# Patient Record
Sex: MALE | Race: WHITE | NOT HISPANIC OR LATINO | Employment: FULL TIME | URBAN - METROPOLITAN AREA
[De-identification: names, ages, dates, MRNs, and addresses within clinical notes are randomized per-mention and may not be internally consistent; named-entity substitution may affect disease eponyms.]

---

## 2019-05-28 ENCOUNTER — OFFICE VISIT (OUTPATIENT)
Dept: FAMILY MEDICINE | Facility: CLINIC | Age: 34
End: 2019-05-28
Payer: COMMERCIAL

## 2019-05-28 DIAGNOSIS — R73.9 HYPERGLYCEMIA: ICD-10-CM

## 2019-05-28 DIAGNOSIS — L97.919 VENOUS ULCERS OF BOTH LOWER EXTREMITIES: Primary | ICD-10-CM

## 2019-05-28 DIAGNOSIS — I83.029 VENOUS ULCERS OF BOTH LOWER EXTREMITIES: Primary | ICD-10-CM

## 2019-05-28 DIAGNOSIS — L97.929 VENOUS ULCERS OF BOTH LOWER EXTREMITIES: Primary | ICD-10-CM

## 2019-05-28 DIAGNOSIS — E66.01 MORBID OBESITY WITH BMI OF 70 AND OVER, ADULT: ICD-10-CM

## 2019-05-28 DIAGNOSIS — F17.210 TOBACCO DEPENDENCE DUE TO CIGARETTES: ICD-10-CM

## 2019-05-28 DIAGNOSIS — I83.019 VENOUS ULCERS OF BOTH LOWER EXTREMITIES: Primary | ICD-10-CM

## 2019-05-28 PROBLEM — Z00.00 ROUTINE MEDICAL EXAM: Status: ACTIVE | Noted: 2019-05-28

## 2019-05-28 PROCEDURE — 99999 PR PBB SHADOW E&M-NEW PATIENT-LVL V: ICD-10-PCS | Mod: PBBFAC,,, | Performed by: FAMILY MEDICINE

## 2019-05-28 PROCEDURE — 3008F BODY MASS INDEX DOCD: CPT | Mod: CPTII,S$GLB,, | Performed by: FAMILY MEDICINE

## 2019-05-28 PROCEDURE — 99203 PR OFFICE/OUTPT VISIT, NEW, LEVL III, 30-44 MIN: ICD-10-PCS | Mod: S$GLB,,, | Performed by: FAMILY MEDICINE

## 2019-05-28 PROCEDURE — 99203 OFFICE O/P NEW LOW 30 MIN: CPT | Mod: S$GLB,,, | Performed by: FAMILY MEDICINE

## 2019-05-28 PROCEDURE — 3008F PR BODY MASS INDEX (BMI) DOCUMENTED: ICD-10-PCS | Mod: CPTII,S$GLB,, | Performed by: FAMILY MEDICINE

## 2019-05-28 PROCEDURE — 99999 PR PBB SHADOW E&M-NEW PATIENT-LVL V: CPT | Mod: PBBFAC,,, | Performed by: FAMILY MEDICINE

## 2019-05-28 RX ORDER — ASPIRIN 81 MG/1
81 TABLET ORAL DAILY
COMMUNITY

## 2019-05-28 RX ORDER — DOXYCYCLINE 100 MG/1
CAPSULE ORAL
Refills: 0 | COMMUNITY
Start: 2019-05-26 | End: 2019-07-29 | Stop reason: ALTCHOICE

## 2019-05-28 NOTE — PROGRESS NOTES
"FAMILY MEDICINE    Patient Active Problem List   Diagnosis    Tobacco dependence due to cigarettes    Hyperglycemia    Morbid obesity with BMI of 70 and over, adult    Venous ulcers of both lower extremities       CC:   Chief Complaint   Patient presents with    Est PCP    Wound Care     both legs       HPI: Matthieu Son is a 34 y.o. male  - with morbid obesity presents to establish care following recent ER visit to [facility] for bilateral open leg wounds. Last PCP: none. He presents with his wife.     1. Bilateral leg wounds  Onset: about 1 week ago  - reports that started as red bumps on his legs. Had a similar issues several months ago and was evaluated at [facility] NP Radha Montano and treated with antibiotics with resolution and no opening of the wounds however this time s/p red bumps started ("they looked like spider bites"), they eventually opened about 1 week later and have not healed  - he went to [facility] ER 5/26/19 with WBC 10,300 and was treated with oral doxycycline and referred to the Wound care clinic  Location: bilateral lower legs  Duration: about 1-2 weeks open  Character: open non-painful wounds with drainage  - reports sister-in-law is a wound care tech and has been assisting with care of the wounds, cleaning with wound cleanser and applying Aquacel and foam dressing every other day  Aggravating factors: +varicose veins, obesity, stands for long hours at work  Relieving factors: none  Associated symptoms: drainage  Negative symptoms: denies pain, swelling, weakness        HEALTH MAINTENANCE:   Health Maintenance   Topic Date Due    Lipid Panel  1986    Pneumococcal Vaccine (Medium Risk) (1 of 1 - PPSV23) 02/09/2005    Influenza Vaccine  08/01/2019    TETANUS VACCINE  08/01/2026       ROS: Review of Systems   Constitutional: Negative for activity change, appetite change, fatigue, fever and unexpected weight change.   HENT: Negative.  Negative for tinnitus, trouble swallowing and voice change.  "   Eyes: Negative.  Negative for visual disturbance.   Respiratory: Negative for cough, chest tightness and shortness of breath.    Cardiovascular: Negative for chest pain, palpitations and leg swelling.   Gastrointestinal: Negative for abdominal distention, abdominal pain, anal bleeding, blood in stool, constipation, diarrhea, nausea and vomiting.   Endocrine: Negative for polydipsia, polyphagia and polyuria.   Genitourinary: Negative for difficulty urinating, frequency, hematuria and urgency.   Musculoskeletal: Negative for arthralgias, gait problem, joint swelling, neck pain and neck stiffness.   Skin: Positive for wound. Negative for rash.   Allergic/Immunologic: Negative.    Neurological: Negative for dizziness, syncope, weakness, light-headedness and headaches.   Hematological: Negative.    Psychiatric/Behavioral: Negative for dysphoric mood and sleep disturbance. The patient is not nervous/anxious.        ALLERGIES:   Review of patient's allergies indicates:  No Known Allergies    MEDS:     Current Outpatient Medications:     aspirin (ECOTRIN) 81 MG EC tablet, Take 81 mg by mouth once daily., Disp: , Rfl:     doxycycline (VIBRAMYCIN) 100 MG Cap, TK 1 C PO Q 12 H FOR 5 DAYS, Disp: , Rfl: 0    multivit-minerals/folic acid (ONE-A-DAY MEN VITACRAVES ORAL), Take 1 tablet by mouth once daily., Disp: , Rfl:     History reviewed. No pertinent past medical history.    Past Surgical History:   Procedure Laterality Date    WISDOM TOOTH EXTRACTION         Family History   Problem Relation Age of Onset    Diabetes Mother     Hypertension Mother     Breast cancer Mother     Hyperlipidemia Mother     Cancer Mother         breast cancer    Diabetes Father     Hypertension Father     Hyperlipidemia Father     Peripheral vascular disease Father     Heart attack Father     No Known Problems Sister     No Known Problems Son     Colon cancer Maternal Grandmother     Heart disease Maternal Grandfather     No  "Known Problems Paternal Grandmother     No Known Problems Paternal Grandfather        Social History     Social History Narrative     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 Smoker since 17 yo about 1 ppd. Denies illicit drgus       OBJECTIVE:   Vitals:    05/28/19 1414 05/28/19 1430   BP: (!) 144/102 136/84   BP Location: Left arm    Patient Position: Sitting    BP Method: Large (Manual)    Pulse: 93    Resp: 18    SpO2: 97%    Weight: (!) 229.5 kg (506 lb)    Height: 5' 11" (1.803 m)      Body mass index is 70.57 kg/m².    Physical Exam   Constitutional: He is oriented to person, place, and time. No distress.   Morbidly obese   HENT:   Head: Normocephalic and atraumatic.   Right Ear: Tympanic membrane and ear canal normal.   Left Ear: Tympanic membrane and ear canal normal.   Nose: Nose normal.   Mouth/Throat: Uvula is midline, oropharynx is clear and moist and mucous membranes are normal.   Eyes: Pupils are equal, round, and reactive to light. Conjunctivae and EOM are normal.   Neck: Trachea normal. Neck supple. Normal carotid pulses and no JVD present. Carotid bruit is not present. No thyromegaly present.   Cardiovascular: Normal rate, regular rhythm, normal heart sounds and intact distal pulses. Exam reveals no gallop and no friction rub.   No murmur heard.  Pulmonary/Chest: Effort normal and breath sounds normal. He has no decreased breath sounds. He has no wheezes. He has no rhonchi. He has no rales.   Abdominal: Soft. Normal appearance and bowel sounds are normal. There is no tenderness.   Musculoskeletal: He exhibits no edema.        Legs:  Amaury's sign: negative   Neurological: He is alert and oriented to person, place, and time.   Skin: Skin is warm. Capillary refill takes less than 2 seconds. No rash noted. No cyanosis. Nails show no clubbing.   Varicose changes bilateral lower extremities     Left leg    Right calf        Depression Patient Health " Questionnaire 5/28/2019   In the last two weeks how often have you had little interest or pleasure in doing things 0   In the last two weeks how often have you felt down, depressed or hopeless 0   PHQ-2 Total Score 0       PERTINENT RESULTS:   5/26/19   Sodium Level [135-145 mEq/L] 138 mEq/L   (5/26/19 12:08 PM) Potassium Level [3.5-5.0 mEq/L] 4.2 mEq/L   (5/26/19 12:08 PM) Chloride Level [ mEq/L] 102 mEq/L   (5/26/19 12:08 PM) Bicarbonate, CO2 [21-31 mEq/L] 26 mEq/L   (5/26/19 12:08 PM) Glucose, Plasma [ mg/dL] 218 mg/dL   *HI*  (5/26/19 12:08 PM) Calcium Level [8.5-10.5 mg/dL] 9.0 mg/dL   (5/26/19 12:08 PM) Blood Urea Nitrogen [7-21 mg/dL] 9 mg/dL   (5/26/19 12:08 PM) Creatinine [0.7-1.2 mg/dL] 0.6 mg/dL   *LOW*  (5/26/19 12:08 PM) Bun:Creatinine Ratio [6-22 Ratio] 15 Ratio   (5/26/19 12:08 PM) Calculated Glomerular Filtration Rate [>=60.0 mL/min/1.73m2] 155.2 mL/min/1.73m2   (5/26/19 12:08 PM) Anion Gap [9-18 mEq/L] 14 mEq/L   (5/26/19 12:08 PM) Calc Osmolality [275-295 mOsm/kg] 281 mOsm/kg   (5/26/19 12:08 PM)   HEMATOLOGY    Most recent to oldest [Reference Range]: 1   WBC [4.5-11.0 K/UL] 10.3 K/UL 1  (5/26/19 12:08 PM)    RBC [4.45-5.90 MIL/uL] 4.99 MIL/uL 2  (5/26/19 12:08 PM)    Hgb [13.6-17.5 gram/dL] 14.3 gram/dL 3  (5/26/19 12:08 PM)    Hct [40.0-52.0 %] 42.3 % 4  (5/26/19 12:08 PM)    MCV [80.0-94.0 Femtoliter] 84.8 Femtoliter 5  (5/26/19 12:08 PM)    MCH [27.0-33.0 Picogram] 28.7 Picogram 6  (5/26/19 12:08 PM)    MCHC [32.0-36.0 gram/dL] 33.8 gram/dL 7  (5/26/19 12:08 PM)    RDW [12.0-15.3 gram/dL] 13.9 gram/dL 8  (5/26/19 12:08 PM)    Platelet Count, Automated [150-350 K/UL] 204 K/UL 9  (5/26/19 12:08 PM)    Mean Platelet Volume [7.0-10.2 Femtoliter] 8.8 Femtoliter 10  (5/26/19 12:08 PM)    Diff Type Auto   *NA*  (5/26/19 12:08 PM)    Neutrophil % [32.0-80.0 %] 69.8 %   (5/26/19 12:08 PM)    Lymphocyte % [15.0-45.0 %] 19.4 %   (5/26/19 12:08 PM)    Monocyte % [3.0-13.0 %] 7.9 %   (5/26/19  12:08 PM)    Eosinophil % [0.0-4.0 %] 1.9 %   (5/26/19 12:08 PM)    Basophil % [0.0-2.0 %] 1.0 %   (5/26/19 12:08 PM)    Abs Neutrophil # [2.1-7.6 K/UL] 7.2 K/UL   (5/26/19 12:08 PM)    Abs Lymphocyte # [1.0-4.2 K/UL] 2.0 K/UL   (5/26/19 12:08 PM)    Abs Monocyte # [0.1-0.8 K/UL] 0.8 K/UL   (5/26/19 12:08 PM)    Abs Eosinophil # [0.0-0.7 K/UL] 0.2 K/UL   (5/26/19 12:08 PM)    Abs Basophil # [0.0-0.2 K/UL] 0.1 K/UL   (5/26/19 12:08 PM)          ASSESSMENT:  Problem List Items Addressed This Visit        Cardiac/Vascular    Venous ulcers of both lower extremities - Primary    Current Assessment & Plan     - appears to be venous stasis ulcers and will defer venous insufficiency scan to Wound Care  - referral to Wound care  - dressed with aquacel today         Relevant Orders    Ambulatory referral to Wound Clinic    Ambulatory referral to Wound Clinic       Endocrine    Hyperglycemia    Current Assessment & Plan     - glucose 218 at [facility] with family history of DM2  - counseling on diabetes and management  - rec A1C and repeat glucose         Relevant Orders    Comprehensive metabolic panel    Hemoglobin A1c    Morbid obesity with BMI of 70 and over, adult    Current Assessment & Plan     - counseling on weight loss  - dietary recommendation  - counseling on interventional/surgical weight loss options          Relevant Orders    Lipid panel       Other    Tobacco dependence due to cigarettes    Current Assessment & Plan     - recommend quit smoking  - readiness 6/10 and would try nicotine supplementation  - dicussed smoking cessation program pt declined at this time               PLAN:   Orders Placed This Encounter    Lipid panel    Comprehensive metabolic panel    Hemoglobin A1c    Ambulatory referral to Wound Clinic    Ambulatory referral to Wound Clinic     Follow-up  in 4 weeks.     Dr. Ebony Collins D.O.   Family Medicine

## 2019-05-29 ENCOUNTER — TELEPHONE (OUTPATIENT)
Dept: WOUND CARE | Facility: CLINIC | Age: 34
End: 2019-05-29

## 2019-05-29 VITALS
WEIGHT: 315 LBS | HEIGHT: 71 IN | HEART RATE: 93 BPM | BODY MASS INDEX: 44.1 KG/M2 | DIASTOLIC BLOOD PRESSURE: 84 MMHG | OXYGEN SATURATION: 97 % | RESPIRATION RATE: 18 BRPM | SYSTOLIC BLOOD PRESSURE: 136 MMHG

## 2019-05-29 PROBLEM — E66.01 MORBID OBESITY WITH BMI OF 70 AND OVER, ADULT: Status: ACTIVE | Noted: 2019-05-29

## 2019-05-29 PROBLEM — I83.029 VENOUS ULCERS OF BOTH LOWER EXTREMITIES: Status: ACTIVE | Noted: 2019-05-29

## 2019-05-29 PROBLEM — R73.9 HYPERGLYCEMIA: Status: ACTIVE | Noted: 2019-05-29

## 2019-05-29 PROBLEM — L97.919 VENOUS ULCERS OF BOTH LOWER EXTREMITIES: Status: ACTIVE | Noted: 2019-05-29

## 2019-05-29 PROBLEM — L97.929 VENOUS ULCERS OF BOTH LOWER EXTREMITIES: Status: ACTIVE | Noted: 2019-05-29

## 2019-05-29 PROBLEM — I83.019 VENOUS ULCERS OF BOTH LOWER EXTREMITIES: Status: ACTIVE | Noted: 2019-05-29

## 2019-05-29 NOTE — ASSESSMENT & PLAN NOTE
- glucose 218 at [facility] with family history of DM2  - counseling on diabetes and management  - rec A1C and repeat glucose

## 2019-05-29 NOTE — ASSESSMENT & PLAN NOTE
- appears to be venous stasis ulcers and will defer venous insufficiency scan to Wound Care  - referral to Wound care  - dressed with aquacel today

## 2019-05-29 NOTE — TELEPHONE ENCOUNTER
----- Message from Maribel Morrison RN sent at 5/29/2019  9:55 AM CDT -----  Help--workque referral for DELB[facility]  New appt showing up for Fri 5/31 at 1 but it won't let me book.  I have not called him since it was being difficult.  Thanks

## 2019-05-29 NOTE — ASSESSMENT & PLAN NOTE
- counseling on weight loss  - dietary recommendation  - counseling on interventional/surgical weight loss options

## 2019-05-29 NOTE — ASSESSMENT & PLAN NOTE
- recommend quit smoking  - readiness 6/10 and would try nicotine supplementation  - dicussed smoking cessation program pt declined at this time

## 2019-06-14 ENCOUNTER — TELEPHONE (OUTPATIENT)
Dept: FAMILY MEDICINE | Facility: CLINIC | Age: 34
End: 2019-06-14

## 2019-06-14 DIAGNOSIS — E11.9 TYPE 2 DIABETES MELLITUS WITHOUT COMPLICATION, UNSPECIFIED WHETHER LONG TERM INSULIN USE: ICD-10-CM

## 2019-06-14 DIAGNOSIS — E11.9 TYPE 2 DIABETES MELLITUS WITHOUT COMPLICATION: ICD-10-CM

## 2019-06-14 LAB
ALBUMIN SERPL-MCNC: 4 G/DL (ref 3.6–5.1)
ALBUMIN/GLOB SERPL: 1.5 (CALC) (ref 1–2.5)
ALP SERPL-CCNC: 76 U/L (ref 40–115)
ALT SERPL-CCNC: 40 U/L (ref 9–46)
AST SERPL-CCNC: 19 U/L (ref 10–40)
BILIRUB SERPL-MCNC: 0.5 MG/DL (ref 0.2–1.2)
BUN SERPL-MCNC: 13 MG/DL (ref 7–25)
BUN/CREAT SERPL: ABNORMAL (CALC) (ref 6–22)
CALCIUM SERPL-MCNC: 8.9 MG/DL (ref 8.6–10.3)
CHLORIDE SERPL-SCNC: 102 MMOL/L (ref 98–110)
CHOLEST SERPL-MCNC: 160 MG/DL
CHOLEST/HDLC SERPL: 4 (CALC)
CO2 SERPL-SCNC: 30 MMOL/L (ref 20–32)
CREAT SERPL-MCNC: 0.75 MG/DL (ref 0.6–1.35)
GFRSERPLBLD MDRD-ARVRAT: 121 ML/MIN/1.73M2
GLOBULIN SER CALC-MCNC: 2.6 G/DL (CALC) (ref 1.9–3.7)
GLUCOSE SERPL-MCNC: 139 MG/DL (ref 65–99)
HBA1C MFR BLD: 7.3 % OF TOTAL HGB
HDLC SERPL-MCNC: 40 MG/DL
LDLC SERPL CALC-MCNC: 97 MG/DL (CALC)
NONHDLC SERPL-MCNC: 120 MG/DL (CALC)
POTASSIUM SERPL-SCNC: 4.7 MMOL/L (ref 3.5–5.3)
PROT SERPL-MCNC: 6.6 G/DL (ref 6.1–8.1)
SODIUM SERPL-SCNC: 137 MMOL/L (ref 135–146)
TRIGL SERPL-MCNC: 132 MG/DL

## 2019-06-14 NOTE — TELEPHONE ENCOUNTER
----- Message from Jasmine Brown sent at 6/14/2019 10:55 AM CDT -----  No. [phone]   Patient returned your call.

## 2019-06-20 ENCOUNTER — PATIENT MESSAGE (OUTPATIENT)
Dept: FAMILY MEDICINE | Facility: CLINIC | Age: 34
End: 2019-06-20

## 2019-06-25 PROBLEM — E11.9 TYPE 2 DIABETES MELLITUS WITHOUT COMPLICATION, WITHOUT LONG-TERM CURRENT USE OF INSULIN: Status: ACTIVE | Noted: 2019-05-29

## 2019-06-25 PROBLEM — E78.2 MIXED HYPERLIPIDEMIA: Status: ACTIVE | Noted: 2019-06-25

## 2019-06-25 RX ORDER — ATORVASTATIN CALCIUM 20 MG/1
20 TABLET, FILM COATED ORAL DAILY
Qty: 90 TABLET | Refills: 3 | Status: CANCELLED | OUTPATIENT
Start: 2019-06-25 | End: 2020-06-24

## 2019-06-25 NOTE — PROGRESS NOTES
FAMILY MEDICINE    Patient Active Problem List   Diagnosis    Tobacco dependence due to cigarettes    Type 2 diabetes mellitus without complication, without long-term current use of insulin    Morbid obesity with BMI of 70 and over, adult    Venous ulcers of both lower extremities    Other sleep apnea    Essential hypertension    Venous stasis ulcer of calf limited to breakdown of skin with varicose veins       CC:   Chief Complaint   Patient presents with    Follow-up       SUBJECTIVE:  Matthieu Son   is a 34 y.o. male  - with morbid obesity, venous insufficiency with lower extremity venous stasis ulcer (followed by [facility] and healing well) and recently diagnosed type 2 diabetes  presents to follow-up labs.     1. Diabetes Type 2  Age diagnosed: 35 yo  Current treatment regimen: none  Side effects from treatment: NA  Complications of diabetes: NA    Glucometer: none  Glucose monitoring: NA    Last A1C:   Lab Results       Component                Value               Date                       HGBA1C                   7.3 (H)             06/13/2019              Last eye exam: due (ordered and pt will schedule with Dr. Mosley)  Last foot exam: due today and done today 6/25/19    Vaccines:   Influenza: due each fall  Pneumovax: 23 due (ordered today)  Prevnar 13: denies    2. Elevated BP  - noted last visit and in Urgent Care and wife reports that persistent since last visit   BP Readings from Last 5 Encounters:  06/28/19 : (!) 148/96  05/28/19 : 136/84    3. Concerns for Sleep Apnea  - wife reports +apnea with gasping and snoring  - poor sleep and fatigued during the day  Henning = 13      ROS: Review of Systems   Constitutional: Negative for activity change, appetite change, fatigue and unexpected weight change.   HENT: Negative.    Eyes: Negative for visual disturbance.   Respiratory: Positive for apnea. Negative for cough, chest tightness and shortness of breath.    Cardiovascular: Negative  for chest pain, palpitations and leg swelling.   Gastrointestinal: Negative for abdominal pain, constipation, diarrhea, nausea and vomiting.   Endocrine: Negative for polydipsia, polyphagia and polyuria.   Genitourinary: Negative for difficulty urinating and urgency.   Musculoskeletal: Negative for myalgias.   Skin: Positive for wound. Negative for rash.   Allergic/Immunologic: Negative.    Neurological: Negative for dizziness, light-headedness and headaches.   Hematological: Negative.    Psychiatric/Behavioral: Negative for dysphoric mood and sleep disturbance. The patient is not nervous/anxious.        History reviewed. No pertinent past medical history.    Past Surgical History:   Procedure Laterality Date    WISDOM TOOTH EXTRACTION         Family History   Problem Relation Age of Onset    Diabetes Mother     Hypertension Mother     Breast cancer Mother     Hyperlipidemia Mother     Cancer Mother         breast cancer    Diabetes Father     Hypertension Father     Hyperlipidemia Father     Peripheral vascular disease Father     Heart attack Father     No Known Problems Sister     No Known Problems Son     Colon cancer Maternal Grandmother     Heart disease Maternal Grandfather     No Known Problems Paternal Grandmother     No Known Problems Paternal Grandfather        Social History     Tobacco Use    Smoking status: Current Every Day Smoker     Packs/day: 1.00     Types: Cigarettes     Start date: 2/9/2003    Smokeless tobacco: Never Used   Substance Use Topics    Alcohol use: Yes     Comment: social    Drug use: Never       Social History     Social History Narrative     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 Smoker since 17 yo about 1 ppd. Denies illicit drgus       ALLERGIES: Review of patient's allergies indicates:  No Known Allergies    MEDS:   Current Outpatient Medications:     aspirin (ECOTRIN) 81 MG EC tablet, Take 81 mg by mouth  "once daily., Disp: , Rfl:     multivit-minerals/folic acid (ONE-A-DAY MEN VITACRAVES ORAL), Take 1 tablet by mouth once daily., Disp: , Rfl:     blood sugar diagnostic Strp, 1 strip by Misc.(Non-Drug; Combo Route) route once daily., Disp: 100 each, Rfl: 3    blood-glucose meter kit, Use as instructed, Disp: 1 each, Rfl: 0    doxycycline (VIBRAMYCIN) 100 MG Cap, TK 1 C PO Q 12 H FOR 5 DAYS, Disp: , Rfl: 0    hydroCHLOROthiazide (HYDRODIURIL) 25 MG tablet, Take 1 tablet (25 mg total) by mouth once daily., Disp: 90 tablet, Rfl: 0    lancets Misc, 1 lancet by Misc.(Non-Drug; Combo Route) route once daily., Disp: 100 each, Rfl: 3    metFORMIN (GLUCOPHAGE-XR) 500 MG 24 hr tablet, Take 1 tablet (500 mg total) by mouth daily with breakfast., Disp: 90 tablet, Rfl: 0    OBJECTIVE:   Vitals:    06/28/19 0743 06/28/19 0804   BP: (!) 148/96 (!) 150/98   BP Location: Other (Comment)  Comment: left forearm/wrist    Patient Position: Sitting    BP Method: Medium (Manual)    Pulse: 82    Temp: 97.9 °F (36.6 °C)    TempSrc: Oral    SpO2: 98%    Weight: (!) 224.8 kg (495 lb 8 oz)    Height: 5' 11" (1.803 m)      Body mass index is 69.11 kg/m².    Physical Exam   Constitutional: No distress.   Neck: Neck supple.   Cardiovascular: Normal rate, regular rhythm, normal heart sounds and intact distal pulses. Exam reveals no gallop and no friction rub.   No murmur heard.  Pulses:       Dorsalis pedis pulses are 2+ on the right side, and 2+ on the left side.        Posterior tibial pulses are 2+ on the right side, and 2+ on the left side.   Pulmonary/Chest: Effort normal and breath sounds normal.   Musculoskeletal: He exhibits no edema.   Feet:   Right Foot:   Protective Sensation: 5 sites tested. 5 sites sensed.   Left Foot:   Protective Sensation: 5 sites tested. 5 sites sensed.   Neurological: He is alert.   Skin: Skin is warm.         PERTINENT RESULTS:   No visits with results within 1 Month(s) from this visit.   Latest known " visit with results is:   Office Visit on 05/28/2019   Component Date Value Ref Range Status    Cholesterol 06/13/2019 160  <200 mg/dL Final    HDL 06/13/2019 40* >40 mg/dL Final    Triglycerides 06/13/2019 132  <150 mg/dL Final    LDL Cholesterol 06/13/2019 97  mg/dL (calc) Final    Comment: Reference range: <100     Desirable range <100 mg/dL for primary prevention;    <70 mg/dL for patients with CHD or diabetic patients   with > or = 2 CHD risk factors.     LDL-C is now calculated using the Marilyn   calculation, which is a validated novel method providing   better accuracy than the Friedewald equation in the   estimation of LDL-C.   Erik ACOSTA et al. ARLENE. 2013;310(19): 6359-0664   (http://education.[employer]Haven/faq/ORD249)      Hdl/Cholesterol Ratio 06/13/2019 4.0  <5.0 (calc) Final    Non HDL Chol. (LDL+VLDL) 06/13/2019 120  <130 mg/dL (calc) Final    Comment: For patients with diabetes plus 1 major ASCVD risk   factor, treating to a non-HDL-C goal of <100 mg/dL   (LDL-C of <70 mg/dL) is considered a therapeutic   option.      Glucose 06/13/2019 139* 65 - 99 mg/dL Final    Comment:               Fasting reference interval     For someone without known diabetes, a glucose  value >125 mg/dL indicates that they may have  diabetes and this should be confirmed with a  follow-up test.         BUN, Bld 06/13/2019 13  7 - 25 mg/dL Final    Creatinine 06/13/2019 0.75  0.60 - 1.35 mg/dL Final    eGFR if non African American 06/13/2019 121  > OR = 60 mL/min/1.73m2 Final    eGFR if African American 06/13/2019 140  > OR = 60 mL/min/1.73m2 Final    BUN/Creatinine Ratio 06/13/2019 NOT APPLICABLE  6 - 22 (calc) Final    Sodium 06/13/2019 137  135 - 146 mmol/L Final    Potassium 06/13/2019 4.7  3.5 - 5.3 mmol/L Final    Chloride 06/13/2019 102  98 - 110 mmol/L Final    CO2 06/13/2019 30  20 - 32 mmol/L Final    Calcium 06/13/2019 8.9  8.6 - 10.3 mg/dL Final    Total Protein 06/13/2019 6.6  6.1 -  8.1 g/dL Final    Albumin 06/13/2019 4.0  3.6 - 5.1 g/dL Final    Globulin, Total 06/13/2019 2.6  1.9 - 3.7 g/dL (calc) Final    Albumin/Globulin Ratio 06/13/2019 1.5  1.0 - 2.5 (calc) Final    Total Bilirubin 06/13/2019 0.5  0.2 - 1.2 mg/dL Final    Alkaline Phosphatase 06/13/2019 76  40 - 115 U/L Final    AST 06/13/2019 19  10 - 40 U/L Final    ALT 06/13/2019 40  9 - 46 U/L Final    Hemoglobin A1C 06/13/2019 7.3* <5.7 % of total Hgb Final    Comment: For someone without known diabetes, a hemoglobin A1c  value of 6.5% or greater indicates that they may have   diabetes and this should be confirmed with a follow-up   test.     For someone with known diabetes, a value <7% indicates   that their diabetes is well controlled and a value   greater than or equal to 7% indicates suboptimal   control. A1c targets should be individualized based on   duration of diabetes, age, comorbid conditions, and   other considerations.     Currently, no consensus exists regarding use of  hemoglobin A1c for diagnosis of diabetes for children.             ASSESSMENT:  Problem List Items Addressed This Visit        Cardiac/Vascular    Essential hypertension    Current Assessment & Plan     - newly diagnsoed  - counseling on HTN with treatment with weight loss, diet, exercise and medication  - rec start HCTZ 25 mg daily  - counseling regarding new medications including expected results, potential side effects, and appropriate use. Questions elicited and answered         Relevant Medications    hydroCHLOROthiazide (HYDRODIURIL) 25 MG tablet       Endocrine    Type 2 diabetes mellitus without complication, without long-term current use of insulin - Primary    Current Assessment & Plan     - newly diagnosed   - education regarding DM2 and diet, weight loss, medication and monitoring  - recommend diabetes education but pt declined  - counseling on preventative care, vaccines, eye exam and foot exams with monitor labs  - start Metformin   mg daily  - counseling regarding new medications including expected results, potential side effects, and appropriate use. Questions elicited and answered  - does not need to monitor glucose daily but I still rec glucose meter  - rec PPSV 23 day         Relevant Medications    metFORMIN (GLUCOPHAGE-XR) 500 MG 24 hr tablet    blood sugar diagnostic Strp    blood-glucose meter kit    lancets Misc    Other Relevant Orders     DIABETES FOOT EXAM (Completed)    Ambulatory referral to Ophthalmology    Morbid obesity with BMI of 70 and over, adult    Current Assessment & Plan     - 11 lbs weight loss since last visit with dietary changes  - good job            Other    Other sleep apnea    Current Assessment & Plan     - concern for CRISTO  - [place] 13 with HTN, morbid obesity and witnessed apnea  - home sleep study ordered         Relevant Orders    Home Sleep Studies          PLAN:   Orders Placed This Encounter    Home Sleep Studies    Ambulatory referral to Ophthalmology     DIABETES FOOT EXAM    metFORMIN (GLUCOPHAGE-XR) 500 MG 24 hr tablet    blood sugar diagnostic Strp    blood-glucose meter kit    lancets Misc    hydroCHLOROthiazide (HYDRODIURIL) 25 MG tablet     Follow-up in 1 month BP check.     Dr. Ebony Collins D.O.   Family Medicine

## 2019-06-28 ENCOUNTER — OFFICE VISIT (OUTPATIENT)
Dept: FAMILY MEDICINE | Facility: CLINIC | Age: 34
End: 2019-06-28
Payer: COMMERCIAL

## 2019-06-28 VITALS
HEART RATE: 82 BPM | TEMPERATURE: 98 F | BODY MASS INDEX: 44.1 KG/M2 | HEIGHT: 71 IN | SYSTOLIC BLOOD PRESSURE: 150 MMHG | OXYGEN SATURATION: 98 % | DIASTOLIC BLOOD PRESSURE: 98 MMHG | WEIGHT: 315 LBS

## 2019-06-28 DIAGNOSIS — I10 ESSENTIAL HYPERTENSION: ICD-10-CM

## 2019-06-28 DIAGNOSIS — E11.9 TYPE 2 DIABETES MELLITUS WITHOUT COMPLICATION, WITHOUT LONG-TERM CURRENT USE OF INSULIN: Primary | ICD-10-CM

## 2019-06-28 DIAGNOSIS — G47.39 OTHER SLEEP APNEA: ICD-10-CM

## 2019-06-28 DIAGNOSIS — E66.01 MORBID OBESITY WITH BMI OF 70 AND OVER, ADULT: ICD-10-CM

## 2019-06-28 PROBLEM — G47.33 OBSTRUCTIVE SLEEP APNEA SYNDROME: Status: ACTIVE | Noted: 2019-06-28

## 2019-06-28 PROBLEM — I83.002 VENOUS STASIS ULCER OF CALF LIMITED TO BREAKDOWN OF SKIN WITH VARICOSE VEINS: Status: ACTIVE | Noted: 2019-06-28

## 2019-06-28 PROBLEM — L97.201 VENOUS STASIS ULCER OF CALF LIMITED TO BREAKDOWN OF SKIN WITH VARICOSE VEINS: Status: ACTIVE | Noted: 2019-06-28

## 2019-06-28 PROCEDURE — 3008F BODY MASS INDEX DOCD: CPT | Mod: CPTII,S$GLB,, | Performed by: FAMILY MEDICINE

## 2019-06-28 PROCEDURE — 3077F PR MOST RECENT SYSTOLIC BLOOD PRESSURE >= 140 MM HG: ICD-10-PCS | Mod: CPTII,S$GLB,, | Performed by: FAMILY MEDICINE

## 2019-06-28 PROCEDURE — 3045F PR MOST RECENT HEMOGLOBIN A1C LEVEL 7.0-9.0%: ICD-10-PCS | Mod: CPTII,S$GLB,, | Performed by: FAMILY MEDICINE

## 2019-06-28 PROCEDURE — 3080F DIAST BP >= 90 MM HG: CPT | Mod: CPTII,S$GLB,, | Performed by: FAMILY MEDICINE

## 2019-06-28 PROCEDURE — 99999 PR PBB SHADOW E&M-EST. PATIENT-LVL IV: ICD-10-PCS | Mod: PBBFAC,,, | Performed by: FAMILY MEDICINE

## 2019-06-28 PROCEDURE — 3077F SYST BP >= 140 MM HG: CPT | Mod: CPTII,S$GLB,, | Performed by: FAMILY MEDICINE

## 2019-06-28 PROCEDURE — 99214 OFFICE O/P EST MOD 30 MIN: CPT | Mod: S$GLB,,, | Performed by: FAMILY MEDICINE

## 2019-06-28 PROCEDURE — 3080F PR MOST RECENT DIASTOLIC BLOOD PRESSURE >= 90 MM HG: ICD-10-PCS | Mod: CPTII,S$GLB,, | Performed by: FAMILY MEDICINE

## 2019-06-28 PROCEDURE — 99999 PR PBB SHADOW E&M-EST. PATIENT-LVL IV: CPT | Mod: PBBFAC,,, | Performed by: FAMILY MEDICINE

## 2019-06-28 PROCEDURE — 99214 PR OFFICE/OUTPT VISIT, EST, LEVL IV, 30-39 MIN: ICD-10-PCS | Mod: S$GLB,,, | Performed by: FAMILY MEDICINE

## 2019-06-28 PROCEDURE — 3045F PR MOST RECENT HEMOGLOBIN A1C LEVEL 7.0-9.0%: CPT | Mod: CPTII,S$GLB,, | Performed by: FAMILY MEDICINE

## 2019-06-28 PROCEDURE — 3008F PR BODY MASS INDEX (BMI) DOCUMENTED: ICD-10-PCS | Mod: CPTII,S$GLB,, | Performed by: FAMILY MEDICINE

## 2019-06-28 RX ORDER — HYDROCHLOROTHIAZIDE 25 MG/1
25 TABLET ORAL DAILY
Qty: 90 TABLET | Refills: 0 | Status: SHIPPED | OUTPATIENT
Start: 2019-06-28 | End: 2019-09-23 | Stop reason: SDUPTHER

## 2019-06-28 RX ORDER — LANCETS
1 EACH MISCELLANEOUS DAILY
Qty: 100 EACH | Refills: 3 | Status: SHIPPED | OUTPATIENT
Start: 2019-06-28 | End: 2023-11-10

## 2019-06-28 RX ORDER — METFORMIN HYDROCHLORIDE 500 MG/1
500 TABLET, EXTENDED RELEASE ORAL
Qty: 90 TABLET | Refills: 0 | Status: SHIPPED | OUTPATIENT
Start: 2019-06-28 | End: 2019-09-23 | Stop reason: SDUPTHER

## 2019-06-28 RX ORDER — INSULIN PUMP SYRINGE, 3 ML
EACH MISCELLANEOUS
Qty: 1 EACH | Refills: 0 | Status: SHIPPED | OUTPATIENT
Start: 2019-06-28 | End: 2023-11-10

## 2019-06-28 NOTE — ASSESSMENT & PLAN NOTE
- newly diagnosed   - education regarding DM2 and diet, weight loss, medication and monitoring  - recommend diabetes education but pt declined  - counseling on preventative care, vaccines, eye exam and foot exams with monitor labs  - start Metformin  mg daily  - counseling regarding new medications including expected results, potential side effects, and appropriate use. Questions elicited and answered  - does not need to monitor glucose daily but I still rec glucose meter  - rec PPSV 23 day

## 2019-06-28 NOTE — ASSESSMENT & PLAN NOTE
- concern for CRISTO  - [place] 14 with HTN, morbid obesity and witnessed apnea  - home sleep study ordered

## 2019-06-28 NOTE — ASSESSMENT & PLAN NOTE
- newly diagnsoed  - counseling on HTN with treatment with weight loss, diet, exercise and medication  - rec start HCTZ 25 mg daily  - counseling regarding new medications including expected results, potential side effects, and appropriate use. Questions elicited and answered

## 2019-06-28 NOTE — PATIENT INSTRUCTIONS
1. Blood pressure goal <130/80 thought <120/70 ideal  - new blood pressure medication Hydrochlorothiazide 25 mg daily  2. Fasting glucose goal <120  - new medication Metformin  mg daily  3. Notify me of any issues or concerns

## 2019-07-01 ENCOUNTER — TELEPHONE (OUTPATIENT)
Dept: SLEEP MEDICINE | Facility: OTHER | Age: 34
End: 2019-07-01

## 2019-07-08 ENCOUNTER — TELEPHONE (OUTPATIENT)
Dept: SLEEP MEDICINE | Facility: OTHER | Age: 34
End: 2019-07-08

## 2019-07-15 ENCOUNTER — PATIENT OUTREACH (OUTPATIENT)
Dept: ADMINISTRATIVE | Facility: HOSPITAL | Age: 34
End: 2019-07-15

## 2019-07-15 ENCOUNTER — TELEPHONE (OUTPATIENT)
Dept: SLEEP MEDICINE | Facility: OTHER | Age: 34
End: 2019-07-15

## 2019-07-18 ENCOUNTER — TELEPHONE (OUTPATIENT)
Dept: SLEEP MEDICINE | Facility: OTHER | Age: 34
End: 2019-07-18

## 2019-07-18 NOTE — TELEPHONE ENCOUNTER
Left messages to schedule the home sleep study,no response.  Sent out a message through [facility] to schedule.

## 2019-07-23 LAB
LEFT EYE DM RETINOPATHY: NORMAL
RIGHT EYE DM RETINOPATHY: NORMAL

## 2019-07-25 PROBLEM — L97.201 VENOUS STASIS ULCER OF CALF LIMITED TO BREAKDOWN OF SKIN WITH VARICOSE VEINS: Status: RESOLVED | Noted: 2019-06-28 | Resolved: 2019-07-25

## 2019-07-25 PROBLEM — L97.929 VENOUS ULCERS OF BOTH LOWER EXTREMITIES: Status: RESOLVED | Noted: 2019-05-29 | Resolved: 2019-07-25

## 2019-07-25 PROBLEM — I83.029 VENOUS ULCERS OF BOTH LOWER EXTREMITIES: Status: RESOLVED | Noted: 2019-05-29 | Resolved: 2019-07-25

## 2019-07-25 PROBLEM — I83.002 VENOUS STASIS ULCER OF CALF LIMITED TO BREAKDOWN OF SKIN WITH VARICOSE VEINS: Status: RESOLVED | Noted: 2019-06-28 | Resolved: 2019-07-25

## 2019-07-25 PROBLEM — L97.919 VENOUS ULCERS OF BOTH LOWER EXTREMITIES: Status: RESOLVED | Noted: 2019-05-29 | Resolved: 2019-07-25

## 2019-07-25 PROBLEM — I83.019 VENOUS ULCERS OF BOTH LOWER EXTREMITIES: Status: RESOLVED | Noted: 2019-05-29 | Resolved: 2019-07-25

## 2019-07-25 NOTE — PROGRESS NOTES
FAMILY MEDICINE    Patient Active Problem List   Diagnosis    Tobacco dependence due to cigarettes    Type 2 diabetes mellitus without complication, without long-term current use of insulin    Morbid obesity with BMI of 70 and over, adult    Other sleep apnea    Essential hypertension       CC:   Chief Complaint   Patient presents with    Follow-up       SUBJECTIVE:  Matthieu Son   is a 34 y.o. male  - with morbid obesity, venous insufficiency with history lower extremity venous stasis ulcer that have healed, hypertension and recently diagnosed type 2 diabetes presents to follow-up.    1. Diabetes Type 2    Age diagnosed: 33 yo  Current treatment regimen:   metFORMIN (GLUCOPHAGE-XR) 500 MG 24 hr tablet, Take 1 tablet (500 mg total) by mouth daily with breakfast., Disp: 90 tablet, Rfl: 0    Side effects from treatment: mild stomach upset and diarrhea about 3 times since started medication and tolerable  Complications of diabetes: none    Glucometer: yes  Glucose monitoring: every once in a while reports fasting 126-132 mg/dL    Last A1C:   Lab Results       Component                Value               Date                       HGBA1C                   7.3 (H)             06/13/2019              Last eye exam: completed with Dr. Mosley 7/2019 (TAMARA signed and copy requested)   Last foot exam: 6/25/19    Vaccines:   Influenza: due each fall  Pneumovax: 23: recommended and done today 7/29/19  Prevnar 13: denies    2. Hypertension  Age diagnosed: 33 yo    Current medication treatment:   hydroCHLOROthiazide (HYDRODIURIL) 25 MG tablet, Take 1 tablet (25 mg total) by mouth once daily., Disp: 90 tablet, Rfl: 0    Medication side effects: denies  Exercise regimen: walking  Dietary treatment: low Na    Home BP cuff:  none  How often does patient monitoring BP? NA  Last home BP reading: NA     Last 14 day average of home BP reading: NA    Wt Readings from Last 5 Encounters:  07/29/19 : (!) 221.4 kg (488 lb)  06/28/19  : (!) 224.8 kg (495 lb 8 oz)  05/28/19 : (!) 229.5 kg (506 lb)    3. Concerns for Sleep Apnea  - wife reports +apnea with gasping and snoring  - poor sleep and fatigued during the day  [place] = 13  - sleep study ordered last visit 6/28/19 and pt was contacted but he has not scheduled appt yet      ROS: Review of Systems   Constitutional: Negative for activity change, appetite change, fatigue and unexpected weight change.   HENT: Negative.    Eyes: Negative for visual disturbance.   Respiratory: Positive for apnea. Negative for cough, chest tightness and shortness of breath.    Cardiovascular: Negative for chest pain, palpitations and leg swelling.   Gastrointestinal: Positive for diarrhea. Negative for abdominal pain, constipation, nausea and vomiting.   Endocrine: Negative for polydipsia, polyphagia and polyuria.   Genitourinary: Negative for difficulty urinating and urgency.   Musculoskeletal: Negative for myalgias.   Skin: Negative for rash and wound (healed).   Allergic/Immunologic: Negative.    Neurological: Negative for dizziness, light-headedness and headaches.   Hematological: Negative.    Psychiatric/Behavioral: Negative for dysphoric mood and sleep disturbance. The patient is not nervous/anxious.        Past Medical History:   Diagnosis Date    Venous stasis ulcer of calf limited to breakdown of skin with varicose veins 6/28/2019    Venous ulcers of both lower extremities 5/29/2019       Past Surgical History:   Procedure Laterality Date    WISDOM TOOTH EXTRACTION         Family History   Problem Relation Age of Onset    Diabetes Mother     Hypertension Mother     Breast cancer Mother     Hyperlipidemia Mother     Cancer Mother         breast cancer    Diabetes Father     Hypertension Father     Hyperlipidemia Father     Peripheral vascular disease Father     Heart attack Father     No Known Problems Sister     No Known Problems Son     Colon cancer Maternal Grandmother     Heart disease  "Maternal Grandfather     No Known Problems Paternal Grandmother     No Known Problems Paternal Grandfather        Social History     Tobacco Use    Smoking status: Current Every Day Smoker     Packs/day: 1.00     Types: Cigarettes     Start date: 2/9/2003    Smokeless tobacco: Never Used   Substance Use Topics    Alcohol use: Yes     Comment: social    Drug use: Never       Social History     Social History Narrative     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 Smoker since 19 yo about 1 ppd. Denies illicit drgus       ALLERGIES: Review of patient's allergies indicates:  No Known Allergies    MEDS:   Current Outpatient Medications:     aspirin (ECOTRIN) 81 MG EC tablet, Take 81 mg by mouth once daily., Disp: , Rfl:     blood sugar diagnostic Strp, 1 strip by Misc.(Non-Drug; Combo Route) route once daily., Disp: 100 each, Rfl: 3    blood-glucose meter kit, Use as instructed, Disp: 1 each, Rfl: 0    hydroCHLOROthiazide (HYDRODIURIL) 25 MG tablet, Take 1 tablet (25 mg total) by mouth once daily., Disp: 90 tablet, Rfl: 0    lancets Misc, 1 lancet by Misc.(Non-Drug; Combo Route) route once daily., Disp: 100 each, Rfl: 3    metFORMIN (GLUCOPHAGE-XR) 500 MG 24 hr tablet, Take 1 tablet (500 mg total) by mouth daily with breakfast., Disp: 90 tablet, Rfl: 0    multivit-minerals/folic acid (ONE-A-DAY MEN VITACRAVES ORAL), Take 1 tablet by mouth once daily., Disp: , Rfl:     TRUE METRIX GLUCOSE METER Misc, USE UTD, Disp: , Rfl: 0    TRUEPLUS LANCETS 33 gauge Misc, CHECK BLOOD SUGAR QD, Disp: , Rfl: 3    OBJECTIVE:   Vitals:    07/29/19 0744 07/29/19 0815   BP: (!) 138/90 136/88   BP Location: Left arm    Patient Position: Sitting    BP Method: Large (Manual)  Comment: taken on wrist    Pulse: 78    Temp: 97.5 °F (36.4 °C)    TempSrc: Oral    SpO2: 99%    Weight: (!) 221.4 kg (488 lb)    Height: 5' 11" (1.803 m)      Body mass index is 68.06 kg/m².    Physical " Exam   Constitutional: No distress.   Cardiovascular: Normal rate, regular rhythm, normal heart sounds and intact distal pulses. Exam reveals no gallop and no friction rub.   No murmur heard.  Pulmonary/Chest: Effort normal and breath sounds normal. He has no decreased breath sounds. He has no wheezes. He has no rhonchi. He has no rales.   Musculoskeletal: He exhibits no edema.   Neurological: He is alert.   Skin: Skin is warm.         PERTINENT RESULTS:   No visits with results within 1 Month(s) from this visit.   Latest known visit with results is:   Office Visit on 05/28/2019   Component Date Value Ref Range Status    Cholesterol 06/13/2019 160  <200 mg/dL Final    HDL 06/13/2019 40* >40 mg/dL Final    Triglycerides 06/13/2019 132  <150 mg/dL Final    LDL Cholesterol 06/13/2019 97  mg/dL (calc) Final    Comment: Reference range: <100     Desirable range <100 mg/dL for primary prevention;    <70 mg/dL for patients with CHD or diabetic patients   with > or = 2 CHD risk factors.     LDL-C is now calculated using the Erik-Jorge   calculation, which is a validated novel method providing   better accuracy than the Friedewald equation in the   estimation of LDL-C.   Erik ACOSTA et al. ARLENE. 2013;310(19): 2222-6492   (http://education.AutoShag/faq/FDU246)      Hdl/Cholesterol Ratio 06/13/2019 4.0  <5.0 (calc) Final    Non HDL Chol. (LDL+VLDL) 06/13/2019 120  <130 mg/dL (calc) Final    Comment: For patients with diabetes plus 1 major ASCVD risk   factor, treating to a non-HDL-C goal of <100 mg/dL   (LDL-C of <70 mg/dL) is considered a therapeutic   option.      Glucose 06/13/2019 139* 65 - 99 mg/dL Final    Comment:               Fasting reference interval     For someone without known diabetes, a glucose  value >125 mg/dL indicates that they may have  diabetes and this should be confirmed with a  follow-up test.         BUN, Bld 06/13/2019 13  7 - 25 mg/dL Final    Creatinine 06/13/2019 0.75  0.60  - 1.35 mg/dL Final    eGFR if non African American 06/13/2019 121  > OR = 60 mL/min/1.73m2 Final    eGFR if African American 06/13/2019 140  > OR = 60 mL/min/1.73m2 Final    BUN/Creatinine Ratio 06/13/2019 NOT APPLICABLE  6 - 22 (calc) Final    Sodium 06/13/2019 137  135 - 146 mmol/L Final    Potassium 06/13/2019 4.7  3.5 - 5.3 mmol/L Final    Chloride 06/13/2019 102  98 - 110 mmol/L Final    CO2 06/13/2019 30  20 - 32 mmol/L Final    Calcium 06/13/2019 8.9  8.6 - 10.3 mg/dL Final    Total Protein 06/13/2019 6.6  6.1 - 8.1 g/dL Final    Albumin 06/13/2019 4.0  3.6 - 5.1 g/dL Final    Globulin, Total 06/13/2019 2.6  1.9 - 3.7 g/dL (calc) Final    Albumin/Globulin Ratio 06/13/2019 1.5  1.0 - 2.5 (calc) Final    Total Bilirubin 06/13/2019 0.5  0.2 - 1.2 mg/dL Final    Alkaline Phosphatase 06/13/2019 76  40 - 115 U/L Final    AST 06/13/2019 19  10 - 40 U/L Final    ALT 06/13/2019 40  9 - 46 U/L Final    Hemoglobin A1C 06/13/2019 7.3* <5.7 % of total Hgb Final    Comment: For someone without known diabetes, a hemoglobin A1c  value of 6.5% or greater indicates that they may have   diabetes and this should be confirmed with a follow-up   test.     For someone with known diabetes, a value <7% indicates   that their diabetes is well controlled and a value   greater than or equal to 7% indicates suboptimal   control. A1c targets should be individualized based on   duration of diabetes, age, comorbid conditions, and   other considerations.     Currently, no consensus exists regarding use of  hemoglobin A1c for diagnosis of diabetes for children.             ASSESSMENT:  Problem List Items Addressed This Visit        Cardiac/Vascular    Essential hypertension - Primary    Current Assessment & Plan     - improved and tolerating HCTZ  - sleep study pending  - continuing with weight loss  - goal <130/85 and discussed may add Losartan next visit if still above goal            Endocrine    Type 2 diabetes mellitus  without complication, without long-term current use of insulin    Current Assessment & Plan     - tolerating medication with mild side effects  - discussed recommendation for diet, exercise and weight loss  - TAMARA signed of DM eye exam         Relevant Orders    Microalbumin/creatinine urine ratio    Hemoglobin A1c    Basic metabolic panel    Morbid obesity with BMI of 70 and over, adult    Current Assessment & Plan     - continues to lose weight with 18 lbs lost since dietary changes and exercise  - encourage to continue               PLAN:   Orders Placed This Encounter    Pneumococcal Polysaccharide Vaccine (23 Valent) (SQ/IM)    Microalbumin/creatinine urine ratio    Hemoglobin A1c    Basic metabolic panel     Follow-up in 3 months with labs 1 week prior    Dr. Ebony Collins D.O.   Family Medicine

## 2019-07-26 ENCOUNTER — TELEPHONE (OUTPATIENT)
Dept: SLEEP MEDICINE | Facility: OTHER | Age: 34
End: 2019-07-26

## 2019-07-26 NOTE — TELEPHONE ENCOUNTER
Left messages and sent out a message through [facility] to schedule his home sleep study.  No response.

## 2019-07-29 ENCOUNTER — OFFICE VISIT (OUTPATIENT)
Dept: FAMILY MEDICINE | Facility: CLINIC | Age: 34
End: 2019-07-29
Payer: COMMERCIAL

## 2019-07-29 ENCOUNTER — TELEPHONE (OUTPATIENT)
Dept: ADMINISTRATIVE | Facility: HOSPITAL | Age: 34
End: 2019-07-29

## 2019-07-29 VITALS
DIASTOLIC BLOOD PRESSURE: 88 MMHG | TEMPERATURE: 98 F | BODY MASS INDEX: 44.1 KG/M2 | SYSTOLIC BLOOD PRESSURE: 136 MMHG | HEIGHT: 71 IN | OXYGEN SATURATION: 99 % | HEART RATE: 78 BPM | WEIGHT: 315 LBS

## 2019-07-29 DIAGNOSIS — E66.01 MORBID OBESITY WITH BMI OF 70 AND OVER, ADULT: ICD-10-CM

## 2019-07-29 DIAGNOSIS — I10 ESSENTIAL HYPERTENSION: Primary | ICD-10-CM

## 2019-07-29 DIAGNOSIS — E11.9 TYPE 2 DIABETES MELLITUS WITHOUT COMPLICATION, WITHOUT LONG-TERM CURRENT USE OF INSULIN: ICD-10-CM

## 2019-07-29 PROCEDURE — 3008F BODY MASS INDEX DOCD: CPT | Mod: CPTII,S$GLB,, | Performed by: FAMILY MEDICINE

## 2019-07-29 PROCEDURE — 3045F PR MOST RECENT HEMOGLOBIN A1C LEVEL 7.0-9.0%: ICD-10-PCS | Mod: CPTII,S$GLB,, | Performed by: FAMILY MEDICINE

## 2019-07-29 PROCEDURE — 90732 PNEUMOCOCCAL POLYSACCHARIDE VACCINE 23-VALENT =>2YO SQ IM: ICD-10-PCS | Mod: S$GLB,,, | Performed by: FAMILY MEDICINE

## 2019-07-29 PROCEDURE — 99999 PR PBB SHADOW E&M-EST. PATIENT-LVL IV: ICD-10-PCS | Mod: PBBFAC,,, | Performed by: FAMILY MEDICINE

## 2019-07-29 PROCEDURE — 99214 OFFICE O/P EST MOD 30 MIN: CPT | Mod: 25,S$GLB,, | Performed by: FAMILY MEDICINE

## 2019-07-29 PROCEDURE — 99214 PR OFFICE/OUTPT VISIT, EST, LEVL IV, 30-39 MIN: ICD-10-PCS | Mod: 25,S$GLB,, | Performed by: FAMILY MEDICINE

## 2019-07-29 PROCEDURE — 3075F SYST BP GE 130 - 139MM HG: CPT | Mod: CPTII,S$GLB,, | Performed by: FAMILY MEDICINE

## 2019-07-29 PROCEDURE — 3079F PR MOST RECENT DIASTOLIC BLOOD PRESSURE 80-89 MM HG: ICD-10-PCS | Mod: CPTII,S$GLB,, | Performed by: FAMILY MEDICINE

## 2019-07-29 PROCEDURE — 3075F PR MOST RECENT SYSTOLIC BLOOD PRESS GE 130-139MM HG: ICD-10-PCS | Mod: CPTII,S$GLB,, | Performed by: FAMILY MEDICINE

## 2019-07-29 PROCEDURE — 90471 IMMUNIZATION ADMIN: CPT | Mod: S$GLB,,, | Performed by: FAMILY MEDICINE

## 2019-07-29 PROCEDURE — 3045F PR MOST RECENT HEMOGLOBIN A1C LEVEL 7.0-9.0%: CPT | Mod: CPTII,S$GLB,, | Performed by: FAMILY MEDICINE

## 2019-07-29 PROCEDURE — 3008F PR BODY MASS INDEX (BMI) DOCUMENTED: ICD-10-PCS | Mod: CPTII,S$GLB,, | Performed by: FAMILY MEDICINE

## 2019-07-29 PROCEDURE — 90471 PNEUMOCOCCAL POLYSACCHARIDE VACCINE 23-VALENT =>2YO SQ IM: ICD-10-PCS | Mod: S$GLB,,, | Performed by: FAMILY MEDICINE

## 2019-07-29 PROCEDURE — 99999 PR PBB SHADOW E&M-EST. PATIENT-LVL IV: CPT | Mod: PBBFAC,,, | Performed by: FAMILY MEDICINE

## 2019-07-29 PROCEDURE — 90732 PPSV23 VACC 2 YRS+ SUBQ/IM: CPT | Mod: S$GLB,,, | Performed by: FAMILY MEDICINE

## 2019-07-29 PROCEDURE — 3079F DIAST BP 80-89 MM HG: CPT | Mod: CPTII,S$GLB,, | Performed by: FAMILY MEDICINE

## 2019-07-29 RX ORDER — LANCETS 33 GAUGE
EACH MISCELLANEOUS
Refills: 3 | COMMUNITY
Start: 2019-06-28

## 2019-07-29 RX ORDER — BLOOD-GLUCOSE METER
EACH MISCELLANEOUS
Refills: 0 | COMMUNITY
Start: 2019-06-28

## 2019-07-29 NOTE — ASSESSMENT & PLAN NOTE
- tolerating medication with mild side effects  - discussed recommendation for diet, exercise and weight loss  - TAMARA signed of DM eye exam

## 2019-07-29 NOTE — ASSESSMENT & PLAN NOTE
- continues to lose weight with 18 lbs lost since dietary changes and exercise  - encourage to continue

## 2019-07-29 NOTE — ASSESSMENT & PLAN NOTE
- improved and tolerating HCTZ  - sleep study pending  - continuing with weight loss  - goal <130/85 and discussed may add Losartan next visit if still above goal

## 2019-09-23 DIAGNOSIS — I10 ESSENTIAL HYPERTENSION: ICD-10-CM

## 2019-09-23 DIAGNOSIS — E11.9 TYPE 2 DIABETES MELLITUS WITHOUT COMPLICATION, WITHOUT LONG-TERM CURRENT USE OF INSULIN: ICD-10-CM

## 2019-09-23 RX ORDER — HYDROCHLOROTHIAZIDE 25 MG/1
TABLET ORAL
Qty: 90 TABLET | Refills: 0 | Status: SHIPPED | OUTPATIENT
Start: 2019-09-23 | End: 2019-10-25 | Stop reason: SDUPTHER

## 2019-09-23 RX ORDER — METFORMIN HYDROCHLORIDE 500 MG/1
TABLET, EXTENDED RELEASE ORAL
Qty: 90 TABLET | Refills: 0 | Status: SHIPPED | OUTPATIENT
Start: 2019-09-23 | End: 2019-10-25 | Stop reason: SDUPTHER

## 2019-10-06 ENCOUNTER — PATIENT MESSAGE (OUTPATIENT)
Dept: FAMILY MEDICINE | Facility: CLINIC | Age: 34
End: 2019-10-06

## 2019-10-06 DIAGNOSIS — R10.11 RUQ PAIN: Primary | ICD-10-CM

## 2019-10-25 LAB
ALBUMIN/CREAT UR: 4 MCG/MG CREAT
BUN SERPL-MCNC: 13 MG/DL (ref 7–25)
BUN/CREAT SERPL: ABNORMAL (CALC) (ref 6–22)
CALCIUM SERPL-MCNC: 9.4 MG/DL (ref 8.6–10.3)
CHLORIDE SERPL-SCNC: 103 MMOL/L (ref 98–110)
CO2 SERPL-SCNC: 28 MMOL/L (ref 20–32)
CREAT SERPL-MCNC: 0.75 MG/DL (ref 0.6–1.35)
CREAT UR-MCNC: 284 MG/DL (ref 20–320)
GFRSERPLBLD MDRD-ARVRAT: 121 ML/MIN/1.73M2
GLUCOSE SERPL-MCNC: 137 MG/DL (ref 65–99)
HBA1C MFR BLD: 6.5 % OF TOTAL HGB
MICROALBUMIN UR-MCNC: 1.1 MG/DL
POTASSIUM SERPL-SCNC: 4.6 MMOL/L (ref 3.5–5.3)
SODIUM SERPL-SCNC: 139 MMOL/L (ref 135–146)

## 2019-10-25 NOTE — PROGRESS NOTES
FAMILY MEDICINE    Patient Active Problem List   Diagnosis    Tobacco dependence due to cigarettes    Type 2 diabetes mellitus without complication, without long-term current use of insulin    Class 3 severe obesity due to excess calories with serious comorbidity and body mass index (BMI) of 60.0 to 69.9 in adult    CRISTO (obstructive sleep apnea)    Essential hypertension       CC:   Chief Complaint   Patient presents with    Follow-up       SUBJECTIVE:  Matthieu Son   is a 34 y.o. male  - with morbid obesity, venous insufficiency with history lower extremity venous stasis ulcer that have healed, hypertension and recently diagnosed type 2 diabetes presents to follow-up diabetes and blood pressure    1. Diabetes Type 2    Age diagnosed: 33 yo  Current treatment regimen:   metFORMIN (GLUCOPHAGE-XR) 500 MG 24 hr tablet, Take 1 tablet (500 mg total) by mouth daily with breakfast., Disp: 90 tablet, Rfl: 0    Side effects from treatment: denies  Complications of diabetes: none    Glucometer: yes  Glucose monitoring: every once in a while reports fasting and doing well so monitors monthly and last 99 mg/dL    Lab Results       Component                Value               Date                       HGBA1C                   6.5 (H)             10/24/2019                   Last eye exam: 7/23/19  Last foot exam: 6/25/19    Vaccines:   Influenza: due today 10/28/19  Pneumovax: 23:  7/29/19  Prevnar 13: NA    2. Hypertension  Age diagnosed: 33 yo    Current medication treatment:   hydroCHLOROthiazide (HYDRODIURIL) 25 MG tablet, Take 1 tablet (25 mg total) by mouth once daily., Disp: 90 tablet, Rfl: 0    Medication side effects: denies  Exercise regimen: walking  Dietary treatment: low Na and portion control with good weight loss    Home BP cuff:  none  How often does patient monitoring BP? NA  Last home BP reading: NA     Last 14 day average of home BP reading: NA    BP Readings from Last 5 Encounters:  07/29/19 :  136/88  06/28/19 : (!) 150/98  05/28/19 : 136/84    3. Concerns for Sleep Apnea  - wife reports +apnea with gasping and snoring  - poor sleep and fatigued during the day  - last [place] = 13  - sleep study ordered last visit 6/28/19 and pt was contacted but he has not scheduled appt yet    Wt Readings from Last 5 Encounters:   10/28/19 (!) 211.2 kg (465 lb 9.6 oz)   07/29/19 (!) 221.4 kg (488 lb)   06/28/19 (!) 224.8 kg (495 lb 8 oz)   05/28/19 (!) 229.5 kg (506 lb)         ROS: Review of Systems   Constitutional: Negative.    HENT: Negative.    Eyes: Negative.    Respiratory: Positive for apnea.    Cardiovascular: Negative.    Gastrointestinal: Negative.    Endocrine: Negative.    Genitourinary: Negative.    Musculoskeletal: Negative.    Skin: Negative.    Allergic/Immunologic: Negative.    Neurological: Negative.    Hematological: Negative.    Psychiatric/Behavioral: Negative.        Past Medical History:   Diagnosis Date    Venous stasis ulcer of calf limited to breakdown of skin with varicose veins 6/28/2019    Venous ulcers of both lower extremities 5/29/2019       Past Surgical History:   Procedure Laterality Date    WISDOM TOOTH EXTRACTION         Family History   Problem Relation Age of Onset    Diabetes Mother     Hypertension Mother     Breast cancer Mother     Hyperlipidemia Mother     Cancer Mother         breast cancer    Diabetes Father     Hypertension Father     Hyperlipidemia Father     Peripheral vascular disease Father     Heart attack Father     No Known Problems Sister     No Known Problems Son     Colon cancer Maternal Grandmother     Heart disease Maternal Grandfather     No Known Problems Paternal Grandmother     No Known Problems Paternal Grandfather        Social History     Tobacco Use    Smoking status: Current Every Day Smoker     Packs/day: 1.00     Types: Cigarettes     Start date: 2/9/2003    Smokeless tobacco: Never Used   Substance Use Topics    Alcohol use:  "Yes     Comment: social    Drug use: Never       Social History     Social History Narrative     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 Smoker since 17 yo about 1 ppd. Denies illicit drgus       ALLERGIES: Review of patient's allergies indicates:  No Known Allergies    MEDS:   Current Outpatient Medications:     aspirin (ECOTRIN) 81 MG EC tablet, Take 81 mg by mouth once daily., Disp: , Rfl:     blood sugar diagnostic Strp, 1 strip by Misc.(Non-Drug; Combo Route) route once daily., Disp: 100 each, Rfl: 3    blood-glucose meter kit, Use as instructed, Disp: 1 each, Rfl: 0    lancets Misc, 1 lancet by Misc.(Non-Drug; Combo Route) route once daily., Disp: 100 each, Rfl: 3    multivit-minerals/folic acid (ONE-A-DAY MEN VITACRAVES ORAL), Take 1 tablet by mouth once daily., Disp: , Rfl:     TRUE METRIX GLUCOSE METER Misc, USE UTD, Disp: , Rfl: 0    TRUEPLUS LANCETS 33 gauge Misc, CHECK BLOOD SUGAR QD, Disp: , Rfl: 3    hydroCHLOROthiazide (HYDRODIURIL) 25 MG tablet, Take 1 tablet (25 mg total) by mouth once daily., Disp: 90 tablet, Rfl: 1    losartan (COZAAR) 50 MG tablet, Take 1 tablet (50 mg total) by mouth once daily., Disp: 30 tablet, Rfl: 0    metFORMIN (GLUCOPHAGE-XR) 500 MG 24 hr tablet, Take 1 tablet (500 mg total) by mouth once daily., Disp: 90 tablet, Rfl: 1    OBJECTIVE:   Vitals:    10/28/19 0748 10/28/19 0752 10/28/19 0800   BP: (!) 156/106 (!) 146/100 (!) 148/94   BP Location: Left arm Left arm  Comment: taken on forearm    Patient Position: Sitting Sitting    BP Method: Large (Manual) Large (Manual)    Pulse: 86     Temp: 97.5 °F (36.4 °C)     TempSrc: Oral     SpO2: 97%     Weight: (!) 211.2 kg (465 lb 9.6 oz)     Height: 5' 11" (1.803 m)       Body mass index is 64.94 kg/m².    Physical Exam   Constitutional: No distress.   Neck: Neck supple.   Cardiovascular: Normal rate, regular rhythm, normal heart sounds and intact distal pulses. Exam " reveals no gallop and no friction rub.   No murmur heard.  Pulmonary/Chest: Effort normal and breath sounds normal. He has no decreased breath sounds. He has no wheezes. He has no rhonchi. He has no rales.   Abdominal: Soft. Bowel sounds are normal.   Musculoskeletal: He exhibits no edema.   Neurological: He is alert.   Skin: Skin is warm.         PERTINENT RESULTS:   No visits with results within 1 Month(s) from this visit.   Latest known visit with results is:   Office Visit on 07/29/2019   Component Date Value Ref Range Status    Creatinine, Random Ur 10/24/2019 284  20 - 320 mg/dL Final    Microalb, Ur 10/24/2019 1.1  See Note: mg/dL Final    Comment: Reference Range:  Reference Range  Not established      Microalb Creat Ratio 10/24/2019 4  <30 mcg/mg creat Final    Comment:    The ADA defines abnormalities in albumin  excretion as follows:     Category         Result (mcg/mg creatinine)     Normal                    <30  Microalbuminuria            Clinical albuminuria   > OR = 300     The ADA recommends that at least two of three  specimens collected within a 3-6 month period be  abnormal before considering a patient to be  within a diagnostic category.      Hemoglobin A1C 10/24/2019 6.5* <5.7 % of total Hgb Final    Comment: For someone without known diabetes, a hemoglobin A1c  value of 6.5% or greater indicates that they may have   diabetes and this should be confirmed with a follow-up   test.     For someone with known diabetes, a value <7% indicates   that their diabetes is well controlled and a value   greater than or equal to 7% indicates suboptimal   control. A1c targets should be individualized based on   duration of diabetes, age, comorbid conditions, and   other considerations.     Currently, no consensus exists regarding use of  hemoglobin A1c for diagnosis of diabetes for children.          Glucose 10/24/2019 137* 65 - 99 mg/dL Final    Comment:               Fasting reference interval      For someone without known diabetes, a glucose  value >125 mg/dL indicates that they may have  diabetes and this should be confirmed with a  follow-up test.         BUN, Bld 10/24/2019 13  7 - 25 mg/dL Final    Creatinine 10/24/2019 0.75  0.60 - 1.35 mg/dL Final    eGFR if non African American 10/24/2019 121  > OR = 60 mL/min/1.73m2 Final    eGFR if  10/24/2019 140  > OR = 60 mL/min/1.73m2 Final    BUN/Creatinine Ratio 10/24/2019 NOT APPLICABLE  6 - 22 (calc) Final    Sodium 10/24/2019 139  135 - 146 mmol/L Final    Potassium 10/24/2019 4.6  3.5 - 5.3 mmol/L Final    Chloride 10/24/2019 103  98 - 110 mmol/L Final    CO2 10/24/2019 28  20 - 32 mmol/L Final    Calcium 10/24/2019 9.4  8.6 - 10.3 mg/dL Final       ASSESSMENT:  Problem List Items Addressed This Visit        Cardiac/Vascular    Essential hypertension - Primary    Current Assessment & Plan     - BP above goal and counseling on hypertension  - recommend call sleep study department and schedule home sleep since CRISTO may be contributing to blood pressure issues  - add Losartan 50 mg daily  - continue HCTZ 25 mg daily  - counseling regarding new medication including expected results, potential side effects, and appropriate use. Questions elicited and answered  - encouraged to patient to notify me of any questions or concerns         Relevant Medications    hydroCHLOROthiazide (HYDRODIURIL) 25 MG tablet    losartan (COZAAR) 50 MG tablet       Endocrine    Type 2 diabetes mellitus without complication, without long-term current use of insulin    Current Assessment & Plan     Lab Results   Component Value Date    HGBA1C 6.5 (H) 10/24/2019     - well controlled  - continue current medications         Relevant Medications    metFORMIN (GLUCOPHAGE-XR) 500 MG 24 hr tablet       Other    Tobacco dependence due to cigarettes    Current Assessment & Plan     - recommend quit smoking  - readiness 6/10 and would try nicotine supplementation  -  dicussed smoking cessation program pt declined at this time         Class 3 severe obesity due to excess calories with serious comorbidity and body mass index (BMI) of 60.0 to 69.9 in adult    Current Assessment & Plan     - great job and continuing with weight loss goal and BMI decreased from 70 to 65 with 41 lbs lost since initial visit         CRISTO (obstructive sleep apnea)    Current Assessment & Plan     - concern that CRISTO may be affecting BP  - Home sleep study previously order and gave pt # to schedule           Other Visit Diagnoses     Needs flu shot        Relevant Orders    Influenza - Quadrivalent (PF)          PLAN:   Orders Placed This Encounter    Influenza - Quadrivalent (PF)    metFORMIN (GLUCOPHAGE-XR) 500 MG 24 hr tablet    hydroCHLOROthiazide (HYDRODIURIL) 25 MG tablet    losartan (COZAAR) 50 MG tablet     Follow-up in 1 month for hypertension follow-up    Dr. Ebony Collins D.O.   Family Medicine

## 2019-10-28 ENCOUNTER — OFFICE VISIT (OUTPATIENT)
Dept: FAMILY MEDICINE | Facility: CLINIC | Age: 34
End: 2019-10-28
Payer: COMMERCIAL

## 2019-10-28 VITALS
DIASTOLIC BLOOD PRESSURE: 94 MMHG | HEART RATE: 86 BPM | WEIGHT: 315 LBS | TEMPERATURE: 98 F | HEIGHT: 71 IN | SYSTOLIC BLOOD PRESSURE: 148 MMHG | BODY MASS INDEX: 44.1 KG/M2 | OXYGEN SATURATION: 97 %

## 2019-10-28 DIAGNOSIS — F17.210 TOBACCO DEPENDENCE DUE TO CIGARETTES: ICD-10-CM

## 2019-10-28 DIAGNOSIS — I10 ESSENTIAL HYPERTENSION: Primary | ICD-10-CM

## 2019-10-28 DIAGNOSIS — Z23 NEEDS FLU SHOT: ICD-10-CM

## 2019-10-28 DIAGNOSIS — E66.01 CLASS 3 SEVERE OBESITY DUE TO EXCESS CALORIES WITH SERIOUS COMORBIDITY AND BODY MASS INDEX (BMI) OF 60.0 TO 69.9 IN ADULT: ICD-10-CM

## 2019-10-28 DIAGNOSIS — G47.33 OSA (OBSTRUCTIVE SLEEP APNEA): ICD-10-CM

## 2019-10-28 DIAGNOSIS — E11.9 TYPE 2 DIABETES MELLITUS WITHOUT COMPLICATION, WITHOUT LONG-TERM CURRENT USE OF INSULIN: ICD-10-CM

## 2019-10-28 PROBLEM — E66.813 CLASS 3 SEVERE OBESITY DUE TO EXCESS CALORIES WITH SERIOUS COMORBIDITY AND BODY MASS INDEX (BMI) OF 60.0 TO 69.9 IN ADULT: Status: ACTIVE | Noted: 2019-05-29

## 2019-10-28 PROCEDURE — 99214 OFFICE O/P EST MOD 30 MIN: CPT | Mod: 25,S$GLB,, | Performed by: FAMILY MEDICINE

## 2019-10-28 PROCEDURE — 3008F BODY MASS INDEX DOCD: CPT | Mod: CPTII,S$GLB,, | Performed by: FAMILY MEDICINE

## 2019-10-28 PROCEDURE — 90471 FLU VACCINE (QUAD) GREATER THAN OR EQUAL TO 3YO PRESERVATIVE FREE IM: ICD-10-PCS | Mod: S$GLB,,, | Performed by: FAMILY MEDICINE

## 2019-10-28 PROCEDURE — 99214 PR OFFICE/OUTPT VISIT, EST, LEVL IV, 30-39 MIN: ICD-10-PCS | Mod: 25,S$GLB,, | Performed by: FAMILY MEDICINE

## 2019-10-28 PROCEDURE — 3077F SYST BP >= 140 MM HG: CPT | Mod: CPTII,S$GLB,, | Performed by: FAMILY MEDICINE

## 2019-10-28 PROCEDURE — 90686 FLU VACCINE (QUAD) GREATER THAN OR EQUAL TO 3YO PRESERVATIVE FREE IM: ICD-10-PCS | Mod: S$GLB,,, | Performed by: FAMILY MEDICINE

## 2019-10-28 PROCEDURE — 99999 PR PBB SHADOW E&M-EST. PATIENT-LVL V: ICD-10-PCS | Mod: PBBFAC,,, | Performed by: FAMILY MEDICINE

## 2019-10-28 PROCEDURE — 3080F DIAST BP >= 90 MM HG: CPT | Mod: CPTII,S$GLB,, | Performed by: FAMILY MEDICINE

## 2019-10-28 PROCEDURE — 3080F PR MOST RECENT DIASTOLIC BLOOD PRESSURE >= 90 MM HG: ICD-10-PCS | Mod: CPTII,S$GLB,, | Performed by: FAMILY MEDICINE

## 2019-10-28 PROCEDURE — 90686 IIV4 VACC NO PRSV 0.5 ML IM: CPT | Mod: S$GLB,,, | Performed by: FAMILY MEDICINE

## 2019-10-28 PROCEDURE — 3044F HG A1C LEVEL LT 7.0%: CPT | Mod: CPTII,S$GLB,, | Performed by: FAMILY MEDICINE

## 2019-10-28 PROCEDURE — 90471 IMMUNIZATION ADMIN: CPT | Mod: S$GLB,,, | Performed by: FAMILY MEDICINE

## 2019-10-28 PROCEDURE — 3008F PR BODY MASS INDEX (BMI) DOCUMENTED: ICD-10-PCS | Mod: CPTII,S$GLB,, | Performed by: FAMILY MEDICINE

## 2019-10-28 PROCEDURE — 3044F PR MOST RECENT HEMOGLOBIN A1C LEVEL <7.0%: ICD-10-PCS | Mod: CPTII,S$GLB,, | Performed by: FAMILY MEDICINE

## 2019-10-28 PROCEDURE — 3077F PR MOST RECENT SYSTOLIC BLOOD PRESSURE >= 140 MM HG: ICD-10-PCS | Mod: CPTII,S$GLB,, | Performed by: FAMILY MEDICINE

## 2019-10-28 PROCEDURE — 99999 PR PBB SHADOW E&M-EST. PATIENT-LVL V: CPT | Mod: PBBFAC,,, | Performed by: FAMILY MEDICINE

## 2019-10-28 RX ORDER — METFORMIN HYDROCHLORIDE 500 MG/1
500 TABLET, EXTENDED RELEASE ORAL DAILY
Qty: 90 TABLET | Refills: 1 | Status: SHIPPED | OUTPATIENT
Start: 2019-10-28 | End: 2020-03-23 | Stop reason: SDUPTHER

## 2019-10-28 RX ORDER — LOSARTAN POTASSIUM 50 MG/1
50 TABLET ORAL DAILY
Qty: 30 TABLET | Refills: 0 | Status: SHIPPED | OUTPATIENT
Start: 2019-10-28 | End: 2019-11-26 | Stop reason: SDUPTHER

## 2019-10-28 RX ORDER — HYDROCHLOROTHIAZIDE 25 MG/1
25 TABLET ORAL DAILY
Qty: 90 TABLET | Refills: 1 | Status: SHIPPED | OUTPATIENT
Start: 2019-10-28 | End: 2020-03-23 | Stop reason: SDUPTHER

## 2019-10-28 NOTE — ASSESSMENT & PLAN NOTE
- BP above goal and counseling on hypertension  - recommend call sleep study department and schedule home sleep since CRISTO may be contributing to blood pressure issues  - add Losartan 50 mg daily  - continue HCTZ 25 mg daily  - counseling regarding new medication including expected results, potential side effects, and appropriate use. Questions elicited and answered  - encouraged to patient to notify me of any questions or concerns

## 2019-10-28 NOTE — ASSESSMENT & PLAN NOTE
- concern that CRISTO may be affecting BP  - Home sleep study previously order and gave pt # to schedule

## 2019-10-28 NOTE — ASSESSMENT & PLAN NOTE
- great job and continuing with weight loss goal and BMI decreased from 70 to 65 with 41 lbs lost since initial visit

## 2019-10-28 NOTE — ASSESSMENT & PLAN NOTE
Lab Results   Component Value Date    HGBA1C 6.5 (H) 10/24/2019     - well controlled  - continue current medications

## 2019-10-28 NOTE — PATIENT INSTRUCTIONS
1. Add Losartan 50 mg daily to Hydrochlorothiazide 25 mg daily  - goal blood pressure <130/85  2. Call Sleep study [phone] to schedule home sleep study

## 2019-11-21 ENCOUNTER — TELEPHONE (OUTPATIENT)
Dept: FAMILY MEDICINE | Facility: CLINIC | Age: 34
End: 2019-11-21

## 2019-11-21 NOTE — TELEPHONE ENCOUNTER
----- Message from Savanah Janneth sent at 11/21/2019  2:04 PM CST -----  Contact: self, [phone] (M)  Patient requests to speak with you, states he was told his Losartan prescription was recalled. Needs alternative sent in.  Please advise.

## 2019-11-21 NOTE — TELEPHONE ENCOUNTER
Called and spoke with pt. Called his pharmacy. His medication is not involved in recall. He can continue medication and he has appt to see me 11/27/19.   Dr. Ebony Collins D.O.   [facility]

## 2019-11-26 NOTE — PROGRESS NOTES
FAMILY MEDICINE    Patient Active Problem List   Diagnosis    Tobacco dependence due to cigarettes    Type 2 diabetes mellitus without complication, without long-term current use of insulin    Class 3 severe obesity due to excess calories with serious comorbidity and body mass index (BMI) of 60.0 to 69.9 in adult    CRISTO (obstructive sleep apnea)    Essential hypertension    Venous insufficiency of both lower extremities       CC:   Chief Complaint   Patient presents with    Follow-up       SUBJECTIVE:  Matthieu Son   is a 34 y.o. male  - with morbid obesity, venous insufficiency with history lower extremity venous stasis ulcer that have healed, hypertension and recently diagnosed type 2 diabetes presents to blood pressure since starting Losartan     1. Hypertension  Age diagnosed: 35 yo    Current medication treatment:   hydroCHLOROthiazide (HYDRODIURIL) 25 MG tablet, Take 1 tablet (25 mg total) by mouth once daily., Disp: 90 tablet, Rfl: 1  losartan (COZAAR) 50 MG tablet, Take 1 tablet (50 mg total) by mouth once daily., Disp: 30 tablet, Rfl: 0    Medication side effects: denies  Exercise regimen: walking  Dietary treatment: low Na and portion control with good weight loss    Home BP cuff:  yes  How often does patient monitoring BP? weekly  Last home BP readin/80    BP Readings from Last 5 Encounters:  19 : 138/86  10/28/19 : (!) 148/94  19 : 136/88  19 : (!) 150/98  19 : 136/84    2. Diabetes Type 2    Age diagnosed: 35 yo  Current treatment regimen:   metFORMIN (GLUCOPHAGE-XR) 500 MG 24 hr tablet, Take 1 tablet (500 mg total) by mouth daily with breakfast., Disp: 90 tablet, Rfl: 0    Side effects from treatment: denies  Complications of diabetes: none    Glucometer: yes  Glucose monitoring: every once in a while reports fasting and doing well so monitors monthly and last 99 mg/dL    Lab Results       Component                Value               Date                        HGBA1C                   6.5 (H)             10/24/2019                   Last eye exam: 7/23/19  Last foot exam: 6/25/19    Vaccines:   Influenza:10/28/19  Pneumovax: 23:  7/29/19  Prevnar 13: NA    Wt Readings from Last 5 Encounters:   11/27/19 117.8 kg (259 lb 9.6 oz)   10/28/19 (!) 211.2 kg (465 lb 9.6 oz)   07/29/19 (!) 221.4 kg (488 lb)   06/28/19 (!) 224.8 kg (495 lb 8 oz)   05/28/19 (!) 229.5 kg (506 lb)         ROS: Review of Systems   Constitutional: Negative.  Negative for activity change and unexpected weight change.   HENT: Negative.  Negative for hearing loss, rhinorrhea and trouble swallowing.    Eyes: Negative.  Negative for discharge and visual disturbance.   Respiratory: Negative.  Negative for chest tightness and wheezing.    Cardiovascular: Negative.  Negative for chest pain and palpitations.   Gastrointestinal: Negative.  Negative for blood in stool, constipation, diarrhea and vomiting.   Endocrine: Negative.  Negative for polydipsia and polyuria.   Genitourinary: Negative.  Negative for difficulty urinating, hematuria and urgency.   Musculoskeletal: Negative.  Negative for arthralgias, joint swelling and neck pain.   Skin: Negative.    Allergic/Immunologic: Negative.    Neurological: Negative.  Negative for weakness and headaches.   Hematological: Negative.    Psychiatric/Behavioral: Negative.  Negative for confusion and dysphoric mood.       Past Medical History:   Diagnosis Date    Venous stasis ulcer of calf limited to breakdown of skin with varicose veins 6/28/2019    Venous ulcers of both lower extremities 5/29/2019       Past Surgical History:   Procedure Laterality Date    WISDOM TOOTH EXTRACTION         Family History   Problem Relation Age of Onset    Diabetes Mother     Hypertension Mother     Breast cancer Mother     Hyperlipidemia Mother     Cancer Mother         breast cancer    Diabetes Father     Hypertension Father     Hyperlipidemia Father     Peripheral vascular  disease Father     Heart attack Father     No Known Problems Sister     No Known Problems Son     Colon cancer Maternal Grandmother     Heart disease Maternal Grandfather     No Known Problems Paternal Grandmother     No Known Problems Paternal Grandfather        Social History     Tobacco Use    Smoking status: Current Every Day Smoker     Packs/day: 1.00     Types: Cigarettes     Start date: 2/9/2003    Smokeless tobacco: Never Used   Substance Use Topics    Alcohol use: Yes     Frequency: 2-4 times a month     Drinks per session: 3 or 4     Binge frequency: Monthly     Comment: social    Drug use: Never       Social History     Social History Narrative     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 Occasional social alcohol use. Smoker since 19 yo about 1 ppd. Denies illicit drgus       ALLERGIES: Review of patient's allergies indicates:  No Known Allergies    MEDS:   Current Outpatient Medications:     aspirin (ECOTRIN) 81 MG EC tablet, Take 81 mg by mouth once daily., Disp: , Rfl:     blood sugar diagnostic Strp, 1 strip by Misc.(Non-Drug; Combo Route) route once daily., Disp: 100 each, Rfl: 3    blood-glucose meter kit, Use as instructed, Disp: 1 each, Rfl: 0    hydroCHLOROthiazide (HYDRODIURIL) 25 MG tablet, Take 1 tablet (25 mg total) by mouth once daily., Disp: 90 tablet, Rfl: 1    lancets Misc, 1 lancet by Misc.(Non-Drug; Combo Route) route once daily., Disp: 100 each, Rfl: 3    metFORMIN (GLUCOPHAGE-XR) 500 MG 24 hr tablet, Take 1 tablet (500 mg total) by mouth once daily., Disp: 90 tablet, Rfl: 1    TRUE METRIX GLUCOSE METER Misc, USE UTD, Disp: , Rfl: 0    TRUEPLUS LANCETS 33 gauge Misc, CHECK BLOOD SUGAR QD, Disp: , Rfl: 3    losartan (COZAAR) 50 MG tablet, Take 1 tablet (50 mg total) by mouth once daily., Disp: 90 tablet, Rfl: 1    multivit-minerals/folic acid (ONE-A-DAY MEN VITACRAVES ORAL), Take 1 tablet by mouth once daily., Disp: , Rfl:     OBJECTIVE:  "  Vitals:    11/27/19 0729   BP: 138/86   BP Location: Left arm   Patient Position: Sitting   BP Method: Large (Manual)   Pulse: 83   Temp: 97.7 °F (36.5 °C)   TempSrc: Oral   SpO2: 95%   Weight: 117.8 kg (259 lb 9.6 oz)   Height: 5' 11" (1.803 m)     Body mass index is 36.21 kg/m².    Physical Exam   Constitutional: He appears well-developed. No distress.   Neck: Neck supple.   Cardiovascular: Normal rate, regular rhythm, normal heart sounds and intact distal pulses. Exam reveals no gallop and no friction rub.   No murmur heard.  Pulmonary/Chest: Effort normal and breath sounds normal. He has no decreased breath sounds. He has no wheezes. He has no rhonchi. He has no rales.   Musculoskeletal: He exhibits no edema.   Calf circumference 21.5 inches bilaterally   Neurological: He is alert.   Skin: Skin is warm.         PERTINENT RESULTS:   No visits with results within 1 Month(s) from this visit.   Latest known visit with results is:   Office Visit on 07/29/2019   Component Date Value Ref Range Status    Creatinine, Random Ur 10/24/2019 284  20 - 320 mg/dL Final    Microalb, Ur 10/24/2019 1.1  See Note: mg/dL Final    Comment: Reference Range:  Reference Range  Not established      Microalb Creat Ratio 10/24/2019 4  <30 mcg/mg creat Final    Comment:    The ADA defines abnormalities in albumin  excretion as follows:     Category         Result (mcg/mg creatinine)     Normal                    <30  Microalbuminuria            Clinical albuminuria   > OR = 300     The ADA recommends that at least two of three  specimens collected within a 3-6 month period be  abnormal before considering a patient to be  within a diagnostic category.      Hemoglobin A1C 10/24/2019 6.5* <5.7 % of total Hgb Final    Comment: For someone without known diabetes, a hemoglobin A1c  value of 6.5% or greater indicates that they may have   diabetes and this should be confirmed with a follow-up   test.     For someone with known " diabetes, a value <7% indicates   that their diabetes is well controlled and a value   greater than or equal to 7% indicates suboptimal   control. A1c targets should be individualized based on   duration of diabetes, age, comorbid conditions, and   other considerations.     Currently, no consensus exists regarding use of  hemoglobin A1c for diagnosis of diabetes for children.          Glucose 10/24/2019 137* 65 - 99 mg/dL Final    Comment:               Fasting reference interval     For someone without known diabetes, a glucose  value >125 mg/dL indicates that they may have  diabetes and this should be confirmed with a  follow-up test.         BUN, Bld 10/24/2019 13  7 - 25 mg/dL Final    Creatinine 10/24/2019 0.75  0.60 - 1.35 mg/dL Final    eGFR if non African American 10/24/2019 121  > OR = 60 mL/min/1.73m2 Final    eGFR if  10/24/2019 140  > OR = 60 mL/min/1.73m2 Final    BUN/Creatinine Ratio 10/24/2019 NOT APPLICABLE  6 - 22 (calc) Final    Sodium 10/24/2019 139  135 - 146 mmol/L Final    Potassium 10/24/2019 4.6  3.5 - 5.3 mmol/L Final    Chloride 10/24/2019 103  98 - 110 mmol/L Final    CO2 10/24/2019 28  20 - 32 mmol/L Final    Calcium 10/24/2019 9.4  8.6 - 10.3 mg/dL Final       ASSESSMENT:  Problem List Items Addressed This Visit        Cardiac/Vascular    Essential hypertension    Current Assessment & Plan     - well controlled  - continue current medications         Relevant Medications    losartan (COZAAR) 50 MG tablet    Venous insufficiency of both lower extremities    Overview     - with history of venous stasis ulcers 2019  - calf circumference 21.5 inches bilaterally         Current Assessment & Plan     - current stockings appear too small and pt reports sliding down frequently  - he bought XL size which max is 19 inches calf circ  - recommend LFC or XLFC size 20-24 inches and referred to medical supply store to purchace            Endocrine    Type 2 diabetes mellitus  without complication, without long-term current use of insulin - Primary    Current Assessment & Plan     Lab Results   Component Value Date    HGBA1C 6.5 (H) 10/24/2019     - well controlled  - continue current medications         Relevant Orders    Hemoglobin A1c       Other    CRISTO (obstructive sleep apnea)    Overview     - sleep study ordered last visit 6/28/19               PLAN:   Orders Placed This Encounter    Hemoglobin A1c    losartan (COZAAR) 50 MG tablet     Follow-up in 4 months with labs     Dr. Ebony Collins D.O.   Family Medicine

## 2019-11-27 ENCOUNTER — OFFICE VISIT (OUTPATIENT)
Dept: FAMILY MEDICINE | Facility: CLINIC | Age: 34
End: 2019-11-27
Payer: COMMERCIAL

## 2019-11-27 VITALS
SYSTOLIC BLOOD PRESSURE: 138 MMHG | OXYGEN SATURATION: 98 % | TEMPERATURE: 98 F | DIASTOLIC BLOOD PRESSURE: 86 MMHG | HEART RATE: 83 BPM | HEIGHT: 71 IN | WEIGHT: 259.63 LBS | BODY MASS INDEX: 36.35 KG/M2

## 2019-11-27 DIAGNOSIS — I87.2 VENOUS INSUFFICIENCY OF BOTH LOWER EXTREMITIES: ICD-10-CM

## 2019-11-27 DIAGNOSIS — E11.9 TYPE 2 DIABETES MELLITUS WITHOUT COMPLICATION, WITHOUT LONG-TERM CURRENT USE OF INSULIN: Primary | ICD-10-CM

## 2019-11-27 DIAGNOSIS — I10 ESSENTIAL HYPERTENSION: ICD-10-CM

## 2019-11-27 DIAGNOSIS — G47.33 OSA (OBSTRUCTIVE SLEEP APNEA): ICD-10-CM

## 2019-11-27 PROCEDURE — 99999 PR PBB SHADOW E&M-EST. PATIENT-LVL III: CPT | Mod: PBBFAC,,, | Performed by: FAMILY MEDICINE

## 2019-11-27 PROCEDURE — 3079F PR MOST RECENT DIASTOLIC BLOOD PRESSURE 80-89 MM HG: ICD-10-PCS | Mod: CPTII,S$GLB,, | Performed by: FAMILY MEDICINE

## 2019-11-27 PROCEDURE — 3044F PR MOST RECENT HEMOGLOBIN A1C LEVEL <7.0%: ICD-10-PCS | Mod: CPTII,S$GLB,, | Performed by: FAMILY MEDICINE

## 2019-11-27 PROCEDURE — 3008F PR BODY MASS INDEX (BMI) DOCUMENTED: ICD-10-PCS | Mod: CPTII,S$GLB,, | Performed by: FAMILY MEDICINE

## 2019-11-27 PROCEDURE — 99214 PR OFFICE/OUTPT VISIT, EST, LEVL IV, 30-39 MIN: ICD-10-PCS | Mod: S$GLB,,, | Performed by: FAMILY MEDICINE

## 2019-11-27 PROCEDURE — 99214 OFFICE O/P EST MOD 30 MIN: CPT | Mod: S$GLB,,, | Performed by: FAMILY MEDICINE

## 2019-11-27 PROCEDURE — 3075F PR MOST RECENT SYSTOLIC BLOOD PRESS GE 130-139MM HG: ICD-10-PCS | Mod: CPTII,S$GLB,, | Performed by: FAMILY MEDICINE

## 2019-11-27 PROCEDURE — 3044F HG A1C LEVEL LT 7.0%: CPT | Mod: CPTII,S$GLB,, | Performed by: FAMILY MEDICINE

## 2019-11-27 PROCEDURE — 3075F SYST BP GE 130 - 139MM HG: CPT | Mod: CPTII,S$GLB,, | Performed by: FAMILY MEDICINE

## 2019-11-27 PROCEDURE — 99999 PR PBB SHADOW E&M-EST. PATIENT-LVL III: ICD-10-PCS | Mod: PBBFAC,,, | Performed by: FAMILY MEDICINE

## 2019-11-27 PROCEDURE — 3079F DIAST BP 80-89 MM HG: CPT | Mod: CPTII,S$GLB,, | Performed by: FAMILY MEDICINE

## 2019-11-27 PROCEDURE — 3008F BODY MASS INDEX DOCD: CPT | Mod: CPTII,S$GLB,, | Performed by: FAMILY MEDICINE

## 2019-11-27 RX ORDER — LOSARTAN POTASSIUM 50 MG/1
50 TABLET ORAL DAILY
Qty: 90 TABLET | Refills: 1 | Status: SHIPPED | OUTPATIENT
Start: 2019-11-27 | End: 2020-06-12

## 2019-11-27 NOTE — ASSESSMENT & PLAN NOTE
- current stockings appear too small and pt reports sliding down frequently  - he bought XL size which max is 19 inches calf circ  - recommend LFC or XLFC size 20-24 inches and referred to medical supply store to purchace

## 2020-03-23 DIAGNOSIS — E11.9 TYPE 2 DIABETES MELLITUS WITHOUT COMPLICATION, WITHOUT LONG-TERM CURRENT USE OF INSULIN: ICD-10-CM

## 2020-03-23 DIAGNOSIS — I10 ESSENTIAL HYPERTENSION: ICD-10-CM

## 2020-03-23 RX ORDER — HYDROCHLOROTHIAZIDE 25 MG/1
25 TABLET ORAL DAILY
Qty: 90 TABLET | Refills: 1 | Status: SHIPPED | OUTPATIENT
Start: 2020-03-23 | End: 2020-07-27 | Stop reason: SDUPTHER

## 2020-03-23 RX ORDER — METFORMIN HYDROCHLORIDE 500 MG/1
500 TABLET, EXTENDED RELEASE ORAL DAILY
Qty: 90 TABLET | Refills: 1 | Status: SHIPPED | OUTPATIENT
Start: 2020-03-23 | End: 2020-07-27 | Stop reason: SDUPTHER

## 2020-06-11 DIAGNOSIS — I10 ESSENTIAL HYPERTENSION: ICD-10-CM

## 2020-06-12 RX ORDER — LOSARTAN POTASSIUM 50 MG/1
TABLET ORAL
Qty: 90 TABLET | Refills: 0 | Status: SHIPPED | OUTPATIENT
Start: 2020-06-12 | End: 2020-07-27 | Stop reason: SDUPTHER

## 2020-06-19 DIAGNOSIS — E11.9 TYPE 2 DIABETES MELLITUS WITHOUT COMPLICATION: ICD-10-CM

## 2020-07-26 ENCOUNTER — PATIENT MESSAGE (OUTPATIENT)
Dept: FAMILY MEDICINE | Facility: CLINIC | Age: 35
End: 2020-07-26

## 2020-07-27 NOTE — PROGRESS NOTES
VIRTUAL/TELEMEDICINE VISIT   FAMILY MEDICINE   [facility]     The patient location is: [facility]  The chief complaint leading to consultation is: follow-up diabetes and HTN  Visit type: Virtual visit with synchronous audio and video  Total time spent: 25 mins  Each patient to whom he or she provides medical services by telemedicine is:  (1) informed of the relationship between the physician and patient and the respective role of any other health care provider with respect to management of the patient; and (2) notified that he or she may decline to receive medical services by telemedicine and may withdraw from such care at any time.    FAMILY MEDICINE    Patient Active Problem List   Diagnosis    Tobacco dependence due to cigarettes    Type 2 diabetes mellitus without complication, without long-term current use of insulin    Class 3 severe obesity due to excess calories with serious comorbidity and body mass index (BMI) of 60.0 to 69.9 in adult    CRISTO (obstructive sleep apnea)    Essential hypertension    Venous insufficiency of both lower extremities       CC: No chief complaint on file.      SUBJECTIVE:  Matthieu Son   is a 35 y.o. male  - with morbid obesity, venous insufficiency with history lower extremity venous stasis ulcer that have healed, hypertension and type 2 diabetes presents for follow-up diabetes and hypertension.     Last seen 11/2019. Reports that he was initially laid off during Covid-19 and did not have health insurance. He restarted working about 2 weeks ago. He has been otherwise doing well. He does not think that he has gained weight but has not monitored     1. Hypertension  Age diagnosed: 35 yo     Current medication treatment:   hydroCHLOROthiazide (HYDRODIURIL) 25 MG tablet, Take 1 tablet (25 mg total) by mouth once daily., Disp: 90 tablet, Rfl: 1  losartan (COZAAR) 50 MG tablet, Take 1 tablet (50 mg total) by mouth once daily., Disp: 30 tablet, Rfl: 0     Medication side  effects: denies  Exercise regimen: walking  Dietary treatment: low Na and portion control with good weight loss     Home BP cuff:  yes     2. Diabetes Type 2     Age diagnosed: 33 yo  Current treatment regimen:   metFORMIN (GLUCOPHAGE-XR) 500 MG 24 hr tablet, Take 1 tablet (500 mg total) by mouth daily with breakfast., Disp: 90 tablet, Rfl: 0     Side effects from treatment: denies  Complications of diabetes: none     Glucometer: yes  Glucose monitoring: every once in a while    Fasting glucose 100-120's mg/dL     Lab Results       Component                Value               Date                       HGBA1C                   6.5 (H)             10/24/2019              Last eye exam: 7/23/19  Last foot exam: 6/25/19     Vaccines:   Influenza:10/28/19  Pneumovax: 23:  7/29/19  Prevnar 13: NA      ROS: Review of Systems   Constitutional: Negative.    HENT: Negative.    Eyes: Negative.    Respiratory: Negative.    Cardiovascular: Negative.    Gastrointestinal: Negative.    Endocrine: Negative.    Genitourinary: Negative.    Musculoskeletal: Negative.    Skin: Negative.    Allergic/Immunologic: Negative.    Neurological: Negative.    Hematological: Negative.    Psychiatric/Behavioral: Negative.        Past Medical History:   Diagnosis Date    Venous stasis ulcer of calf limited to breakdown of skin with varicose veins 6/28/2019    Venous ulcers of both lower extremities 5/29/2019       Past Surgical History:   Procedure Laterality Date    WISDOM TOOTH EXTRACTION         Family History   Problem Relation Age of Onset    Diabetes Mother     Hypertension Mother     Breast cancer Mother     Hyperlipidemia Mother     Cancer Mother         breast cancer    Diabetes Father     Hypertension Father     Hyperlipidemia Father     Peripheral vascular disease Father     Heart attack Father     No Known Problems Sister     No Known Problems Son     Colon cancer Maternal Grandmother     Heart disease Maternal  "Grandfather     No Known Problems Paternal Grandmother     No Known Problems Paternal Grandfather        Social History     Tobacco Use    Smoking status: Current Every Day Smoker     Packs/day: 1.00     Types: Cigarettes     Start date: 2/9/2003    Smokeless tobacco: Never Used   Substance Use Topics    Alcohol use: Yes     Frequency: 2-4 times a month     Drinks per session: 3 or 4     Binge frequency: Monthly     Comment: social    Drug use: Never       Social History     Social History Narrative     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 Smoker since 19 yo about 1 ppd. Denies illicit drgus       ALLERGIES: Review of patient's allergies indicates:  No Known Allergies    MEDS:   Current Outpatient Medications:     aspirin (ECOTRIN) 81 MG EC tablet, Take 81 mg by mouth once daily., Disp: , Rfl:     blood sugar diagnostic Strp, 1 strip by Misc.(Non-Drug; Combo Route) route once daily., Disp: 100 each, Rfl: 3    blood-glucose meter kit, Use as instructed, Disp: 1 each, Rfl: 0    hydroCHLOROthiazide (HYDRODIURIL) 25 MG tablet, Take 1 tablet (25 mg total) by mouth once daily., Disp: 90 tablet, Rfl: 0    lancets Misc, 1 lancet by Misc.(Non-Drug; Combo Route) route once daily., Disp: 100 each, Rfl: 3    losartan (COZAAR) 50 MG tablet, TAKE 1 TABLET(50 MG) BY MOUTH EVERY DAY, Disp: 90 tablet, Rfl: 0    metFORMIN (GLUCOPHAGE-XR) 500 MG XR 24hr tablet, Take 1 tablet (500 mg total) by mouth once daily., Disp: 90 tablet, Rfl: 0    multivit-minerals/folic acid (ONE-A-DAY MEN VITACRAVES ORAL), Take 1 tablet by mouth once daily., Disp: , Rfl:     TRUE METRIX GLUCOSE METER Misc, USE UTD, Disp: , Rfl: 0    TRUEPLUS LANCETS 33 gauge Misc, CHECK BLOOD SUGAR QD, Disp: , Rfl: 3    OBJECTIVE:   Vitals:    07/28/20 0718   BP: 126/80   Height: 5' 11" (1.803 m)     Body mass index is 36.21 kg/m².    Physical Exam  Constitutional:       General: He is not in acute " distress.     Appearance: He is not ill-appearing.   Pulmonary:      Effort: Pulmonary effort is normal.   Neurological:      Mental Status: He is alert.   Psychiatric:         Behavior: Behavior normal.           PERTINENT RESULTS:   No visits with results within 1 Week(s) from this visit.   Latest known visit with results is:   Office Visit on 07/29/2019   Component Date Value Ref Range Status    Creatinine, Random Ur 10/24/2019 284  20 - 320 mg/dL Final    Microalb, Ur 10/24/2019 1.1  See Note: mg/dL Final    Comment: Reference Range:  Reference Range  Not established      Microalb Creat Ratio 10/24/2019 4  <30 mcg/mg creat Final    Comment:    The ADA defines abnormalities in albumin  excretion as follows:     Category         Result (mcg/mg creatinine)     Normal                    <30  Microalbuminuria            Clinical albuminuria   > OR = 300     The ADA recommends that at least two of three  specimens collected within a 3-6 month period be  abnormal before considering a patient to be  within a diagnostic category.      Hemoglobin A1C 10/24/2019 6.5* <5.7 % of total Hgb Final    Comment: For someone without known diabetes, a hemoglobin A1c  value of 6.5% or greater indicates that they may have   diabetes and this should be confirmed with a follow-up   test.     For someone with known diabetes, a value <7% indicates   that their diabetes is well controlled and a value   greater than or equal to 7% indicates suboptimal   control. A1c targets should be individualized based on   duration of diabetes, age, comorbid conditions, and   other considerations.     Currently, no consensus exists regarding use of  hemoglobin A1c for diagnosis of diabetes for children.          Glucose 10/24/2019 137* 65 - 99 mg/dL Final    Comment:               Fasting reference interval     For someone without known diabetes, a glucose  value >125 mg/dL indicates that they may have  diabetes and this should be confirmed  with a  follow-up test.         BUN, Bld 10/24/2019 13  7 - 25 mg/dL Final    Creatinine 10/24/2019 0.75  0.60 - 1.35 mg/dL Final    eGFR if non African American 10/24/2019 121  > OR = 60 mL/min/1.73m2 Final    eGFR if  10/24/2019 140  > OR = 60 mL/min/1.73m2 Final    BUN/Creatinine Ratio 10/24/2019 NOT APPLICABLE  6 - 22 (calc) Final    Sodium 10/24/2019 139  135 - 146 mmol/L Final    Potassium 10/24/2019 4.6  3.5 - 5.3 mmol/L Final    Chloride 10/24/2019 103  98 - 110 mmol/L Final    CO2 10/24/2019 28  20 - 32 mmol/L Final    Calcium 10/24/2019 9.4  8.6 - 10.3 mg/dL Final       ASSESSMENT/PLAN:  Problem List Items Addressed This Visit        Cardiac/Vascular    Essential hypertension    Current Assessment & Plan     - well controlled  - continue current medications         Relevant Medications    hydroCHLOROthiazide (HYDRODIURIL) 25 MG tablet    losartan (COZAAR) 50 MG tablet       Endocrine    Type 2 diabetes mellitus without complication, without long-term current use of insulin - Primary    Current Assessment & Plan     Lab Results   Component Value Date    HGBA1C 6.5 (H) 10/24/2019     - recommend repeat A1C  - continue Metformin  mg daily at this time  - monitor glucose weekly  - due for eye exam         Relevant Medications    metFORMIN (GLUCOPHAGE-XR) 500 MG XR 24hr tablet    Other Relevant Orders    Comprehensive metabolic panel    Lipid Panel    Hemoglobin A1C    Microalbumin/creatinine urine ratio    Hepatitis C Antibody    HIV 1/2 Ag/Ab (4th Gen)          ORDERS:   Orders Placed This Encounter    Comprehensive metabolic panel    Lipid Panel    Hemoglobin A1C    Microalbumin/creatinine urine ratio    Hepatitis C Antibody    HIV 1/2 Ag/Ab (4th Gen)    hydroCHLOROthiazide (HYDRODIURIL) 25 MG tablet    losartan (COZAAR) 50 MG tablet    metFORMIN (GLUCOPHAGE-XR) 500 MG XR 24hr tablet     HIV screening: discussed recommendations for HIV screening. Pt  agreeable  Vaccines recommended: up to date    Follow-up 3 months or sooner if any concerns.    Dr. Ebony Collins D.O.   [facility]

## 2020-07-28 ENCOUNTER — OFFICE VISIT (OUTPATIENT)
Dept: FAMILY MEDICINE | Facility: CLINIC | Age: 35
End: 2020-07-28
Payer: COMMERCIAL

## 2020-07-28 VITALS — SYSTOLIC BLOOD PRESSURE: 126 MMHG | DIASTOLIC BLOOD PRESSURE: 80 MMHG | HEIGHT: 71 IN | BODY MASS INDEX: 36.21 KG/M2

## 2020-07-28 DIAGNOSIS — I10 ESSENTIAL HYPERTENSION: ICD-10-CM

## 2020-07-28 DIAGNOSIS — E11.9 TYPE 2 DIABETES MELLITUS WITHOUT COMPLICATION, WITHOUT LONG-TERM CURRENT USE OF INSULIN: Primary | ICD-10-CM

## 2020-07-28 PROCEDURE — 3008F PR BODY MASS INDEX (BMI) DOCUMENTED: ICD-10-PCS | Mod: CPTII,,, | Performed by: FAMILY MEDICINE

## 2020-07-28 PROCEDURE — 3074F SYST BP LT 130 MM HG: CPT | Mod: CPTII,,, | Performed by: FAMILY MEDICINE

## 2020-07-28 PROCEDURE — 3074F PR MOST RECENT SYSTOLIC BLOOD PRESSURE < 130 MM HG: ICD-10-PCS | Mod: CPTII,,, | Performed by: FAMILY MEDICINE

## 2020-07-28 PROCEDURE — 3008F BODY MASS INDEX DOCD: CPT | Mod: CPTII,,, | Performed by: FAMILY MEDICINE

## 2020-07-28 PROCEDURE — 3044F HG A1C LEVEL LT 7.0%: CPT | Mod: CPTII,,, | Performed by: FAMILY MEDICINE

## 2020-07-28 PROCEDURE — 3044F PR MOST RECENT HEMOGLOBIN A1C LEVEL <7.0%: ICD-10-PCS | Mod: CPTII,,, | Performed by: FAMILY MEDICINE

## 2020-07-28 PROCEDURE — 3079F DIAST BP 80-89 MM HG: CPT | Mod: CPTII,,, | Performed by: FAMILY MEDICINE

## 2020-07-28 PROCEDURE — 99214 PR OFFICE/OUTPT VISIT, EST, LEVL IV, 30-39 MIN: ICD-10-PCS | Mod: 95,,, | Performed by: FAMILY MEDICINE

## 2020-07-28 PROCEDURE — 99214 OFFICE O/P EST MOD 30 MIN: CPT | Mod: 95,,, | Performed by: FAMILY MEDICINE

## 2020-07-28 PROCEDURE — 3079F PR MOST RECENT DIASTOLIC BLOOD PRESSURE 80-89 MM HG: ICD-10-PCS | Mod: CPTII,,, | Performed by: FAMILY MEDICINE

## 2020-07-28 RX ORDER — HYDROCHLOROTHIAZIDE 25 MG/1
25 TABLET ORAL DAILY
Qty: 90 TABLET | Refills: 0 | Status: SHIPPED | OUTPATIENT
Start: 2020-07-28 | End: 2021-01-22 | Stop reason: SDUPTHER

## 2020-07-28 RX ORDER — METFORMIN HYDROCHLORIDE 500 MG/1
500 TABLET, EXTENDED RELEASE ORAL DAILY
Qty: 90 TABLET | Refills: 0 | Status: SHIPPED | OUTPATIENT
Start: 2020-07-28 | End: 2020-10-28 | Stop reason: SDUPTHER

## 2020-07-28 RX ORDER — LOSARTAN POTASSIUM 50 MG/1
TABLET ORAL
Qty: 90 TABLET | Refills: 0 | Status: SHIPPED | OUTPATIENT
Start: 2020-07-28 | End: 2020-09-12 | Stop reason: SDUPTHER

## 2020-07-28 NOTE — ASSESSMENT & PLAN NOTE
Lab Results   Component Value Date    HGBA1C 6.5 (H) 10/24/2019     - recommend repeat A1C  - continue Metformin  mg daily at this time  - monitor glucose weekly  - due for eye exam

## 2020-08-18 ENCOUNTER — PATIENT MESSAGE (OUTPATIENT)
Dept: FAMILY MEDICINE | Facility: CLINIC | Age: 35
End: 2020-08-18

## 2020-08-19 RX ORDER — MUPIROCIN 20 MG/G
OINTMENT TOPICAL 3 TIMES DAILY
Qty: 30 G | Refills: 0 | Status: SHIPPED | OUTPATIENT
Start: 2020-08-19 | End: 2021-03-29

## 2020-09-12 DIAGNOSIS — I10 ESSENTIAL HYPERTENSION: ICD-10-CM

## 2020-09-14 RX ORDER — LOSARTAN POTASSIUM 50 MG/1
TABLET ORAL
Qty: 30 TABLET | Refills: 0 | Status: SHIPPED | OUTPATIENT
Start: 2020-09-14 | End: 2021-01-25 | Stop reason: SDUPTHER

## 2020-09-22 DIAGNOSIS — E11.9 TYPE 2 DIABETES MELLITUS WITHOUT COMPLICATION, WITHOUT LONG-TERM CURRENT USE OF INSULIN: ICD-10-CM

## 2020-09-22 RX ORDER — CALCIUM CITRATE/VITAMIN D3 200MG-6.25
TABLET ORAL
Qty: 100 STRIP | Refills: 11 | Status: SHIPPED | OUTPATIENT
Start: 2020-09-22 | End: 2023-11-10

## 2020-09-22 NOTE — TELEPHONE ENCOUNTER
Please remind patient that he needs to have his labs done at [facility]. He is overdue and I will not be able to refill medications without results  Dr. Ebony Collins D.O.   [facility]

## 2020-09-25 ENCOUNTER — PATIENT MESSAGE (OUTPATIENT)
Dept: OTHER | Facility: OTHER | Age: 35
End: 2020-09-25

## 2020-10-05 ENCOUNTER — PATIENT MESSAGE (OUTPATIENT)
Dept: ADMINISTRATIVE | Facility: HOSPITAL | Age: 35
End: 2020-10-05

## 2020-10-13 ENCOUNTER — PATIENT MESSAGE (OUTPATIENT)
Dept: FAMILY MEDICINE | Facility: CLINIC | Age: 35
End: 2020-10-13

## 2020-10-17 LAB
ALBUMIN/CREAT UR: 5 MCG/MG CREAT
CREAT UR-MCNC: 217 MG/DL (ref 20–320)
MICROALBUMIN UR-MCNC: 1 MG/DL

## 2020-10-19 LAB
ALBUMIN SERPL-MCNC: 3.8 G/DL (ref 3.6–5.1)
ALBUMIN/GLOB SERPL: 1.5 (CALC) (ref 1–2.5)
ALP SERPL-CCNC: 75 U/L (ref 36–130)
ALT SERPL-CCNC: 27 U/L (ref 9–46)
AST SERPL-CCNC: 13 U/L (ref 10–40)
BILIRUB SERPL-MCNC: 0.6 MG/DL (ref 0.2–1.2)
BUN SERPL-MCNC: 14 MG/DL (ref 7–25)
BUN/CREAT SERPL: ABNORMAL (CALC) (ref 6–22)
CALCIUM SERPL-MCNC: 8.8 MG/DL (ref 8.6–10.3)
CHLORIDE SERPL-SCNC: 103 MMOL/L (ref 98–110)
CHOLEST SERPL-MCNC: 179 MG/DL
CHOLEST/HDLC SERPL: 4.4 (CALC)
CO2 SERPL-SCNC: 29 MMOL/L (ref 20–32)
CREAT SERPL-MCNC: 0.68 MG/DL (ref 0.6–1.35)
GFRSERPLBLD MDRD-ARVRAT: 125 ML/MIN/1.73M2
GLOBULIN SER CALC-MCNC: 2.6 G/DL (CALC) (ref 1.9–3.7)
GLUCOSE SERPL-MCNC: 159 MG/DL (ref 65–99)
HBA1C MFR BLD: 7 % OF TOTAL HGB
HCV AB S/CO SERPL IA: 0.01
HCV AB SERPL QL IA: NORMAL
HDLC SERPL-MCNC: 41 MG/DL
HIV 1+2 AB+HIV1 P24 AG SERPL QL IA: NORMAL
LDLC SERPL CALC-MCNC: 116 MG/DL (CALC)
NONHDLC SERPL-MCNC: 138 MG/DL (CALC)
POTASSIUM SERPL-SCNC: 4.5 MMOL/L (ref 3.5–5.3)
PROT SERPL-MCNC: 6.4 G/DL (ref 6.1–8.1)
SODIUM SERPL-SCNC: 140 MMOL/L (ref 135–146)
TRIGL SERPL-MCNC: 108 MG/DL

## 2020-10-21 ENCOUNTER — PATIENT MESSAGE (OUTPATIENT)
Dept: FAMILY MEDICINE | Facility: CLINIC | Age: 35
End: 2020-10-21

## 2020-10-21 DIAGNOSIS — E11.9 TYPE 2 DIABETES MELLITUS WITHOUT COMPLICATION, WITHOUT LONG-TERM CURRENT USE OF INSULIN: Primary | ICD-10-CM

## 2020-10-28 ENCOUNTER — PATIENT MESSAGE (OUTPATIENT)
Dept: FAMILY MEDICINE | Facility: CLINIC | Age: 35
End: 2020-10-28

## 2020-10-28 ENCOUNTER — TELEPHONE (OUTPATIENT)
Dept: FAMILY MEDICINE | Facility: CLINIC | Age: 35
End: 2020-10-28

## 2020-10-28 DIAGNOSIS — E11.9 TYPE 2 DIABETES MELLITUS WITHOUT COMPLICATION, WITHOUT LONG-TERM CURRENT USE OF INSULIN: ICD-10-CM

## 2020-10-28 RX ORDER — METFORMIN HYDROCHLORIDE 500 MG/1
500 TABLET, EXTENDED RELEASE ORAL DAILY
Qty: 90 TABLET | Refills: 1 | Status: SHIPPED | OUTPATIENT
Start: 2020-10-28 | End: 2021-01-11 | Stop reason: SDUPTHER

## 2020-10-28 NOTE — TELEPHONE ENCOUNTER
RECALL INFORMATION  Please let patient know that only one  (there are several manufacturers for medications) was  affected and his pharmacy will change out his medication if it was affected. The medication itself has not been recalled. If they have not contacted him to change out the medication, He has not been affect and should continue to take the medication  Dr. Ebony Collins D.O.   [facility]

## 2020-10-28 NOTE — TELEPHONE ENCOUNTER
Spoke with patient and he states that his wife told him to call about his metformin XR. There was a recall on the metformin XR, patient is calling the pharmacy as well to see if his was recalled. Should the patient be worried about this medication?

## 2020-10-28 NOTE — TELEPHONE ENCOUNTER
Please have patient scheduled his 6 month follow-up and labs at [facility] [place] to be done 1 week prior to visit. Makayla Collins D.O.   [facility]

## 2020-10-28 NOTE — TELEPHONE ENCOUNTER
----- Message from Brittni Florence sent at 10/28/2020 11:28 AM CDT -----  Type:  Needs Medical Advice    Who Called: pt  Advice Regarding: has questions about Metformin - side effects  Would the patient rather a call back or a response via [facility]? call  Best Call Back Number:   Additional Information: n/a

## 2020-10-28 NOTE — TELEPHONE ENCOUNTER
10/28/020 left message on patient voice mail to call office back to be scheduled for 6 month follow and lab at [facility] to be done 1 week prior to visit. Per Dr. Dennis fernandez for virtual visit. Vf/ma

## 2021-01-04 ENCOUNTER — PATIENT MESSAGE (OUTPATIENT)
Dept: ADMINISTRATIVE | Facility: HOSPITAL | Age: 36
End: 2021-01-04

## 2021-01-10 ENCOUNTER — PATIENT MESSAGE (OUTPATIENT)
Dept: FAMILY MEDICINE | Facility: CLINIC | Age: 36
End: 2021-01-10

## 2021-01-10 ENCOUNTER — CLINICAL SUPPORT (OUTPATIENT)
Dept: URGENT CARE | Facility: CLINIC | Age: 36
End: 2021-01-10
Payer: COMMERCIAL

## 2021-01-10 VITALS — HEART RATE: 108 BPM | TEMPERATURE: 99 F | OXYGEN SATURATION: 95 %

## 2021-01-10 DIAGNOSIS — J02.9 SORE THROAT: Primary | ICD-10-CM

## 2021-01-10 DIAGNOSIS — E11.9 TYPE 2 DIABETES MELLITUS WITHOUT COMPLICATION, WITHOUT LONG-TERM CURRENT USE OF INSULIN: ICD-10-CM

## 2021-01-10 LAB
CTP QC/QA: YES
SARS-COV-2 RDRP RESP QL NAA+PROBE: NEGATIVE

## 2021-01-10 PROCEDURE — U0002 COVID-19 LAB TEST NON-CDC: HCPCS | Mod: QW,S$GLB,, | Performed by: NURSE PRACTITIONER

## 2021-01-10 PROCEDURE — U0002: ICD-10-PCS | Mod: QW,S$GLB,, | Performed by: NURSE PRACTITIONER

## 2021-01-11 RX ORDER — METFORMIN HYDROCHLORIDE 500 MG/1
500 TABLET, EXTENDED RELEASE ORAL 2 TIMES DAILY WITH MEALS
Qty: 180 TABLET | Refills: 0 | Status: SHIPPED | OUTPATIENT
Start: 2021-01-11 | End: 2021-03-29 | Stop reason: SDUPTHER

## 2021-01-11 RX ORDER — DULAGLUTIDE 1.5 MG/.5ML
1.5 INJECTION, SOLUTION SUBCUTANEOUS
Qty: 2 ML | Refills: 2 | Status: SHIPPED | OUTPATIENT
Start: 2021-01-11 | End: 2021-01-12 | Stop reason: SDUPTHER

## 2021-01-12 ENCOUNTER — PATIENT MESSAGE (OUTPATIENT)
Dept: FAMILY MEDICINE | Facility: CLINIC | Age: 36
End: 2021-01-12

## 2021-01-12 DIAGNOSIS — E11.9 TYPE 2 DIABETES MELLITUS WITHOUT COMPLICATION, WITHOUT LONG-TERM CURRENT USE OF INSULIN: ICD-10-CM

## 2021-01-12 RX ORDER — DULAGLUTIDE 1.5 MG/.5ML
1.5 INJECTION, SOLUTION SUBCUTANEOUS
Qty: 2 ML | Refills: 2 | Status: SHIPPED | OUTPATIENT
Start: 2021-01-12 | End: 2021-01-12

## 2021-01-12 RX ORDER — DULAGLUTIDE 1.5 MG/.5ML
1.5 INJECTION, SOLUTION SUBCUTANEOUS
Qty: 2 ML | Refills: 2 | Status: SHIPPED | OUTPATIENT
Start: 2021-01-12 | End: 2021-03-29 | Stop reason: SDUPTHER

## 2021-01-13 ENCOUNTER — TELEPHONE (OUTPATIENT)
Dept: FAMILY MEDICINE | Facility: CLINIC | Age: 36
End: 2021-01-13

## 2021-01-13 ENCOUNTER — PATIENT MESSAGE (OUTPATIENT)
Dept: FAMILY MEDICINE | Facility: CLINIC | Age: 36
End: 2021-01-13

## 2021-01-13 DIAGNOSIS — E11.9 TYPE 2 DIABETES MELLITUS WITHOUT COMPLICATION, UNSPECIFIED WHETHER LONG TERM INSULIN USE: ICD-10-CM

## 2021-01-22 ENCOUNTER — TELEPHONE (OUTPATIENT)
Dept: PHARMACY | Facility: CLINIC | Age: 36
End: 2021-01-22

## 2021-01-22 DIAGNOSIS — I10 ESSENTIAL HYPERTENSION: ICD-10-CM

## 2021-01-25 PROBLEM — E11.65 TYPE 2 DIABETES MELLITUS WITH HYPERGLYCEMIA, WITHOUT LONG-TERM CURRENT USE OF INSULIN: Status: ACTIVE | Noted: 2019-05-29

## 2021-01-25 RX ORDER — HYDROCHLOROTHIAZIDE 25 MG/1
25 TABLET ORAL DAILY
Qty: 90 TABLET | Refills: 3 | Status: SHIPPED | OUTPATIENT
Start: 2021-01-25 | End: 2021-05-30 | Stop reason: SDUPTHER

## 2021-01-25 RX ORDER — LOSARTAN POTASSIUM 50 MG/1
TABLET ORAL
Qty: 90 TABLET | Refills: 3 | Status: SHIPPED | OUTPATIENT
Start: 2021-01-25 | End: 2021-05-30 | Stop reason: SDUPTHER

## 2021-01-26 ENCOUNTER — TELEPHONE (OUTPATIENT)
Dept: FAMILY MEDICINE | Facility: CLINIC | Age: 36
End: 2021-01-26

## 2021-01-29 ENCOUNTER — TELEPHONE (OUTPATIENT)
Dept: PHARMACY | Facility: CLINIC | Age: 36
End: 2021-01-29

## 2021-02-18 ENCOUNTER — TELEPHONE (OUTPATIENT)
Dept: FAMILY MEDICINE | Facility: CLINIC | Age: 36
End: 2021-02-18

## 2021-03-03 ENCOUNTER — TELEPHONE (OUTPATIENT)
Dept: FAMILY MEDICINE | Facility: CLINIC | Age: 36
End: 2021-03-03

## 2021-03-03 DIAGNOSIS — E11.65 TYPE 2 DIABETES MELLITUS WITH HYPERGLYCEMIA, WITHOUT LONG-TERM CURRENT USE OF INSULIN: Primary | ICD-10-CM

## 2021-03-10 ENCOUNTER — PATIENT MESSAGE (OUTPATIENT)
Dept: FAMILY MEDICINE | Facility: CLINIC | Age: 36
End: 2021-03-10

## 2021-03-24 LAB
BUN SERPL-MCNC: 14 MG/DL (ref 7–25)
BUN/CREAT SERPL: ABNORMAL (CALC) (ref 6–22)
CALCIUM SERPL-MCNC: 9 MG/DL (ref 8.6–10.3)
CHLORIDE SERPL-SCNC: 104 MMOL/L (ref 98–110)
CO2 SERPL-SCNC: 28 MMOL/L (ref 20–32)
CREAT SERPL-MCNC: 0.65 MG/DL (ref 0.6–1.35)
GFRSERPLBLD MDRD-ARVRAT: 126 ML/MIN/1.73M2
GLUCOSE SERPL-MCNC: 129 MG/DL (ref 65–99)
HBA1C MFR BLD: 8.6 % OF TOTAL HGB
POTASSIUM SERPL-SCNC: 4.1 MMOL/L (ref 3.5–5.3)
SODIUM SERPL-SCNC: 140 MMOL/L (ref 135–146)

## 2021-03-29 ENCOUNTER — CLINICAL SUPPORT (OUTPATIENT)
Dept: FAMILY MEDICINE | Facility: CLINIC | Age: 36
End: 2021-03-29
Attending: FAMILY MEDICINE
Payer: COMMERCIAL

## 2021-03-29 ENCOUNTER — OFFICE VISIT (OUTPATIENT)
Dept: FAMILY MEDICINE | Facility: CLINIC | Age: 36
End: 2021-03-29
Payer: COMMERCIAL

## 2021-03-29 VITALS
BODY MASS INDEX: 44.1 KG/M2 | HEART RATE: 99 BPM | RESPIRATION RATE: 18 BRPM | SYSTOLIC BLOOD PRESSURE: 128 MMHG | WEIGHT: 315 LBS | DIASTOLIC BLOOD PRESSURE: 72 MMHG | HEIGHT: 71 IN | OXYGEN SATURATION: 99 % | TEMPERATURE: 99 F

## 2021-03-29 DIAGNOSIS — E11.9 TYPE 2 DIABETES MELLITUS WITHOUT COMPLICATION, WITHOUT LONG-TERM CURRENT USE OF INSULIN: ICD-10-CM

## 2021-03-29 DIAGNOSIS — I87.2 VENOUS INSUFFICIENCY OF BOTH LOWER EXTREMITIES: ICD-10-CM

## 2021-03-29 DIAGNOSIS — F17.210 TOBACCO DEPENDENCE DUE TO CIGARETTES: ICD-10-CM

## 2021-03-29 DIAGNOSIS — E11.65 TYPE 2 DIABETES MELLITUS WITH HYPERGLYCEMIA, WITHOUT LONG-TERM CURRENT USE OF INSULIN: ICD-10-CM

## 2021-03-29 DIAGNOSIS — E66.01 CLASS 3 SEVERE OBESITY DUE TO EXCESS CALORIES WITH SERIOUS COMORBIDITY AND BODY MASS INDEX (BMI) OF 50.0 TO 59.9 IN ADULT: ICD-10-CM

## 2021-03-29 DIAGNOSIS — Z00.01 ENCOUNTER FOR GENERAL ADULT MEDICAL EXAMINATION WITH ABNORMAL FINDINGS: Primary | ICD-10-CM

## 2021-03-29 DIAGNOSIS — I10 ESSENTIAL HYPERTENSION: ICD-10-CM

## 2021-03-29 PROCEDURE — 3008F BODY MASS INDEX DOCD: CPT | Mod: CPTII,S$GLB,, | Performed by: FAMILY MEDICINE

## 2021-03-29 PROCEDURE — 92228 DIABETIC EYE SCREENING PHOTO: ICD-10-PCS | Mod: 26,S$GLB,, | Performed by: OPHTHALMOLOGY

## 2021-03-29 PROCEDURE — 3052F HG A1C>EQUAL 8.0%<EQUAL 9.0%: CPT | Mod: CPTII,S$GLB,, | Performed by: FAMILY MEDICINE

## 2021-03-29 PROCEDURE — 3078F PR MOST RECENT DIASTOLIC BLOOD PRESSURE < 80 MM HG: ICD-10-PCS | Mod: CPTII,S$GLB,, | Performed by: FAMILY MEDICINE

## 2021-03-29 PROCEDURE — 99999 PR PBB SHADOW E&M-EST. PATIENT-LVL IV: ICD-10-PCS | Mod: PBBFAC,,, | Performed by: FAMILY MEDICINE

## 2021-03-29 PROCEDURE — 99999 PR PBB SHADOW E&M-EST. PATIENT-LVL IV: CPT | Mod: PBBFAC,,, | Performed by: FAMILY MEDICINE

## 2021-03-29 PROCEDURE — 1126F AMNT PAIN NOTED NONE PRSNT: CPT | Mod: S$GLB,,, | Performed by: FAMILY MEDICINE

## 2021-03-29 PROCEDURE — 92228 IMG RTA DETC/MNTR DS PHY/QHP: CPT | Mod: 26,S$GLB,, | Performed by: OPHTHALMOLOGY

## 2021-03-29 PROCEDURE — 1126F PR PAIN SEVERITY QUANTIFIED, NO PAIN PRESENT: ICD-10-PCS | Mod: S$GLB,,, | Performed by: FAMILY MEDICINE

## 2021-03-29 PROCEDURE — 3078F DIAST BP <80 MM HG: CPT | Mod: CPTII,S$GLB,, | Performed by: FAMILY MEDICINE

## 2021-03-29 PROCEDURE — 92228 IMG RTA DETC/MNTR DS PHY/QHP: CPT | Mod: TC,S$GLB,, | Performed by: FAMILY MEDICINE

## 2021-03-29 PROCEDURE — 92228 DIABETIC EYE SCREENING PHOTO: ICD-10-PCS | Mod: TC,S$GLB,, | Performed by: FAMILY MEDICINE

## 2021-03-29 PROCEDURE — 3074F SYST BP LT 130 MM HG: CPT | Mod: CPTII,S$GLB,, | Performed by: FAMILY MEDICINE

## 2021-03-29 PROCEDURE — 99395 PR PREVENTIVE VISIT,EST,18-39: ICD-10-PCS | Mod: S$GLB,,, | Performed by: FAMILY MEDICINE

## 2021-03-29 PROCEDURE — 3052F PR MOST RECENT HEMOGLOBIN A1C LEVEL 8.0 - < 9.0%: ICD-10-PCS | Mod: CPTII,S$GLB,, | Performed by: FAMILY MEDICINE

## 2021-03-29 PROCEDURE — 99395 PREV VISIT EST AGE 18-39: CPT | Mod: S$GLB,,, | Performed by: FAMILY MEDICINE

## 2021-03-29 PROCEDURE — 3008F PR BODY MASS INDEX (BMI) DOCUMENTED: ICD-10-PCS | Mod: CPTII,S$GLB,, | Performed by: FAMILY MEDICINE

## 2021-03-29 PROCEDURE — 3074F PR MOST RECENT SYSTOLIC BLOOD PRESSURE < 130 MM HG: ICD-10-PCS | Mod: CPTII,S$GLB,, | Performed by: FAMILY MEDICINE

## 2021-03-29 RX ORDER — METFORMIN HYDROCHLORIDE 500 MG/1
500 TABLET, EXTENDED RELEASE ORAL 2 TIMES DAILY WITH MEALS
Qty: 180 TABLET | Refills: 3 | Status: SHIPPED | OUTPATIENT
Start: 2021-03-29 | End: 2022-03-28

## 2021-03-29 RX ORDER — PRAVASTATIN SODIUM 10 MG/1
10 TABLET ORAL DAILY
Qty: 90 TABLET | Refills: 3 | Status: SHIPPED | OUTPATIENT
Start: 2021-03-29 | End: 2021-12-29 | Stop reason: SDUPTHER

## 2021-03-29 RX ORDER — DULAGLUTIDE 1.5 MG/.5ML
1.5 INJECTION, SOLUTION SUBCUTANEOUS
Qty: 2 ML | Refills: 3 | Status: SHIPPED | OUTPATIENT
Start: 2021-03-29 | End: 2021-08-08 | Stop reason: SDUPTHER

## 2021-04-01 ENCOUNTER — PATIENT MESSAGE (OUTPATIENT)
Dept: ADMINISTRATIVE | Facility: HOSPITAL | Age: 36
End: 2021-04-01

## 2021-04-16 ENCOUNTER — PATIENT MESSAGE (OUTPATIENT)
Dept: RESEARCH | Facility: HOSPITAL | Age: 36
End: 2021-04-16

## 2021-06-28 PROBLEM — Z00.01 ENCOUNTER FOR GENERAL ADULT MEDICAL EXAMINATION WITH ABNORMAL FINDINGS: Status: RESOLVED | Noted: 2021-03-29 | Resolved: 2021-06-28

## 2021-06-29 ENCOUNTER — TELEPHONE (OUTPATIENT)
Dept: FAMILY MEDICINE | Facility: CLINIC | Age: 36
End: 2021-06-29

## 2021-07-01 LAB
ALBUMIN SERPL-MCNC: 4 G/DL (ref 3.6–5.1)
ALBUMIN/GLOB SERPL: 1.7 (CALC) (ref 1–2.5)
ALP SERPL-CCNC: 66 U/L (ref 36–130)
ALT SERPL-CCNC: 20 U/L (ref 9–46)
AST SERPL-CCNC: 14 U/L (ref 10–40)
BILIRUB SERPL-MCNC: 0.7 MG/DL (ref 0.2–1.2)
BUN SERPL-MCNC: 10 MG/DL (ref 7–25)
BUN/CREAT SERPL: ABNORMAL (CALC) (ref 6–22)
CALCIUM SERPL-MCNC: 9 MG/DL (ref 8.6–10.3)
CHLORIDE SERPL-SCNC: 104 MMOL/L (ref 98–110)
CHOLEST SERPL-MCNC: 133 MG/DL
CHOLEST/HDLC SERPL: 3.4 (CALC)
CO2 SERPL-SCNC: 29 MMOL/L (ref 20–32)
CREAT SERPL-MCNC: 0.61 MG/DL (ref 0.6–1.35)
GLOBULIN SER CALC-MCNC: 2.4 G/DL (CALC) (ref 1.9–3.7)
GLUCOSE SERPL-MCNC: 110 MG/DL (ref 65–99)
HBA1C MFR BLD: 5.7 % OF TOTAL HGB
HDLC SERPL-MCNC: 39 MG/DL
LDLC SERPL CALC-MCNC: 75 MG/DL (CALC)
NONHDLC SERPL-MCNC: 94 MG/DL (CALC)
POTASSIUM SERPL-SCNC: 4.3 MMOL/L (ref 3.5–5.3)
PROT SERPL-MCNC: 6.4 G/DL (ref 6.1–8.1)
SODIUM SERPL-SCNC: 140 MMOL/L (ref 135–146)
TRIGL SERPL-MCNC: 111 MG/DL

## 2021-07-02 ENCOUNTER — PATIENT MESSAGE (OUTPATIENT)
Dept: FAMILY MEDICINE | Facility: CLINIC | Age: 36
End: 2021-07-02

## 2021-07-02 DIAGNOSIS — E11.65 TYPE 2 DIABETES MELLITUS WITH HYPERGLYCEMIA, WITHOUT LONG-TERM CURRENT USE OF INSULIN: Primary | ICD-10-CM

## 2021-07-07 ENCOUNTER — PATIENT MESSAGE (OUTPATIENT)
Dept: ADMINISTRATIVE | Facility: HOSPITAL | Age: 36
End: 2021-07-07

## 2021-08-08 DIAGNOSIS — E11.9 TYPE 2 DIABETES MELLITUS WITHOUT COMPLICATION, WITHOUT LONG-TERM CURRENT USE OF INSULIN: ICD-10-CM

## 2021-08-09 RX ORDER — DULAGLUTIDE 1.5 MG/.5ML
1.5 INJECTION, SOLUTION SUBCUTANEOUS
Qty: 4 PEN | Refills: 3 | Status: SHIPPED | OUTPATIENT
Start: 2021-08-09 | End: 2021-09-30 | Stop reason: SDUPTHER

## 2021-08-20 ENCOUNTER — PATIENT MESSAGE (OUTPATIENT)
Dept: FAMILY MEDICINE | Facility: CLINIC | Age: 36
End: 2021-08-20

## 2021-08-20 DIAGNOSIS — U07.1 COVID-19: Primary | ICD-10-CM

## 2021-08-21 ENCOUNTER — TELEPHONE (OUTPATIENT)
Dept: URGENT CARE | Facility: CLINIC | Age: 36
End: 2021-08-21

## 2021-08-21 ENCOUNTER — CLINICAL SUPPORT (OUTPATIENT)
Dept: URGENT CARE | Facility: CLINIC | Age: 36
End: 2021-08-21
Payer: COMMERCIAL

## 2021-08-21 DIAGNOSIS — U07.1 COVID-19: Primary | ICD-10-CM

## 2021-08-21 DIAGNOSIS — U07.1 COVID-19 VIRUS DETECTED: ICD-10-CM

## 2021-08-21 DIAGNOSIS — Z20.822 CONTACT WITH AND (SUSPECTED) EXPOSURE TO COVID-19: Primary | ICD-10-CM

## 2021-08-21 LAB
CTP QC/QA: YES
SARS-COV-2 RDRP RESP QL NAA+PROBE: POSITIVE

## 2021-08-21 PROCEDURE — 99211 PR OFFICE/OUTPT VISIT, EST, LEVL I: ICD-10-PCS | Mod: S$GLB,CS,, | Performed by: NURSE PRACTITIONER

## 2021-08-21 PROCEDURE — U0002: ICD-10-PCS | Mod: QW,S$GLB,, | Performed by: NURSE PRACTITIONER

## 2021-08-21 PROCEDURE — U0002 COVID-19 LAB TEST NON-CDC: HCPCS | Mod: QW,S$GLB,, | Performed by: NURSE PRACTITIONER

## 2021-08-21 PROCEDURE — 99211 OFF/OP EST MAY X REQ PHY/QHP: CPT | Mod: S$GLB,CS,, | Performed by: NURSE PRACTITIONER

## 2021-08-23 ENCOUNTER — TELEPHONE (OUTPATIENT)
Dept: FAMILY MEDICINE | Facility: CLINIC | Age: 36
End: 2021-08-23

## 2021-08-23 RX ORDER — ALBUTEROL SULFATE 90 UG/1
2 AEROSOL, METERED RESPIRATORY (INHALATION) EVERY 6 HOURS PRN
Qty: 18 G | Refills: 0 | Status: SHIPPED | OUTPATIENT
Start: 2021-08-23 | End: 2021-09-30

## 2021-08-23 RX ORDER — BENZONATATE 100 MG/1
100 CAPSULE ORAL 3 TIMES DAILY PRN
Qty: 30 CAPSULE | Refills: 0 | Status: SHIPPED | OUTPATIENT
Start: 2021-08-23 | End: 2021-09-02

## 2021-09-23 ENCOUNTER — PATIENT MESSAGE (OUTPATIENT)
Dept: FAMILY MEDICINE | Facility: CLINIC | Age: 36
End: 2021-09-23

## 2021-09-23 DIAGNOSIS — E11.65 TYPE 2 DIABETES MELLITUS WITH HYPERGLYCEMIA, WITHOUT LONG-TERM CURRENT USE OF INSULIN: Primary | ICD-10-CM

## 2021-09-28 ENCOUNTER — TELEPHONE (OUTPATIENT)
Dept: FAMILY MEDICINE | Facility: CLINIC | Age: 36
End: 2021-09-28

## 2021-09-28 LAB
BUN SERPL-MCNC: 9 MG/DL (ref 7–25)
BUN/CREAT SERPL: ABNORMAL (CALC) (ref 6–22)
CALCIUM SERPL-MCNC: 9.4 MG/DL (ref 8.6–10.3)
CHLORIDE SERPL-SCNC: 104 MMOL/L (ref 98–110)
CO2 SERPL-SCNC: 30 MMOL/L (ref 20–32)
CREAT SERPL-MCNC: 0.64 MG/DL (ref 0.6–1.35)
GLUCOSE SERPL-MCNC: 110 MG/DL (ref 65–99)
HBA1C MFR BLD: 5.5 % OF TOTAL HGB
POTASSIUM SERPL-SCNC: 4.5 MMOL/L (ref 3.5–5.3)
SODIUM SERPL-SCNC: 140 MMOL/L (ref 135–146)

## 2021-09-30 ENCOUNTER — PATIENT MESSAGE (OUTPATIENT)
Dept: FAMILY MEDICINE | Facility: CLINIC | Age: 36
End: 2021-09-30

## 2021-09-30 ENCOUNTER — OFFICE VISIT (OUTPATIENT)
Dept: FAMILY MEDICINE | Facility: CLINIC | Age: 36
End: 2021-09-30
Payer: COMMERCIAL

## 2021-09-30 ENCOUNTER — TELEPHONE (OUTPATIENT)
Dept: FAMILY MEDICINE | Facility: CLINIC | Age: 36
End: 2021-09-30

## 2021-09-30 VITALS — HEIGHT: 71 IN | BODY MASS INDEX: 57.74 KG/M2 | SYSTOLIC BLOOD PRESSURE: 122 MMHG | DIASTOLIC BLOOD PRESSURE: 70 MMHG

## 2021-09-30 DIAGNOSIS — I10 ESSENTIAL HYPERTENSION: Primary | ICD-10-CM

## 2021-09-30 DIAGNOSIS — F17.210 TOBACCO DEPENDENCE DUE TO CIGARETTES: ICD-10-CM

## 2021-09-30 DIAGNOSIS — E11.9 TYPE 2 DIABETES MELLITUS WITHOUT COMPLICATION, WITHOUT LONG-TERM CURRENT USE OF INSULIN: ICD-10-CM

## 2021-09-30 PROCEDURE — 1160F RVW MEDS BY RX/DR IN RCRD: CPT | Mod: CPTII,95,, | Performed by: FAMILY MEDICINE

## 2021-09-30 PROCEDURE — 1159F PR MEDICATION LIST DOCUMENTED IN MEDICAL RECORD: ICD-10-PCS | Mod: CPTII,95,, | Performed by: FAMILY MEDICINE

## 2021-09-30 PROCEDURE — 3078F PR MOST RECENT DIASTOLIC BLOOD PRESSURE < 80 MM HG: ICD-10-PCS | Mod: CPTII,95,, | Performed by: FAMILY MEDICINE

## 2021-09-30 PROCEDURE — 3078F DIAST BP <80 MM HG: CPT | Mod: CPTII,95,, | Performed by: FAMILY MEDICINE

## 2021-09-30 PROCEDURE — 3008F PR BODY MASS INDEX (BMI) DOCUMENTED: ICD-10-PCS | Mod: CPTII,95,, | Performed by: FAMILY MEDICINE

## 2021-09-30 PROCEDURE — 4010F ACE/ARB THERAPY RXD/TAKEN: CPT | Mod: CPTII,95,, | Performed by: FAMILY MEDICINE

## 2021-09-30 PROCEDURE — 3074F SYST BP LT 130 MM HG: CPT | Mod: CPTII,95,, | Performed by: FAMILY MEDICINE

## 2021-09-30 PROCEDURE — 1160F PR REVIEW ALL MEDS BY PRESCRIBER/CLIN PHARMACIST DOCUMENTED: ICD-10-PCS | Mod: CPTII,95,, | Performed by: FAMILY MEDICINE

## 2021-09-30 PROCEDURE — 4010F PR ACE/ARB THEARPY RXD/TAKEN: ICD-10-PCS | Mod: CPTII,95,, | Performed by: FAMILY MEDICINE

## 2021-09-30 PROCEDURE — 99214 OFFICE O/P EST MOD 30 MIN: CPT | Mod: 95,,, | Performed by: FAMILY MEDICINE

## 2021-09-30 PROCEDURE — 3008F BODY MASS INDEX DOCD: CPT | Mod: CPTII,95,, | Performed by: FAMILY MEDICINE

## 2021-09-30 PROCEDURE — 3044F HG A1C LEVEL LT 7.0%: CPT | Mod: CPTII,95,, | Performed by: FAMILY MEDICINE

## 2021-09-30 PROCEDURE — 1159F MED LIST DOCD IN RCRD: CPT | Mod: CPTII,95,, | Performed by: FAMILY MEDICINE

## 2021-09-30 PROCEDURE — 3074F PR MOST RECENT SYSTOLIC BLOOD PRESSURE < 130 MM HG: ICD-10-PCS | Mod: CPTII,95,, | Performed by: FAMILY MEDICINE

## 2021-09-30 PROCEDURE — 3044F PR MOST RECENT HEMOGLOBIN A1C LEVEL <7.0%: ICD-10-PCS | Mod: CPTII,95,, | Performed by: FAMILY MEDICINE

## 2021-09-30 PROCEDURE — 99214 PR OFFICE/OUTPT VISIT, EST, LEVL IV, 30-39 MIN: ICD-10-PCS | Mod: 95,,, | Performed by: FAMILY MEDICINE

## 2021-09-30 RX ORDER — DULAGLUTIDE 1.5 MG/.5ML
1.5 INJECTION, SOLUTION SUBCUTANEOUS
Qty: 4 PEN | Refills: 5 | Status: SHIPPED | OUTPATIENT
Start: 2021-09-30 | End: 2022-03-07 | Stop reason: SDUPTHER

## 2021-10-03 ENCOUNTER — PATIENT MESSAGE (OUTPATIENT)
Dept: FAMILY MEDICINE | Facility: CLINIC | Age: 36
End: 2021-10-03

## 2021-12-29 DIAGNOSIS — E11.65 TYPE 2 DIABETES MELLITUS WITH HYPERGLYCEMIA, WITHOUT LONG-TERM CURRENT USE OF INSULIN: ICD-10-CM

## 2021-12-30 RX ORDER — PRAVASTATIN SODIUM 10 MG/1
10 TABLET ORAL DAILY
Qty: 90 TABLET | Refills: 3 | Status: SHIPPED | OUTPATIENT
Start: 2021-12-30 | End: 2023-02-11 | Stop reason: SDUPTHER

## 2022-01-10 ENCOUNTER — PATIENT MESSAGE (OUTPATIENT)
Dept: ADMINISTRATIVE | Facility: HOSPITAL | Age: 37
End: 2022-01-10
Payer: COMMERCIAL

## 2022-02-07 ENCOUNTER — TELEPHONE (OUTPATIENT)
Dept: PHARMACY | Facility: CLINIC | Age: 37
End: 2022-02-07
Payer: COMMERCIAL

## 2022-02-16 DIAGNOSIS — E11.9 TYPE 2 DIABETES MELLITUS WITHOUT COMPLICATION: ICD-10-CM

## 2022-02-22 ENCOUNTER — PATIENT MESSAGE (OUTPATIENT)
Dept: RESEARCH | Facility: HOSPITAL | Age: 37
End: 2022-02-22
Payer: COMMERCIAL

## 2022-02-26 DIAGNOSIS — I10 ESSENTIAL HYPERTENSION: ICD-10-CM

## 2022-02-26 NOTE — TELEPHONE ENCOUNTER
No new care gaps identified.  Powered by [employer]. Reference number: [id number].   2/26/2022 5:28:48 AM CST

## 2022-03-03 RX ORDER — HYDROCHLOROTHIAZIDE 25 MG/1
TABLET ORAL
Qty: 90 TABLET | Refills: 1 | Status: SHIPPED | OUTPATIENT
Start: 2022-03-03 | End: 2022-03-28

## 2022-03-03 NOTE — TELEPHONE ENCOUNTER
Refill Authorization Note   Matthieu Son  is requesting a refill authorization.  Brief Assessment and Rationale for Refill:  Approve     Medication Therapy Plan:       Medication Reconciliation Completed: No   Comments:   --->Care Gap information included below if applicable.   Orders Placed This Encounter    hydroCHLOROthiazide (HYDRODIURIL) 25 MG tablet      Requested Prescriptions   Signed Prescriptions Disp Refills    hydroCHLOROthiazide (HYDRODIURIL) 25 MG tablet 90 tablet 1     Sig: TAKE 1 TABLET(25 MG) BY MOUTH EVERY DAY       Cardiovascular: Diuretics - Thiazide Passed - 3/3/2022  3:27 AM        Passed - Patient is at least 18 years old        Passed - Last BP in normal range within 360 days     BP Readings from Last 1 Encounters:   09/30/21 122/70               Passed - Valid encounter within last 15 months     Recent Visits  Date Type Provider Dept   09/30/21 Office Visit Ebony Collins, DO [facility]   03/29/21 Office Visit Ebony Collins, DO [facility]   07/28/20 Office Visit Ebony Collins, DO [facility]   Showing recent visits within past 720 days and meeting all other requirements  Future Appointments  No visits were found meeting these conditions.  Showing future appointments within next 150 days and meeting all other requirements      Future Appointments              In 3 weeks HOSPITAL LAB, [facility], [facility]    [facility]                Passed - Cr is 1.39 or below and within 360 days     Lab Results   Component Value Date    CREATININE 0.64 09/27/2021    CREATININE 0.61 06/30/2021    CREATININE 0.65 03/23/2021              Passed - K in normal range and within 360 days     Potassium   Date Value Ref Range Status   09/27/2021 4.5 3.5 - 5.3 mmol/L Final   06/30/2021 4.3 3.5 - 5.3 mmol/L Final   03/23/2021 4.1 3.5 - 5.3 mmol/L Final               Passed - Na is between 130 and 148 and within 360 days     Sodium   Date Value Ref Range Status   09/27/2021 140 135 - 146 mmol/L Final   06/30/2021 140 135 - 146 mmol/L Final   03/23/2021 140 135 - 146 mmol/L Final              Passed - eGFR within 360 days     Lab Results   Component Value Date    EGFRNONAA 127 09/27/2021    EGFRNONAA 129 06/30/2021    EGFRNONAA 126 03/23/2021                    Appointments  past 12m or future 3m with PCP    Date Provider   Last Visit   9/30/2021 Ebony Collins, DO   Next Visit   3/28/2022 Ebony Collins, DO   ED visits in past 90 days: 0     Note composed:3:28 AM 03/03/2022

## 2022-03-07 DIAGNOSIS — E11.9 TYPE 2 DIABETES MELLITUS WITHOUT COMPLICATION, WITHOUT LONG-TERM CURRENT USE OF INSULIN: ICD-10-CM

## 2022-03-07 RX ORDER — DULAGLUTIDE 1.5 MG/.5ML
1.5 INJECTION, SOLUTION SUBCUTANEOUS
Qty: 4 PEN | Refills: 5 | Status: SHIPPED | OUTPATIENT
Start: 2022-03-07 | End: 2022-09-20 | Stop reason: SDUPTHER

## 2022-03-07 NOTE — TELEPHONE ENCOUNTER
Care Due:                  Date            Visit Type   Department     Provider  --------------------------------------------------------------------------------                                ESTABLISHED                              [facility]  [facility]                               -                              [facility]  Next Visit: 03-      [facility]   [facility]                                                            Last  Test          Frequency    Reason                     Performed    Due Date  --------------------------------------------------------------------------------    HBA1C.......  6 months...  dulaglutide, metFORMIN...  09- 03-    Powered by [employer] by "[employer] Reference number: [id number].   3/07/2022 11:18:32 AM CST

## 2022-03-24 ENCOUNTER — TELEPHONE (OUTPATIENT)
Dept: FAMILY MEDICINE | Facility: CLINIC | Age: 37
End: 2022-03-24
Payer: COMMERCIAL

## 2022-03-24 DIAGNOSIS — Z00.01 ENCOUNTER FOR GENERAL ADULT MEDICAL EXAMINATION WITH ABNORMAL FINDINGS: Primary | ICD-10-CM

## 2022-03-24 DIAGNOSIS — E11.9 TYPE 2 DIABETES MELLITUS WITHOUT COMPLICATION, WITHOUT LONG-TERM CURRENT USE OF INSULIN: ICD-10-CM

## 2022-03-24 PROBLEM — E11.59 HYPERTENSION ASSOCIATED WITH TYPE 2 DIABETES MELLITUS: Status: ACTIVE | Noted: 2019-06-28

## 2022-03-24 PROBLEM — I15.2 HYPERTENSION ASSOCIATED WITH TYPE 2 DIABETES MELLITUS: Status: ACTIVE | Noted: 2019-06-28

## 2022-03-24 NOTE — TELEPHONE ENCOUNTER
Spoke with pt and pt wife, they stated it would be easier for pt to go to [facility] in [place] [address] please send to [employer]. Pt can go [facility].

## 2022-03-24 NOTE — TELEPHONE ENCOUNTER
----- Message from Tiffany Martin sent at 3/24/2022  9:28 AM CDT -----  Regarding: call back  Contact: [phone]  Type:  Patient Returning Call    Who Called: PT   Who Left Message for Patient: Nurse   Does the patient know what this is regarding?: Labs   Would the patient rather a call back or a response via [facility]? Call back   Best Call Back Number: [phone]  Additional Information:

## 2022-03-24 NOTE — PROGRESS NOTES
FAMILY MEDICINE  [facility]    Reason for visit:   Chief Complaint   Patient presents with    Follow-up       SUBJECTIVE: Matthieu Son is a 37 y.o. male  - smoker (started 18 years old 1 pack per day. 9/30/21 1/2 ppdday) with morbid obesity, venous insufficiency with history lower extremity venous stasis ulcer that have healed, hypertension, type 2 diabetes, and history of covid 19 (8/2021) presents for follow-up diabetes     He has been doing well and has discontinued Metformin since his last visit. He is working a lot recently but has been able to continue his weight loss and is pleased     1. Diabetes Type 2     Age diagnosed: 35 yo     Current treatment regimen:   dulaglutide (TRULICITY) 1.5 mg/0.5 mL pen injector, Inject 1.5 mg into the skin every 7 days.    Prior medication:  metFORMIN (GLUCOPHAGE-XR) 500 MG ER 24hr tablet, Take 1 tablet (500 mg total) by mouth 2 (two) times daily with meals  - discontinue 9/2021 visit since A1c had improved to 5.5%.  He opted to stay on Trulicity since have been helpful for his weight loss     Side effects from treatment: denies  Complications of diabetes: none     Glucometer: yes  Glucose monitoring: daily     Fasting glucose 100-120's mg/dL     Hemoglobin A1C       Date                     Value               Ref Range           Status                03/24/2022               5.5                 <5.7 % of tota*     Final              Comment:    For the purpose of screening for the presence of  diabetes:     <5.7%       Consistent with the absence of diabetes  5.7-6.4%    Consistent with increased risk for diabetes              (prediabetes)  > or =6.5%  Consistent with diabetes     This assay result is consistent with a decreased risk  of diabetes.     Currently, no consensus exists regarding use of  hemoglobin A1c for diagnosis of diabetes in children.     According to American Diabetes Association (ADA)  guidelines, hemoglobin A1c <7.0%  represents optimal  control in non-pregnant diabetic patients. Different  metrics may apply to specific patient populations.   Standards of Medical Care in Diabetes(ADA).             09/27/2021               5.5                 <5.7 % of tota*     Final              Comment:    For the purpose of screening for the presence of  diabetes:     <5.7%       Consistent with the absence of diabetes  5.7-6.4%    Consistent with increased risk for diabetes              (prediabetes)  > or =6.5%  Consistent with diabetes     This assay result is consistent with a decreased risk  of diabetes.     Currently, no consensus exists regarding use of  hemoglobin A1c for diagnosis of diabetes in children.     According to American Diabetes Association (ADA)  guidelines, hemoglobin A1c <7.0% represents optimal  control in non-pregnant diabetic patients. Different  metrics may apply to specific patient populations.   Standards of Medical Care in Diabetes(ADA).             06/30/2021               5.7 (H)             <5.7 % of tota*     Final              Comment:    For someone without known diabetes, a hemoglobin   A1c value between 5.7% and 6.4% is consistent with  prediabetes and should be confirmed with a   follow-up test.     For someone with known diabetes, a value <7%  indicates that their diabetes is well controlled. A1c  targets should be individualized based on duration of  diabetes, age, comorbid conditions, and other  considerations.     This assay result is consistent with an increased risk  of diabetes.     Currently, no consensus exists regarding use of  hemoglobin A1c for diagnosis of diabetes for children.       ----------    Low-dose statin:  Pravastatin  Last eye exam: 9/1/21  Last foot exam: 3/29/2021 due today 3/28/2022       Vaccines:   Influenza: recommended  Pneumovax: 23:  7/29/19  Prevnar 13: NA  Covid-19 booster due     2. Hypertension  - daibetes  - BP goal < 130/80    Current medication treatment:    hydroCHLOROthiazide (HYDRODIURIL) 25 MG tablet, Take 1 tablet (25 mg total) by mouth once daily., Disp: 90 tablet, Rfl: 3  losartan (COZAAR) 50 MG tablet, TAKE 1 TABLET(50 MG) BY MOUTH EVERY DAY, Disp: 90 tablet, Rfl: 3    Medication side effects: no medication side effects noted  taking medications as instructed, no medication side effects noted, no TIAs, no chest pain on exertion, no dyspnea on exertion, no swelling of ankles    Exercise regimen: not active    Home BP cuff: Yes  How often does patient monitoring BP? PRN          Wt Readings from Last 10 Encounters:  03/28/22 : (!) 175 kg (385 lb 12.9 oz)  03/29/21 : (!) 187.8 kg (414 lb)  11/27/19 : 117.8 kg (259 lb 9.6 oz)  10/28/19 : (!) 211.2 kg (465 lb 9.6 oz)  07/29/19 : (!) 221.4 kg (488 lb)  06/28/19 : (!) 224.8 kg (495 lb 8 oz)  05/28/19 : (!) 229.5 kg (506 lb)        Review of Systems   All other systems reviewed and are negative.        HISTORY:   Past Medical History:   Diagnosis Date    Diabetes mellitus, type 2     Hypertension     Venous stasis ulcer of calf limited to breakdown of skin with varicose veins 6/28/2019    Venous ulcers of both lower extremities 5/29/2019       Past Surgical History:   Procedure Laterality Date    WISDOM TOOTH EXTRACTION         Family History   Problem Relation Age of Onset    Diabetes Mother     Hypertension Mother     Breast cancer Mother     Hyperlipidemia Mother     Cancer Mother         breast cancer    Diabetes Father     Hypertension Father     Hyperlipidemia Father     Peripheral vascular disease Father     Heart attack Father     No Known Problems Sister     No Known Problems Son     Colon cancer Maternal Grandmother     Heart disease Maternal Grandfather     No Known Problems Paternal Grandmother     No Known Problems Paternal Grandfather        Social History     Tobacco Use    Smoking status: Current Every Day Smoker     Packs/day: 0.50     Types: Cigarettes     Start date: 2/9/2003  "   Smokeless tobacco: Never Used   Substance Use Topics    Alcohol use: Yes     Comment: social    Drug use: Never       Social History     Social History Narrative     to Kendra. 1 son (2021 4 years old).  and manager of [employer]. Occasional social alcohol use. Smoker since 19 yo about 1 ppd.  03/2021 reports that he decreased his smoking to half a pack a day.  Denies illicit drugs.       ALLERGIES:   Review of patient's allergies indicates:  No Known Allergies    MEDS:     Current Outpatient Medications:     aspirin (ECOTRIN) 81 MG EC tablet, Take 81 mg by mouth once daily., Disp: , Rfl:     dulaglutide (TRULICITY) 1.5 mg/0.5 mL pen injector, Inject 1.5 mg into the skin every 7 days., Disp: 4 pen, Rfl: 5    multivit-minerals/folic acid (ONE-A-DAY MEN VITACRAVES ORAL), Take 1 tablet by mouth once daily., Disp: , Rfl:     pravastatin (PRAVACHOL) 10 MG tablet, Take 1 tablet (10 mg total) by mouth once daily., Disp: 90 tablet, Rfl: 3    blood-glucose meter kit, Use as instructed, Disp: 1 each, Rfl: 0    hydroCHLOROthiazide (HYDRODIURIL) 25 MG tablet, Take 1 tablet (25 mg total) by mouth once daily., Disp: 90 tablet, Rfl: 3    lancets Misc, 1 lancet by Misc.(Non-Drug; Combo Route) route once daily., Disp: 100 each, Rfl: 3    losartan (COZAAR) 50 MG tablet, TAKE 1 TABLET(50 MG) BY MOUTH EVERY DAY, Disp: 90 tablet, Rfl: 3    TRUE METRIX GLUCOSE METER Misc, USE UTD, Disp: , Rfl: 0    TRUE METRIX GLUCOSE TEST STRIP Strp, CHECK BLOOD SUGAR EVERY DAY, Disp: 100 strip, Rfl: 11    TRUEPLUS LANCETS 33 gauge Misc, CHECK BLOOD SUGAR QD, Disp: , Rfl: 3    Vital signs:   Vitals:    03/28/22 0818   BP: 128/72   BP Location: Left arm   Patient Position: Sitting   BP Method: Large (Manual)   Pulse: 73   SpO2: 97%   Weight: (!) 175 kg (385 lb 12.9 oz)   Height: 5' 11" (1.803 m)     Body mass index is 53.81 kg/m².    PHYSICAL EXAM:     Physical Exam  Constitutional:       General: He is not in acute " distress.  Cardiovascular:      Rate and Rhythm: Normal rate and regular rhythm.      Pulses: Normal pulses.           Dorsalis pedis pulses are 2+ on the right side and 2+ on the left side.        Posterior tibial pulses are 2+ on the right side and 2+ on the left side.      Heart sounds: Normal heart sounds. No murmur heard.    No friction rub. No gallop.   Pulmonary:      Effort: Pulmonary effort is normal.      Breath sounds: Normal breath sounds. No wheezing, rhonchi or rales.   Musculoskeletal:      Cervical back: Neck supple.      Right lower leg: No edema.      Left lower leg: No edema.   Feet:      Right foot:      Protective Sensation: 5 sites tested. 5 sites sensed.      Skin integrity: Skin integrity normal.      Left foot:      Protective Sensation: 5 sites tested. 5 sites sensed.      Skin integrity: Skin integrity normal.   Skin:     General: Skin is warm.   Neurological:      Mental Status: He is alert.           PHQ4 = No data recorded    PERTINENT RESULTS:   Orders Only on 03/24/2022   Component Date Value Ref Range Status    Creatinine, Urine 03/25/2022 180  20 - 320 mg/dL Final    Microalb, Ur 03/25/2022 0.7  See Note: mg/dL Final    Comment: Reference Range:    Reference Range  Not established      Microalb/Creat Ratio 03/25/2022 4  <30 mcg/mg creat Final    Comment:    The ADA defines abnormalities in albumin  excretion as follows:     Albuminuria Category        Result (mcg/mg creatinine)     Normal to Mildly increased   <30  Moderately increased            Severely increased           > OR = 300     The ADA recommends that at least two of three  specimens collected within a 3-6 month period be  abnormal before considering a patient to be  within a diagnostic category.      Cholesterol 03/25/2022 149  <200 mg/dL Final    HDL 03/25/2022 50  > OR = 40 mg/dL Final    Triglycerides 03/25/2022 62  <150 mg/dL Final    LDL Cholesterol 03/25/2022 85  mg/dL (calc) Final    Comment:  Reference range: <100     Desirable range <100 mg/dL for primary prevention;    <70 mg/dL for patients with CHD or diabetic patients   with > or = 2 CHD risk factors.     LDL-C is now calculated using the Marilyn   calculation, which is a validated novel method providing   better accuracy than the Friedewald equation in the   estimation of LDL-C.   Erik ACOSTA et al. ARLENE. 2013;310(19): 8245-5105   (http://education.[employer].cartmi/faq/BMN802)      HDL/Cholesterol Ratio 03/25/2022 3.0  <5.0 (calc) Final    Non HDL Chol. (LDL+VLDL) 03/25/2022 99  <130 mg/dL (calc) Final    Comment: For patients with diabetes plus 1 major ASCVD risk   factor, treating to a non-HDL-C goal of <100 mg/dL   (LDL-C of <70 mg/dL) is considered a therapeutic   option.      WBC 03/25/2022 7.9  3.8 - 10.8 Thousand/uL Final    RBC 03/25/2022 5.38  4.20 - 5.80 Million/uL Final    Hemoglobin 03/25/2022 15.1  13.2 - 17.1 g/dL Final    Hematocrit 03/25/2022 47.9  38.5 - 50.0 % Final    MCV 03/25/2022 89.0  80.0 - 100.0 fL Final    MCH 03/25/2022 28.1  27.0 - 33.0 pg Final    MCHC 03/25/2022 31.5 (A) 32.0 - 36.0 g/dL Final    RDW 03/25/2022 12.3  11.0 - 15.0 % Final    Platelets 03/25/2022 242  140 - 400 Thousand/uL Final    MPV 03/25/2022 10.6  7.5 - 12.5 fL Final    Glucose 03/25/2022 125 (A) 65 - 99 mg/dL Final    Comment:               Fasting reference interval     For someone without known diabetes, a glucose value  between 100 and 125 mg/dL is consistent with  prediabetes and should be confirmed with a  follow-up test.         BUN 03/25/2022 23  7 - 25 mg/dL Final    Creatinine 03/25/2022 0.78  0.60 - 1.35 mg/dL Final    eGFR if non  03/25/2022 116  > OR = 60 mL/min/1.73m2 Final    eGFR if African American 03/25/2022 135  > OR = 60 mL/min/1.73m2 Final    BUN/Creatinine Ratio 03/25/2022 NOT APPLICABLE  6 - 22 (calc) Final    Sodium 03/25/2022 142  135 - 146 mmol/L Final    Potassium 03/25/2022 5.3   3.5 - 5.3 mmol/L Final    Chloride 03/25/2022 107  98 - 110 mmol/L Final    CO2 03/25/2022 29  20 - 32 mmol/L Final    Calcium 03/25/2022 9.1  8.6 - 10.3 mg/dL Final    Total Protein 03/25/2022 6.5  6.1 - 8.1 g/dL Final    Albumin 03/25/2022 3.9  3.6 - 5.1 g/dL Final    Globulin, Total 03/25/2022 2.6  1.9 - 3.7 g/dL (calc) Final    Albumin/Globulin Ratio 03/25/2022 1.5  1.0 - 2.5 (calc) Final    Total Bilirubin 03/25/2022 0.7  0.2 - 1.2 mg/dL Final    Alkaline Phosphatase 03/25/2022 62  36 - 130 U/L Final    AST 03/25/2022 11  10 - 40 U/L Final    ALT 03/25/2022 16  9 - 46 U/L Final       ASSESSMENT/PLAN:  Problem List Items Addressed This Visit        Endocrine    Type 2 diabetes mellitus without complication, without long-term current use of insulin - Primary    Overview     Lab Results   Component Value Date    HGBA1C 5.5 03/24/2022     - A1C still in normal range since 9/27/21  - Great job!  - he opts to remain on Trulicity           Relevant Orders    Comprehensive Metabolic Panel    Hemoglobin A1C     DIABETES FOOT EXAM (Completed)       Other    Class 3 severe obesity due to excess calories with serious comorbidity and body mass index (BMI) of 50.0 to 59.9 in adult    Overview     - great job with weight loss  - continue trulicity           Hypertension associated with type 2 diabetes mellitus    Overview     - well controlled  - continue current medications           Relevant Medications    losartan (COZAAR) 50 MG tablet    hydroCHLOROthiazide (HYDRODIURIL) 25 MG tablet    Tobacco dependence due to cigarettes    Overview     - started 18 years old  - 1 pack per day  - 03/2021 reduce to half a pack a day and still at 1/2 ppd or less  - recommend quit smoking  - declined smoking cessation program at this time                 ORDERS:   Orders Placed This Encounter    Comprehensive Metabolic Panel    Hemoglobin A1C     DIABETES FOOT EXAM    losartan (COZAAR) 50 MG tablet     hydroCHLOROthiazide (HYDRODIURIL) 25 MG tablet       Vaccines recommended: covid-19     Follow-up in 6 months with labs 1 week prior [facility] or sooner if any concerns.  This note is dictated using the M*Modal Fluency Direct word recognition program. There are word recognition mistakes that are occasionally missed on review.    Dr. Ebony Collins D.O.   [facility]

## 2022-03-24 NOTE — TELEPHONE ENCOUNTER
----- Message from Ebony Collins DO sent at 3/24/2022  7:50 AM CDT -----  Regarding: Labs scheduled 03/25/2022  Please call remind patient that he has labs scheduled 03/25/2022 at [facility].  Usually has labs done at [facility].  Please ask if he still wants to do them here or an [facility] lab closer to his house ([facility]) or [facility].     Dr. Ebony Collins D.O.   [facility]

## 2022-03-25 LAB — HBA1C MFR BLD: 5.5 % OF TOTAL HGB

## 2022-03-26 LAB
ALBUMIN SERPL-MCNC: 3.9 G/DL (ref 3.6–5.1)
ALBUMIN/CREAT UR: 4 MCG/MG CREAT
ALBUMIN/GLOB SERPL: 1.5 (CALC) (ref 1–2.5)
ALP SERPL-CCNC: 62 U/L (ref 36–130)
ALT SERPL-CCNC: 16 U/L (ref 9–46)
AST SERPL-CCNC: 11 U/L (ref 10–40)
BILIRUB SERPL-MCNC: 0.7 MG/DL (ref 0.2–1.2)
BUN SERPL-MCNC: 23 MG/DL (ref 7–25)
BUN/CREAT SERPL: ABNORMAL (CALC) (ref 6–22)
CALCIUM SERPL-MCNC: 9.1 MG/DL (ref 8.6–10.3)
CHLORIDE SERPL-SCNC: 107 MMOL/L (ref 98–110)
CHOLEST SERPL-MCNC: 149 MG/DL
CHOLEST/HDLC SERPL: 3 (CALC)
CO2 SERPL-SCNC: 29 MMOL/L (ref 20–32)
CREAT SERPL-MCNC: 0.78 MG/DL (ref 0.6–1.35)
CREAT UR-MCNC: 180 MG/DL (ref 20–320)
ERYTHROCYTE [DISTWIDTH] IN BLOOD BY AUTOMATED COUNT: 12.3 % (ref 11–15)
GLOBULIN SER CALC-MCNC: 2.6 G/DL (CALC) (ref 1.9–3.7)
GLUCOSE SERPL-MCNC: 125 MG/DL (ref 65–99)
HCT VFR BLD AUTO: 47.9 % (ref 38.5–50)
HDLC SERPL-MCNC: 50 MG/DL
HGB BLD-MCNC: 15.1 G/DL (ref 13.2–17.1)
LDLC SERPL CALC-MCNC: 85 MG/DL (CALC)
MCH RBC QN AUTO: 28.1 PG (ref 27–33)
MCHC RBC AUTO-ENTMCNC: 31.5 G/DL (ref 32–36)
MCV RBC AUTO: 89 FL (ref 80–100)
MICROALBUMIN UR-MCNC: 0.7 MG/DL
NONHDLC SERPL-MCNC: 99 MG/DL (CALC)
PLATELET # BLD AUTO: 242 THOUSAND/UL (ref 140–400)
PMV BLD REES-ECKER: 10.6 FL (ref 7.5–12.5)
POTASSIUM SERPL-SCNC: 5.3 MMOL/L (ref 3.5–5.3)
PROT SERPL-MCNC: 6.5 G/DL (ref 6.1–8.1)
RBC # BLD AUTO: 5.38 MILLION/UL (ref 4.2–5.8)
SODIUM SERPL-SCNC: 142 MMOL/L (ref 135–146)
TRIGL SERPL-MCNC: 62 MG/DL
WBC # BLD AUTO: 7.9 THOUSAND/UL (ref 3.8–10.8)

## 2022-03-28 ENCOUNTER — OFFICE VISIT (OUTPATIENT)
Dept: FAMILY MEDICINE | Facility: CLINIC | Age: 37
End: 2022-03-28
Payer: COMMERCIAL

## 2022-03-28 VITALS
BODY MASS INDEX: 44.1 KG/M2 | WEIGHT: 315 LBS | OXYGEN SATURATION: 97 % | HEIGHT: 71 IN | HEART RATE: 73 BPM | DIASTOLIC BLOOD PRESSURE: 72 MMHG | SYSTOLIC BLOOD PRESSURE: 128 MMHG

## 2022-03-28 DIAGNOSIS — F17.210 TOBACCO DEPENDENCE DUE TO CIGARETTES: ICD-10-CM

## 2022-03-28 DIAGNOSIS — E11.9 TYPE 2 DIABETES MELLITUS WITHOUT COMPLICATION, WITHOUT LONG-TERM CURRENT USE OF INSULIN: Primary | ICD-10-CM

## 2022-03-28 DIAGNOSIS — E11.59 HYPERTENSION ASSOCIATED WITH TYPE 2 DIABETES MELLITUS: ICD-10-CM

## 2022-03-28 DIAGNOSIS — E66.01 CLASS 3 SEVERE OBESITY DUE TO EXCESS CALORIES WITH SERIOUS COMORBIDITY AND BODY MASS INDEX (BMI) OF 50.0 TO 59.9 IN ADULT: ICD-10-CM

## 2022-03-28 DIAGNOSIS — I15.2 HYPERTENSION ASSOCIATED WITH TYPE 2 DIABETES MELLITUS: ICD-10-CM

## 2022-03-28 PROCEDURE — 3008F BODY MASS INDEX DOCD: CPT | Mod: CPTII,S$GLB,, | Performed by: FAMILY MEDICINE

## 2022-03-28 PROCEDURE — 3066F NEPHROPATHY DOC TX: CPT | Mod: CPTII,S$GLB,, | Performed by: FAMILY MEDICINE

## 2022-03-28 PROCEDURE — 3044F PR MOST RECENT HEMOGLOBIN A1C LEVEL <7.0%: ICD-10-PCS | Mod: CPTII,S$GLB,, | Performed by: FAMILY MEDICINE

## 2022-03-28 PROCEDURE — 99999 PR PBB SHADOW E&M-EST. PATIENT-LVL III: ICD-10-PCS | Mod: PBBFAC,,, | Performed by: FAMILY MEDICINE

## 2022-03-28 PROCEDURE — 3061F PR NEG MICROALBUMINURIA RESULT DOCUMENTED/REVIEW: ICD-10-PCS | Mod: CPTII,S$GLB,, | Performed by: FAMILY MEDICINE

## 2022-03-28 PROCEDURE — 3078F DIAST BP <80 MM HG: CPT | Mod: CPTII,S$GLB,, | Performed by: FAMILY MEDICINE

## 2022-03-28 PROCEDURE — 3066F PR DOCUMENTATION OF TREATMENT FOR NEPHROPATHY: ICD-10-PCS | Mod: CPTII,S$GLB,, | Performed by: FAMILY MEDICINE

## 2022-03-28 PROCEDURE — 99214 OFFICE O/P EST MOD 30 MIN: CPT | Mod: S$GLB,,, | Performed by: FAMILY MEDICINE

## 2022-03-28 PROCEDURE — 4010F ACE/ARB THERAPY RXD/TAKEN: CPT | Mod: CPTII,S$GLB,, | Performed by: FAMILY MEDICINE

## 2022-03-28 PROCEDURE — 99214 PR OFFICE/OUTPT VISIT, EST, LEVL IV, 30-39 MIN: ICD-10-PCS | Mod: S$GLB,,, | Performed by: FAMILY MEDICINE

## 2022-03-28 PROCEDURE — 3008F PR BODY MASS INDEX (BMI) DOCUMENTED: ICD-10-PCS | Mod: CPTII,S$GLB,, | Performed by: FAMILY MEDICINE

## 2022-03-28 PROCEDURE — 3074F PR MOST RECENT SYSTOLIC BLOOD PRESSURE < 130 MM HG: ICD-10-PCS | Mod: CPTII,S$GLB,, | Performed by: FAMILY MEDICINE

## 2022-03-28 PROCEDURE — 3078F PR MOST RECENT DIASTOLIC BLOOD PRESSURE < 80 MM HG: ICD-10-PCS | Mod: CPTII,S$GLB,, | Performed by: FAMILY MEDICINE

## 2022-03-28 PROCEDURE — 3074F SYST BP LT 130 MM HG: CPT | Mod: CPTII,S$GLB,, | Performed by: FAMILY MEDICINE

## 2022-03-28 PROCEDURE — 3044F HG A1C LEVEL LT 7.0%: CPT | Mod: CPTII,S$GLB,, | Performed by: FAMILY MEDICINE

## 2022-03-28 PROCEDURE — 99999 PR PBB SHADOW E&M-EST. PATIENT-LVL III: CPT | Mod: PBBFAC,,, | Performed by: FAMILY MEDICINE

## 2022-03-28 PROCEDURE — 3061F NEG MICROALBUMINURIA REV: CPT | Mod: CPTII,S$GLB,, | Performed by: FAMILY MEDICINE

## 2022-03-28 PROCEDURE — 4010F PR ACE/ARB THEARPY RXD/TAKEN: ICD-10-PCS | Mod: CPTII,S$GLB,, | Performed by: FAMILY MEDICINE

## 2022-03-28 RX ORDER — LOSARTAN POTASSIUM 50 MG/1
TABLET ORAL
Qty: 90 TABLET | Refills: 3 | Status: SHIPPED | OUTPATIENT
Start: 2022-03-28 | End: 2023-03-30 | Stop reason: SDUPTHER

## 2022-03-28 RX ORDER — HYDROCHLOROTHIAZIDE 25 MG/1
25 TABLET ORAL DAILY
Qty: 90 TABLET | Refills: 3 | Status: SHIPPED | OUTPATIENT
Start: 2022-03-28 | End: 2023-04-11 | Stop reason: SDUPTHER

## 2022-08-13 ENCOUNTER — OFFICE VISIT (OUTPATIENT)
Dept: URGENT CARE | Facility: CLINIC | Age: 37
End: 2022-08-13
Payer: COMMERCIAL

## 2022-08-13 VITALS
RESPIRATION RATE: 18 BRPM | SYSTOLIC BLOOD PRESSURE: 139 MMHG | HEIGHT: 71 IN | OXYGEN SATURATION: 98 % | TEMPERATURE: 98 F | HEART RATE: 84 BPM | DIASTOLIC BLOOD PRESSURE: 83 MMHG | WEIGHT: 315 LBS | BODY MASS INDEX: 44.1 KG/M2

## 2022-08-13 DIAGNOSIS — F17.200 SMOKER: ICD-10-CM

## 2022-08-13 DIAGNOSIS — J02.9 PHARYNGITIS, UNSPECIFIED ETIOLOGY: ICD-10-CM

## 2022-08-13 DIAGNOSIS — H66.91 RIGHT ACUTE OTITIS MEDIA: Primary | ICD-10-CM

## 2022-08-13 LAB
CTP QC/QA: YES
SARS-COV-2 RDRP RESP QL NAA+PROBE: NEGATIVE

## 2022-08-13 PROCEDURE — 3066F PR DOCUMENTATION OF TREATMENT FOR NEPHROPATHY: ICD-10-PCS | Mod: CPTII,S$GLB,, | Performed by: NURSE PRACTITIONER

## 2022-08-13 PROCEDURE — 1159F MED LIST DOCD IN RCRD: CPT | Mod: CPTII,S$GLB,, | Performed by: NURSE PRACTITIONER

## 2022-08-13 PROCEDURE — 99213 PR OFFICE/OUTPT VISIT, EST, LEVL III, 20-29 MIN: ICD-10-PCS | Mod: S$GLB,,, | Performed by: NURSE PRACTITIONER

## 2022-08-13 PROCEDURE — 1159F PR MEDICATION LIST DOCUMENTED IN MEDICAL RECORD: ICD-10-PCS | Mod: CPTII,S$GLB,, | Performed by: NURSE PRACTITIONER

## 2022-08-13 PROCEDURE — 3075F PR MOST RECENT SYSTOLIC BLOOD PRESS GE 130-139MM HG: ICD-10-PCS | Mod: CPTII,S$GLB,, | Performed by: NURSE PRACTITIONER

## 2022-08-13 PROCEDURE — 3079F DIAST BP 80-89 MM HG: CPT | Mod: CPTII,S$GLB,, | Performed by: NURSE PRACTITIONER

## 2022-08-13 PROCEDURE — U0002 COVID-19 LAB TEST NON-CDC: HCPCS | Mod: QW,S$GLB,, | Performed by: NURSE PRACTITIONER

## 2022-08-13 PROCEDURE — 3008F BODY MASS INDEX DOCD: CPT | Mod: CPTII,S$GLB,, | Performed by: NURSE PRACTITIONER

## 2022-08-13 PROCEDURE — 3008F PR BODY MASS INDEX (BMI) DOCUMENTED: ICD-10-PCS | Mod: CPTII,S$GLB,, | Performed by: NURSE PRACTITIONER

## 2022-08-13 PROCEDURE — 3061F PR NEG MICROALBUMINURIA RESULT DOCUMENTED/REVIEW: ICD-10-PCS | Mod: CPTII,S$GLB,, | Performed by: NURSE PRACTITIONER

## 2022-08-13 PROCEDURE — 1160F PR REVIEW ALL MEDS BY PRESCRIBER/CLIN PHARMACIST DOCUMENTED: ICD-10-PCS | Mod: CPTII,S$GLB,, | Performed by: NURSE PRACTITIONER

## 2022-08-13 PROCEDURE — U0002: ICD-10-PCS | Mod: QW,S$GLB,, | Performed by: NURSE PRACTITIONER

## 2022-08-13 PROCEDURE — 3066F NEPHROPATHY DOC TX: CPT | Mod: CPTII,S$GLB,, | Performed by: NURSE PRACTITIONER

## 2022-08-13 PROCEDURE — 3061F NEG MICROALBUMINURIA REV: CPT | Mod: CPTII,S$GLB,, | Performed by: NURSE PRACTITIONER

## 2022-08-13 PROCEDURE — 1160F RVW MEDS BY RX/DR IN RCRD: CPT | Mod: CPTII,S$GLB,, | Performed by: NURSE PRACTITIONER

## 2022-08-13 PROCEDURE — 3044F PR MOST RECENT HEMOGLOBIN A1C LEVEL <7.0%: ICD-10-PCS | Mod: CPTII,S$GLB,, | Performed by: NURSE PRACTITIONER

## 2022-08-13 PROCEDURE — 3079F PR MOST RECENT DIASTOLIC BLOOD PRESSURE 80-89 MM HG: ICD-10-PCS | Mod: CPTII,S$GLB,, | Performed by: NURSE PRACTITIONER

## 2022-08-13 PROCEDURE — 4010F PR ACE/ARB THEARPY RXD/TAKEN: ICD-10-PCS | Mod: CPTII,S$GLB,, | Performed by: NURSE PRACTITIONER

## 2022-08-13 PROCEDURE — 3075F SYST BP GE 130 - 139MM HG: CPT | Mod: CPTII,S$GLB,, | Performed by: NURSE PRACTITIONER

## 2022-08-13 PROCEDURE — 4010F ACE/ARB THERAPY RXD/TAKEN: CPT | Mod: CPTII,S$GLB,, | Performed by: NURSE PRACTITIONER

## 2022-08-13 PROCEDURE — 3044F HG A1C LEVEL LT 7.0%: CPT | Mod: CPTII,S$GLB,, | Performed by: NURSE PRACTITIONER

## 2022-08-13 PROCEDURE — 99213 OFFICE O/P EST LOW 20 MIN: CPT | Mod: S$GLB,,, | Performed by: NURSE PRACTITIONER

## 2022-08-13 RX ORDER — AMOXICILLIN AND CLAVULANATE POTASSIUM 875; 125 MG/1; MG/1
1 TABLET, FILM COATED ORAL EVERY 12 HOURS
Qty: 20 TABLET | Refills: 0 | Status: SHIPPED | OUTPATIENT
Start: 2022-08-13 | End: 2022-08-23

## 2022-08-13 NOTE — PROGRESS NOTES
"Subjective:       Patient ID: Matthieu Son is a 37 y.o. male.    Vitals:  height is 5' 11" (1.803 m) and weight is 174.6 kg (385 lb) (abnormal). His oral temperature is 97.8 °F (36.6 °C). His blood pressure is 139/83 and his pulse is 84. His respiration is 18 and oxygen saturation is 98%.     Chief Complaint: Sore Throat (earache)    This is a 37 y.o. male who presents today with a chief complaint of sore throat and earache x3 days. Took a cold med with some relief. He denies breathing trouble, cough, SOB, and wheeze. Denies nausea, vomiting, and diarrhea. Hurts to swallow.     Sore Throat   This is a new problem. The current episode started in the past 7 days. The problem has been waxing and waning. There has been no fever. The pain is at a severity of 5/10. The pain is mild. Associated symptoms include congestion, coughing, ear pain and trouble swallowing. Pertinent negatives include no headaches or hoarse voice. He has tried nothing for the symptoms. The treatment provided no relief.       HENT: Positive for ear pain, congestion, sore throat and trouble swallowing.    Respiratory: Positive for cough.    Neurological: Negative for headaches.       Objective:      Physical Exam   Constitutional: He is oriented to person, place, and time.  Non-toxic appearance. He does not appear ill. No distress.   HENT:   Head: Normocephalic.   Ears:   Right Ear: External ear normal. Tympanic membrane is erythematous and bulging.   Left Ear: Tympanic membrane, external ear and ear canal normal.   Nose: Nose normal.   Mouth/Throat: Uvula is midline and mucous membranes are normal. Mucous membranes are moist. No uvula swelling. Posterior oropharyngeal edema and posterior oropharyngeal erythema present. No oropharyngeal exudate or tonsillar abscesses. Tonsils are 3+ on the right. Tonsils are 3+ on the left. Oropharynx is clear.   Eyes: Right eye exhibits no discharge. Left eye exhibits no discharge.   Neck: No neck rigidity " present.   Cardiovascular: Normal rate.   Pulmonary/Chest: Effort normal.   Abdominal: Normal appearance.   Musculoskeletal: Normal range of motion.         General: Normal range of motion.   Neurological: He is alert and oriented to person, place, and time.   Skin: Skin is warm, dry and not diaphoretic. Capillary refill takes less than 2 seconds.   Psychiatric: His behavior is normal. Mood normal.   Nursing note and vitals reviewed.        Results for orders placed or performed in visit on 08/13/22   POCT COVID-19 Rapid Screening   Result Value Ref Range    POC Rapid COVID Negative Negative     Acceptable Yes      Assessment:       1. Right acute otitis media    2. Pharyngitis, unspecified etiology    3. Smoker          Plan:         Right acute otitis media  -     amoxicillin-clavulanate 875-125mg (AUGMENTIN) 875-125 mg per tablet; Take 1 tablet by mouth every 12 (twelve) hours. for 10 days  Dispense: 20 tablet; Refill: 0    Pharyngitis, unspecified etiology  -     POCT COVID-19 Rapid Screening    Smoker  -     Ambulatory referral/consult to Smoking Cessation Program      Patient Instructions   Oral fluids  Rest  Steam (hot showers, hot tea, hot soup)  Blow nose often

## 2022-09-14 DIAGNOSIS — E11.9 TYPE 2 DIABETES MELLITUS WITHOUT COMPLICATION, UNSPECIFIED WHETHER LONG TERM INSULIN USE: ICD-10-CM

## 2022-09-19 ENCOUNTER — PATIENT MESSAGE (OUTPATIENT)
Dept: ADMINISTRATIVE | Facility: HOSPITAL | Age: 37
End: 2022-09-19
Payer: COMMERCIAL

## 2022-09-20 DIAGNOSIS — E11.9 TYPE 2 DIABETES MELLITUS WITHOUT COMPLICATION, WITHOUT LONG-TERM CURRENT USE OF INSULIN: ICD-10-CM

## 2022-09-20 RX ORDER — DULAGLUTIDE 1.5 MG/.5ML
1.5 INJECTION, SOLUTION SUBCUTANEOUS
Qty: 12 PEN | Refills: 3 | Status: SHIPPED | OUTPATIENT
Start: 2022-09-20 | End: 2022-10-03 | Stop reason: SDUPTHER

## 2022-09-20 NOTE — TELEPHONE ENCOUNTER
No new care gaps identified.  [facility] Embedded Care Gaps. Reference number: [id number]. 9/20/2022   8:10:58 AM YANCIT

## 2022-09-20 NOTE — TELEPHONE ENCOUNTER
----- Message from Radha Martinez sent at 9/20/2022  8:13 AM CDT -----  Type:  RX Refill Request    Who Called: Pt   Refill or New Rx:refill   RX Name and Strength:dulaglutide (TRULICITY) 1.5 mg/0.5 mL pen injector  How is the patient currently taking it? (ex. 1XDay):1x week   Is this a 30 day or 90 day RX:30  Preferred Pharmacy with phone number:[facility]   Phone: [phone]  Fax:  [phone]  Would the patient rather a call back or a response via [facility]? Call back   Best Call Back Number:[phone]  Additional Information:

## 2022-09-20 NOTE — TELEPHONE ENCOUNTER
Refill Routing Note   Medication(s) are not appropriate for processing by [facility] for the following reason(s):      - Medication not active on medication list    [facility] action(s):  Defer       Medication Therapy Plan: Previously ordered with an expiration date, , defer to you  Medication reconciliation completed: No     Appointments  past 12m or future 3m with PCP    Date Provider   Last Visit   3/28/2022 Ebony Collins, DO   Next Visit   10/3/2022 Ebony Collins, DO   ED visits in past 90 days: 0        Note composed:10:18 AM 2022

## 2022-09-22 ENCOUNTER — TELEPHONE (OUTPATIENT)
Dept: FAMILY MEDICINE | Facility: CLINIC | Age: 37
End: 2022-09-22
Payer: COMMERCIAL

## 2022-09-22 DIAGNOSIS — E11.9 TYPE 2 DIABETES MELLITUS WITHOUT COMPLICATION, WITHOUT LONG-TERM CURRENT USE OF INSULIN: Primary | ICD-10-CM

## 2022-09-22 NOTE — TELEPHONE ENCOUNTER
----- Message from Ebony Collins DO sent at 3/28/2022  8:20 AM CDT -----  Regarding: Call and remind patient to have labs done at [facility]  Appt 10/3/22 and rec labs 1 week prior

## 2022-09-26 ENCOUNTER — TELEPHONE (OUTPATIENT)
Dept: FAMILY MEDICINE | Facility: CLINIC | Age: 37
End: 2022-09-26
Payer: COMMERCIAL

## 2022-09-26 NOTE — TELEPHONE ENCOUNTER
Please remind him that he has to get his labs done at [facility] prior to visit 10/03/2022    Dr. Ebony Collins D.O.   [facility]

## 2022-09-29 ENCOUNTER — TELEPHONE (OUTPATIENT)
Dept: FAMILY MEDICINE | Facility: CLINIC | Age: 37
End: 2022-09-29
Payer: COMMERCIAL

## 2022-09-29 NOTE — TELEPHONE ENCOUNTER
----- Message from Merary May sent at 9/28/2022  4:17 PM CDT -----  Type:  Needs Medical Advice    Who Called: pt  Symptoms (please be specific): pt has a appointment on 10/03/22 and the pt would like to have all lab orders set to [facility] on [address] [facility] [address]     Would the patient rather a call back or a response via [facility]? call  Best Call Back Number: [phone]  Additional Information:

## 2022-09-29 NOTE — TELEPHONE ENCOUNTER
Spoke with pt, pt stated he did not mean to call he is going to get his labs done in about a hour.

## 2022-10-01 LAB
BUN SERPL-MCNC: 10 MG/DL (ref 7–25)
BUN/CREAT SERPL: NORMAL (CALC) (ref 6–22)
CALCIUM SERPL-MCNC: 9.8 MG/DL (ref 8.6–10.3)
CHLORIDE SERPL-SCNC: 101 MMOL/L (ref 98–110)
CO2 SERPL-SCNC: 32 MMOL/L (ref 20–32)
CREAT SERPL-MCNC: 0.6 MG/DL (ref 0.6–1.26)
EGFR: 128 ML/MIN/1.73M2
GLUCOSE SERPL-MCNC: 70 MG/DL (ref 65–99)
HBA1C MFR BLD: 5.6 % OF TOTAL HGB
POTASSIUM SERPL-SCNC: 4.3 MMOL/L (ref 3.5–5.3)
SODIUM SERPL-SCNC: 141 MMOL/L (ref 135–146)

## 2022-10-03 ENCOUNTER — OFFICE VISIT (OUTPATIENT)
Dept: FAMILY MEDICINE | Facility: CLINIC | Age: 37
End: 2022-10-03
Payer: COMMERCIAL

## 2022-10-03 VITALS
WEIGHT: 315 LBS | OXYGEN SATURATION: 98 % | BODY MASS INDEX: 57.96 KG/M2 | SYSTOLIC BLOOD PRESSURE: 128 MMHG | HEART RATE: 77 BPM | DIASTOLIC BLOOD PRESSURE: 84 MMHG

## 2022-10-03 DIAGNOSIS — E11.9 TYPE 2 DIABETES MELLITUS WITHOUT COMPLICATION, WITHOUT LONG-TERM CURRENT USE OF INSULIN: ICD-10-CM

## 2022-10-03 DIAGNOSIS — I15.2 HYPERTENSION ASSOCIATED WITH TYPE 2 DIABETES MELLITUS: Primary | ICD-10-CM

## 2022-10-03 DIAGNOSIS — F17.210 TOBACCO DEPENDENCE DUE TO CIGARETTES: ICD-10-CM

## 2022-10-03 DIAGNOSIS — E11.59 HYPERTENSION ASSOCIATED WITH TYPE 2 DIABETES MELLITUS: Primary | ICD-10-CM

## 2022-10-03 DIAGNOSIS — E66.01 CLASS 3 SEVERE OBESITY DUE TO EXCESS CALORIES WITH SERIOUS COMORBIDITY AND BODY MASS INDEX (BMI) OF 50.0 TO 59.9 IN ADULT: ICD-10-CM

## 2022-10-03 PROCEDURE — 4010F PR ACE/ARB THEARPY RXD/TAKEN: ICD-10-PCS | Mod: CPTII,S$GLB,, | Performed by: FAMILY MEDICINE

## 2022-10-03 PROCEDURE — 90471 FLU VACCINE (QUAD) GREATER THAN OR EQUAL TO 3YO PRESERVATIVE FREE IM: ICD-10-PCS | Mod: S$GLB,,, | Performed by: FAMILY MEDICINE

## 2022-10-03 PROCEDURE — 3079F PR MOST RECENT DIASTOLIC BLOOD PRESSURE 80-89 MM HG: ICD-10-PCS | Mod: CPTII,S$GLB,, | Performed by: FAMILY MEDICINE

## 2022-10-03 PROCEDURE — 3008F PR BODY MASS INDEX (BMI) DOCUMENTED: ICD-10-PCS | Mod: CPTII,S$GLB,, | Performed by: FAMILY MEDICINE

## 2022-10-03 PROCEDURE — 99999 PR PBB SHADOW E&M-EST. PATIENT-LVL III: ICD-10-PCS | Mod: PBBFAC,,, | Performed by: FAMILY MEDICINE

## 2022-10-03 PROCEDURE — 3044F HG A1C LEVEL LT 7.0%: CPT | Mod: CPTII,S$GLB,, | Performed by: FAMILY MEDICINE

## 2022-10-03 PROCEDURE — 3066F NEPHROPATHY DOC TX: CPT | Mod: CPTII,S$GLB,, | Performed by: FAMILY MEDICINE

## 2022-10-03 PROCEDURE — 3079F DIAST BP 80-89 MM HG: CPT | Mod: CPTII,S$GLB,, | Performed by: FAMILY MEDICINE

## 2022-10-03 PROCEDURE — 1159F MED LIST DOCD IN RCRD: CPT | Mod: CPTII,S$GLB,, | Performed by: FAMILY MEDICINE

## 2022-10-03 PROCEDURE — 4010F ACE/ARB THERAPY RXD/TAKEN: CPT | Mod: CPTII,S$GLB,, | Performed by: FAMILY MEDICINE

## 2022-10-03 PROCEDURE — 99999 PR PBB SHADOW E&M-EST. PATIENT-LVL III: CPT | Mod: PBBFAC,,, | Performed by: FAMILY MEDICINE

## 2022-10-03 PROCEDURE — 3061F NEG MICROALBUMINURIA REV: CPT | Mod: CPTII,S$GLB,, | Performed by: FAMILY MEDICINE

## 2022-10-03 PROCEDURE — 3066F PR DOCUMENTATION OF TREATMENT FOR NEPHROPATHY: ICD-10-PCS | Mod: CPTII,S$GLB,, | Performed by: FAMILY MEDICINE

## 2022-10-03 PROCEDURE — 90686 FLU VACCINE (QUAD) GREATER THAN OR EQUAL TO 3YO PRESERVATIVE FREE IM: ICD-10-PCS | Mod: S$GLB,,, | Performed by: FAMILY MEDICINE

## 2022-10-03 PROCEDURE — 99214 PR OFFICE/OUTPT VISIT, EST, LEVL IV, 30-39 MIN: ICD-10-PCS | Mod: 25,S$GLB,, | Performed by: FAMILY MEDICINE

## 2022-10-03 PROCEDURE — 3074F SYST BP LT 130 MM HG: CPT | Mod: CPTII,S$GLB,, | Performed by: FAMILY MEDICINE

## 2022-10-03 PROCEDURE — 90686 IIV4 VACC NO PRSV 0.5 ML IM: CPT | Mod: S$GLB,,, | Performed by: FAMILY MEDICINE

## 2022-10-03 PROCEDURE — 3008F BODY MASS INDEX DOCD: CPT | Mod: CPTII,S$GLB,, | Performed by: FAMILY MEDICINE

## 2022-10-03 PROCEDURE — 3074F PR MOST RECENT SYSTOLIC BLOOD PRESSURE < 130 MM HG: ICD-10-PCS | Mod: CPTII,S$GLB,, | Performed by: FAMILY MEDICINE

## 2022-10-03 PROCEDURE — 3044F PR MOST RECENT HEMOGLOBIN A1C LEVEL <7.0%: ICD-10-PCS | Mod: CPTII,S$GLB,, | Performed by: FAMILY MEDICINE

## 2022-10-03 PROCEDURE — 90471 IMMUNIZATION ADMIN: CPT | Mod: S$GLB,,, | Performed by: FAMILY MEDICINE

## 2022-10-03 PROCEDURE — 1159F PR MEDICATION LIST DOCUMENTED IN MEDICAL RECORD: ICD-10-PCS | Mod: CPTII,S$GLB,, | Performed by: FAMILY MEDICINE

## 2022-10-03 PROCEDURE — 99214 OFFICE O/P EST MOD 30 MIN: CPT | Mod: 25,S$GLB,, | Performed by: FAMILY MEDICINE

## 2022-10-03 PROCEDURE — 3061F PR NEG MICROALBUMINURIA RESULT DOCUMENTED/REVIEW: ICD-10-PCS | Mod: CPTII,S$GLB,, | Performed by: FAMILY MEDICINE

## 2022-10-03 RX ORDER — TIRZEPATIDE 2.5 MG/.5ML
2.5 INJECTION, SOLUTION SUBCUTANEOUS
Qty: 12 PEN | Refills: 1 | Status: SHIPPED | OUTPATIENT
Start: 2022-10-03 | End: 2022-10-03

## 2022-10-03 RX ORDER — TIRZEPATIDE 2.5 MG/.5ML
2.5 INJECTION, SOLUTION SUBCUTANEOUS
Qty: 4 PEN | Refills: 5 | Status: SHIPPED | OUTPATIENT
Start: 2022-10-03 | End: 2023-04-04

## 2022-10-03 NOTE — PROGRESS NOTES
FAMILY MEDICINE  [facility]    Reason for visit:   Chief Complaint   Patient presents with    Follow-up    Diabetes         SUBJECTIVE: Matthieu Son is a 37 y.o. male  - smoker (started 18 years old 1 pack per day. 10/2022 changed to vaping) with morbid obesity, venous insufficiency with history lower extremity venous stasis ulcer that have healed, hypertension, type 2 diabetes, and history of covid 19 (8/2021) presents for follow-up diabetes     Today he reports that he is doing well.  He has been continuing Trulicity 1.5 mg weekly for to assist with weight loss.  He reports that he quit smoking 8/2022 and started vaping.  He still has a goal to discontinuing smoking altogether.  His weight loss has plateaued.  He has gained about 30 lb since his last visit 03/28/2022.    1. Diabetes Type 2     Age diagnosed: 33 yo     Current treatment regimen:   dulaglutide (TRULICITY) 1.5 mg/0.5 mL pen injector, Inject 1.5 mg into the skin every 7 days.    Prior medication:  metFORMIN (GLUCOPHAGE-XR) 500 MG ER 24hr tablet, Take 1 tablet (500 mg total) by mouth 2 (two) times daily with meals  - discontinue 9/2021 visit since A1c had improved to 5.5%.  He opted to stay on Trulicity since have been helpful for his weight loss     Side effects from treatment: denies  Complications of diabetes: none     Glucometer: yes  Glucose monitoring: daily       Lab Results       Component                Value               Date                       HGBA1C                   5.6                 09/30/2022              Hemoglobin A1C       Date                     Value               Ref Range           Status                03/24/2022               5.5                 <5.7 % of tota*     Final              Comment:    For the purpose of screening for the presence of  diabetes:     <5.7%       Consistent with the absence of diabetes  5.7-6.4%    Consistent with increased risk for diabetes              (prediabetes)  > or  =6.5%  Consistent with diabetes     This assay result is consistent with a decreased risk  of diabetes.     Currently, no consensus exists regarding use of  hemoglobin A1c for diagnosis of diabetes in children.     According to American Diabetes Association (ADA)  guidelines, hemoglobin A1c <7.0% represents optimal  control in non-pregnant diabetic patients. Different  metrics may apply to specific patient populations.   Standards of Medical Care in Diabetes(ADA).             09/27/2021               5.5                 <5.7 % of tota*     Final              Low-dose statin:  Pravastatin  Last eye exam: 9/1/21 due and discussed at visit  Last foot exam:  3/28/2022       Vaccines:   Influenza: recommended  Pneumovax: 23:  7/29/19  Prevnar 13: NA  PCV 20 recommended  Covid-19 booster due     Wt Readings from Last 10 Encounters:  10/03/22 : (!) 188.5 kg (415 lb 9.6 oz)  08/13/22 : (!) 174.6 kg (385 lb)  03/28/22 : (!) 175 kg (385 lb 12.9 oz)  03/29/21 : (!) 187.8 kg (414 lb)  11/27/19 : 117.8 kg (259 lb 9.6 oz)  10/28/19 : (!) 211.2 kg (465 lb 9.6 oz)  07/29/19 : (!) 221.4 kg (488 lb)  06/28/19 : (!) 224.8 kg (495 lb 8 oz)  05/28/19 : (!) 229.5 kg (506 lb)      2. Hypertension  - daibetes  - BP goal < 130/80    Current medication treatment:   hydroCHLOROthiazide (HYDRODIURIL) 25 MG tablet, Take 1 tablet (25 mg total) by mouth once daily., Disp: 90 tablet, Rfl: 3  losartan (COZAAR) 50 MG tablet, TAKE 1 TABLET(50 MG) BY MOUTH EVERY DAY, Disp: 90 tablet, Rfl: 3    Medication side effects: no medication side effects noted  taking medications as instructed, no medication side effects noted, no TIAs, no chest pain on exertion, no dyspnea on exertion, no swelling of ankles    Exercise regimen: not active    Home BP cuff: Yes  How often does patient monitoring BP? PRN             Review of Systems   All other systems reviewed and are negative.      HISTORY:   Past Medical History:   Diagnosis Date    Diabetes mellitus, type 2      Hypertension     Venous stasis ulcer of calf limited to breakdown of skin with varicose veins 6/28/2019    Venous ulcers of both lower extremities 5/29/2019       Past Surgical History:   Procedure Laterality Date    WISDOM TOOTH EXTRACTION         Family History   Problem Relation Age of Onset    Diabetes Mother     Hypertension Mother     Breast cancer Mother     Hyperlipidemia Mother     Cancer Mother         breast cancer    Diabetes Father     Hypertension Father     Hyperlipidemia Father     Peripheral vascular disease Father     Heart attack Father     No Known Problems Sister     No Known Problems Son     Colon cancer Maternal Grandmother     Heart disease Maternal Grandfather     No Known Problems Paternal Grandmother     No Known Problems Paternal Grandfather        Social History     Tobacco Use    Smoking status: Every Day     Packs/day: 0.50     Types: Cigarettes     Start date: 2/9/2003    Smokeless tobacco: Never   Substance Use Topics    Alcohol use: Yes     Comment: social    Drug use: Never       Social History     Social History Narrative     to Kendra. 1 son (2021 4 years old).  and manager of [employer]. Occasional social alcohol use. Smoker since 17 yo about 1 ppd.  03/2021 reports that he decreased his smoking to half a pack a day.  Denies illicit drugs.       ALLERGIES:   Review of patient's allergies indicates:  No Known Allergies    MEDS:     Current Outpatient Medications:     aspirin (ECOTRIN) 81 MG EC tablet, Take 81 mg by mouth once daily., Disp: , Rfl:     hydroCHLOROthiazide (HYDRODIURIL) 25 MG tablet, Take 1 tablet (25 mg total) by mouth once daily., Disp: 90 tablet, Rfl: 3    losartan (COZAAR) 50 MG tablet, TAKE 1 TABLET(50 MG) BY MOUTH EVERY DAY, Disp: 90 tablet, Rfl: 3    multivit-minerals/folic acid (ONE-A-DAY MEN VITACRAVES ORAL), Take 1 tablet by mouth once daily., Disp: , Rfl:     pravastatin (PRAVACHOL) 10 MG tablet, Take 1 tablet (10 mg total) by mouth once  daily., Disp: 90 tablet, Rfl: 3    blood-glucose meter kit, Use as instructed (Patient not taking: Reported on 10/3/2022), Disp: 1 each, Rfl: 0    lancets Misc, 1 lancet by Misc.(Non-Drug; Combo Route) route once daily. (Patient not taking: Reported on 10/3/2022), Disp: 100 each, Rfl: 3    tirzepatide (MOUNJARO) 2.5 mg/0.5 mL PnIj, Inject 2.5 mg into the skin every 7 days., Disp: 4 pen, Rfl: 5    TRUE METRIX GLUCOSE METER Misc, USE UTD, Disp: , Rfl: 0    TRUE METRIX GLUCOSE TEST STRIP Strp, CHECK BLOOD SUGAR EVERY DAY (Patient not taking: Reported on 10/3/2022), Disp: 100 strip, Rfl: 11    TRUEPLUS LANCETS 33 gauge Misc, CHECK BLOOD SUGAR QD, Disp: , Rfl: 3    Vital signs:   Vitals:    10/03/22 0814   BP: 128/84   Pulse: 77   SpO2: 98%   Weight: (!) 188.5 kg (415 lb 9.6 oz)       Body mass index is 57.96 kg/m².    PHYSICAL EXAM:     Physical Exam  Constitutional:       General: He is not in acute distress.  Cardiovascular:      Rate and Rhythm: Normal rate and regular rhythm.      Pulses: Normal pulses.           Dorsalis pedis pulses are 2+ on the right side and 2+ on the left side.        Posterior tibial pulses are 2+ on the right side and 2+ on the left side.      Heart sounds: Normal heart sounds. No murmur heard.    No friction rub. No gallop.   Pulmonary:      Effort: Pulmonary effort is normal.      Breath sounds: Normal breath sounds. No wheezing, rhonchi or rales.   Musculoskeletal:      Cervical back: Neck supple.      Right lower leg: No edema.      Left lower leg: No edema.   Feet:      Right foot:      Protective Sensation: 5 sites tested.  5 sites sensed.      Skin integrity: Skin integrity normal.      Left foot:      Protective Sensation: 5 sites tested.  5 sites sensed.      Skin integrity: Skin integrity normal.   Skin:     General: Skin is warm.   Neurological:      Mental Status: He is alert.         PHQ4 = No data recorded    PERTINENT RESULTS:   No visits with results within 1 Week(s) from  this visit.   Latest known visit with results is:   Telephone on 09/22/2022   Component Date Value Ref Range Status    Glucose 09/30/2022 70  65 - 99 mg/dL Final    Comment:               Fasting reference interval         BUN 09/30/2022 10  7 - 25 mg/dL Final    Creatinine 09/30/2022 0.60  0.60 - 1.26 mg/dL Final    EGFR 09/30/2022 128  > OR = 60 mL/min/1.73m2 Final    Comment: The eGFR is based on the CKD-EPI 2021 equation. To calculate   the new eGFR from a previous Creatinine or Cystatin C  result, go to https://www.kidney.org/professionals/  kdoqi/gfr%5Fcalculator      BUN/Creatinine Ratio 09/30/2022 NOT APPLICABLE  6 - 22 (calc) Final    Sodium 09/30/2022 141  135 - 146 mmol/L Final    Potassium 09/30/2022 4.3  3.5 - 5.3 mmol/L Final    Chloride 09/30/2022 101  98 - 110 mmol/L Final    CO2 09/30/2022 32  20 - 32 mmol/L Final    Calcium 09/30/2022 9.8  8.6 - 10.3 mg/dL Final    Hemoglobin A1C 09/30/2022 5.6  <5.7 % of total Hgb Final    Comment: For the purpose of screening for the presence of  diabetes:     <5.7%       Consistent with the absence of diabetes  5.7-6.4%    Consistent with increased risk for diabetes              (prediabetes)  > or =6.5%  Consistent with diabetes     This assay result is consistent with a decreased risk  of diabetes.     Currently, no consensus exists regarding use of  hemoglobin A1c for diagnosis of diabetes in children.     According to American Diabetes Association (ADA)  guidelines, hemoglobin A1c <7.0% represents optimal  control in non-pregnant diabetic patients. Different  metrics may apply to specific patient populations.   Standards of Medical Care in Diabetes(ADA).             ASSESSMENT/PLAN:  Problem List Items Addressed This Visit          Endocrine    Class 3 severe obesity due to excess calories with serious comorbidity and body mass index (BMI) of 50.0 to 59.9 in adult    Overview     - his weight loss has plateaued  - his diabetes is well controlled  - discussed  changing his Trulicity to Mounjaro for weight management  - counseling regarding new medication including expected results, potential side effects, and appropriate use.  - questions elicited and answered  - encouraged to patient to notify me of any questions or concerns         Relevant Orders    TSH    Hemoglobin A1C    Hypertension associated with type 2 diabetes mellitus - Primary    Overview     - well controlled  - continue current medications         Relevant Orders    CBC Without Differential    Comprehensive Metabolic Panel    Lipid Panel    Type 2 diabetes mellitus without complication, without long-term current use of insulin    Overview     Lab Results   Component Value Date    HGBA1C 5.6 09/30/2022   - A1C still in normal range since 9/27/21  - Great job!  - change Trulicity to Mounjaro since weight loss has plateaued         Relevant Medications    tirzepatide (MOUNJARO) 2.5 mg/0.5 mL PnIj    Other Relevant Orders    Comprehensive Metabolic Panel    Lipid Panel    TSH    Hemoglobin A1C    Microalbumin/Creatinine Ratio, Urine    Ambulatory referral/consult to Optometry       Other    Tobacco dependence due to cigarettes    Overview     - started 18 years old  - 1 pack per day  - 03/2021 reduce to half a pack a day   - 8/2022 changed to vaping and decreased used  - recommend quit smoking  - declined smoking cessation program at this time            ORDERS:   Orders Placed This Encounter    Influenza - Quadrivalent *Preferred* (6 months+) (PF)    CBC Without Differential    Comprehensive Metabolic Panel    Lipid Panel    TSH    Hemoglobin A1C    Microalbumin/Creatinine Ratio, Urine    Ambulatory referral/consult to Optometry    tirzepatide (MOUNJARO) 2.5 mg/0.5 mL PnIj         Vaccines recommended: covid-19, flu and PCV 20  - he opted to wait till next visit for his Prevnar     Today I discussed that I will no longer be practicing at [facility] effective 1/2023. My new location will be at [facility]. We assisted Matthieu Son in establishing with a new provider for follow-up. I encouraged Matthieu Son to notify me with an questions or concerns prior to my departure.     Follow-up in 6 months with labs 1 week prior [facility] or sooner if any concerns.  This note is dictated using the M*Modal Fluency Direct word recognition program. There are word recognition mistakes that are occasionally missed on review.    Dr. Ebony Collins D.O.   [facility]

## 2022-10-04 ENCOUNTER — PATIENT MESSAGE (OUTPATIENT)
Dept: FAMILY MEDICINE | Facility: CLINIC | Age: 37
End: 2022-10-04
Payer: COMMERCIAL

## 2022-10-04 ENCOUNTER — TELEPHONE (OUTPATIENT)
Dept: FAMILY MEDICINE | Facility: CLINIC | Age: 37
End: 2022-10-04
Payer: COMMERCIAL

## 2022-10-04 NOTE — TELEPHONE ENCOUNTER
----- Message from Radha Perez MA sent at 10/3/2022  8:35 AM CDT -----  6 months in toup April appointment

## 2022-10-10 ENCOUNTER — PATIENT MESSAGE (OUTPATIENT)
Dept: ADMINISTRATIVE | Facility: HOSPITAL | Age: 37
End: 2022-10-10

## 2022-10-19 ENCOUNTER — PATIENT MESSAGE (OUTPATIENT)
Dept: FAMILY MEDICINE | Facility: CLINIC | Age: 37
End: 2022-10-19

## 2023-01-18 ENCOUNTER — PATIENT MESSAGE (OUTPATIENT)
Dept: ADMINISTRATIVE | Facility: HOSPITAL | Age: 38
End: 2023-01-18

## 2023-02-01 ENCOUNTER — PATIENT MESSAGE (OUTPATIENT)
Dept: PRIMARY CARE CLINIC | Facility: CLINIC | Age: 38
End: 2023-02-01
Payer: COMMERCIAL

## 2023-02-11 DIAGNOSIS — E11.65 TYPE 2 DIABETES MELLITUS WITH HYPERGLYCEMIA, WITHOUT LONG-TERM CURRENT USE OF INSULIN: ICD-10-CM

## 2023-02-11 RX ORDER — PRAVASTATIN SODIUM 10 MG/1
10 TABLET ORAL DAILY
Qty: 90 TABLET | Refills: 0 | Status: SHIPPED | OUTPATIENT
Start: 2023-02-11 | End: 2023-07-02 | Stop reason: SDUPTHER

## 2023-02-11 NOTE — TELEPHONE ENCOUNTER
Refill Decision Note   Matthieu Son  is requesting a refill authorization.  Brief Assessment and Rationale for Refill:  Approve     Medication Therapy Plan:       Medication Reconciliation Completed: No   Comments:     Provider Staff:     Action is required for this patient.   Please see care gap opportunities below in Care Due Message.     Thanks!  [facility]     Appointments      Date Provider   Last Visit   10/3/2022 Ebony Collins,    Next Visit   4/4/2023 Ebony Collins DO     Note composed:3:26 PM 02/11/2023           Note composed:3:26 PM 02/11/2023

## 2023-02-11 NOTE — TELEPHONE ENCOUNTER
Care Due:                  Date            Visit Type   Department     Provider  --------------------------------------------------------------------------------                                EP -                              PRIMARY      [facility]  Last Visit: 10-      CARE ([facility])   [facility] Maryse Collins                              EP -                              PRIMARY      [facility] PRIMARY  Next Visit: 04-      CARE ([facility])   CARE           Ebony Collins                                                            Last  Test          Frequency    Reason                     Performed    Due Date  --------------------------------------------------------------------------------    CMP.........  12 months..  pravastatin..............  03- 03-    Lipid Panel.  12 months..  pravastatin..............  03- 03-    [employer] Embedded Care Gaps. Reference number: [id number]. 2/11/2023   5:08:33 AM CST

## 2023-02-13 ENCOUNTER — PATIENT MESSAGE (OUTPATIENT)
Dept: PRIMARY CARE CLINIC | Facility: CLINIC | Age: 38
End: 2023-02-13
Payer: COMMERCIAL

## 2023-03-06 ENCOUNTER — TELEPHONE (OUTPATIENT)
Dept: PRIMARY CARE CLINIC | Facility: CLINIC | Age: 38
End: 2023-03-06
Payer: COMMERCIAL

## 2023-03-06 DIAGNOSIS — E11.9 TYPE 2 DIABETES MELLITUS WITHOUT COMPLICATION, WITHOUT LONG-TERM CURRENT USE OF INSULIN: Primary | ICD-10-CM

## 2023-03-06 DIAGNOSIS — Z13.29 SCREENING FOR THYROID DISORDER: ICD-10-CM

## 2023-03-06 DIAGNOSIS — E11.59 HYPERTENSION ASSOCIATED WITH TYPE 2 DIABETES MELLITUS: ICD-10-CM

## 2023-03-06 DIAGNOSIS — I15.2 HYPERTENSION ASSOCIATED WITH TYPE 2 DIABETES MELLITUS: ICD-10-CM

## 2023-03-06 NOTE — TELEPHONE ENCOUNTER
----- Message from Ebony Collins DO sent at 10/3/2022  8:56 AM CDT -----  Regarding: [facility] prior to visit  [facility] prior to visit make sure visit is scheduled

## 2023-03-06 NOTE — TELEPHONE ENCOUNTER
Orders Placed This Encounter    Comprehensive Metabolic Panel    CBC Without Differential    Lipid Panel    Microalbumin/Creatinine Ratio, Urine    TSH    Hemoglobin A1C     Dr. Ebony Collins D.O.   [facility]

## 2023-03-27 ENCOUNTER — PATIENT MESSAGE (OUTPATIENT)
Dept: PRIMARY CARE CLINIC | Facility: CLINIC | Age: 38
End: 2023-03-27
Payer: COMMERCIAL

## 2023-03-27 ENCOUNTER — TELEPHONE (OUTPATIENT)
Dept: PRIMARY CARE CLINIC | Facility: CLINIC | Age: 38
End: 2023-03-27
Payer: COMMERCIAL

## 2023-03-27 NOTE — TELEPHONE ENCOUNTER
----- Message from Ebony Collins DO sent at 3/6/2023  9:26 AM CST -----  Regarding: Labs prior to visit [facility]  Please call or send MySupport[facility]istant message to remind Matthieu Son to have labs done at [facility] prior to visit 4/6/23

## 2023-03-30 DIAGNOSIS — I15.2 HYPERTENSION ASSOCIATED WITH TYPE 2 DIABETES MELLITUS: ICD-10-CM

## 2023-03-30 DIAGNOSIS — E11.59 HYPERTENSION ASSOCIATED WITH TYPE 2 DIABETES MELLITUS: ICD-10-CM

## 2023-03-30 RX ORDER — LOSARTAN POTASSIUM 50 MG/1
50 TABLET ORAL DAILY
Qty: 90 TABLET | Refills: 1 | Status: SHIPPED | OUTPATIENT
Start: 2023-03-30 | End: 2023-07-03 | Stop reason: SDUPTHER

## 2023-03-30 NOTE — TELEPHONE ENCOUNTER
Refill Decision Note   Matthieu Son  is requesting a refill authorization.  Brief Assessment and Rationale for Refill:  Approve     Medication Therapy Plan:         Comments:     Note composed:11:00 AM 03/30/2023             Appointments     Last Visit   10/3/2022 Ebony Collins, DO   Next Visit   4/4/2023 Ebony Collins, DO

## 2023-03-30 NOTE — TELEPHONE ENCOUNTER
No new care gaps identified.  [facility] Embedded Care Gaps. Reference number: [id number]. 3/30/2023   5:08:40 AM CDT

## 2023-03-31 LAB
ALBUMIN SERPL-MCNC: 3.9 G/DL (ref 3.6–5.1)
ALBUMIN/CREAT UR: 5 MCG/MG CREAT
ALBUMIN/GLOB SERPL: 1.5 (CALC) (ref 1–2.5)
ALP SERPL-CCNC: 66 U/L (ref 36–130)
ALT SERPL-CCNC: 21 U/L (ref 9–46)
AST SERPL-CCNC: 12 U/L (ref 10–40)
BILIRUB SERPL-MCNC: 0.5 MG/DL (ref 0.2–1.2)
BUN SERPL-MCNC: 15 MG/DL (ref 7–25)
BUN/CREAT SERPL: ABNORMAL (CALC) (ref 6–22)
CALCIUM SERPL-MCNC: 9 MG/DL (ref 8.6–10.3)
CHLORIDE SERPL-SCNC: 102 MMOL/L (ref 98–110)
CHOLEST SERPL-MCNC: 145 MG/DL
CHOLEST/HDLC SERPL: 3.2 (CALC)
CO2 SERPL-SCNC: 28 MMOL/L (ref 20–32)
CREAT SERPL-MCNC: 0.66 MG/DL (ref 0.6–1.26)
CREAT UR-MCNC: 115 MG/DL (ref 20–320)
EGFR: 124 ML/MIN/1.73M2
ERYTHROCYTE [DISTWIDTH] IN BLOOD BY AUTOMATED COUNT: 13 % (ref 11–15)
GLOBULIN SER CALC-MCNC: 2.6 G/DL (CALC) (ref 1.9–3.7)
GLUCOSE SERPL-MCNC: 126 MG/DL (ref 65–99)
HBA1C MFR BLD: 5.9 % OF TOTAL HGB
HCT VFR BLD AUTO: 43.6 % (ref 38.5–50)
HDLC SERPL-MCNC: 45 MG/DL
HGB BLD-MCNC: 14.5 G/DL (ref 13.2–17.1)
LDLC SERPL CALC-MCNC: 78 MG/DL (CALC)
MCH RBC QN AUTO: 28.6 PG (ref 27–33)
MCHC RBC AUTO-ENTMCNC: 33.3 G/DL (ref 32–36)
MCV RBC AUTO: 86 FL (ref 80–100)
MICROALBUMIN UR-MCNC: 0.6 MG/DL
NONHDLC SERPL-MCNC: 100 MG/DL (CALC)
PLATELET # BLD AUTO: 276 THOUSAND/UL (ref 140–400)
PMV BLD REES-ECKER: 10.9 FL (ref 7.5–12.5)
POTASSIUM SERPL-SCNC: 4.3 MMOL/L (ref 3.5–5.3)
PROT SERPL-MCNC: 6.5 G/DL (ref 6.1–8.1)
RBC # BLD AUTO: 5.07 MILLION/UL (ref 4.2–5.8)
SODIUM SERPL-SCNC: 138 MMOL/L (ref 135–146)
TRIGL SERPL-MCNC: 121 MG/DL
TSH SERPL-ACNC: 2.44 MIU/L (ref 0.4–4.5)
WBC # BLD AUTO: 8.3 THOUSAND/UL (ref 3.8–10.8)

## 2023-04-03 NOTE — PROGRESS NOTES
FAMILY MEDICINE  [facility]    Reason for visit:   Chief Complaint   Patient presents with    Follow-up    Diabetes         SUBJECTIVE: Matthieu Son is a 38 y.o. male  - smoker (started 18 years old 1 pack per day. 10/2022 changed to vaping) with morbid obesity, venous insufficiency with history lower extremity venous stasis ulcer that have healed, hypertension, type 2 diabetes, and history of covid 19 (8/2021) presents for follow-up diabetes and hypertension     Matthieu Son reports that he is doing well.  He had to switch back to his Trulicity temporarily secondary to supply chain issues.  He restarted his Mounjaro about 1 week ago    1. Diabetes Type 2     Age diagnosed: 35 yo     Current treatment regimen:   Mounjaro 2.5 mg weekly    Prior medication:  metFORMIN (GLUCOPHAGE-XR) 500 MG ER 24hr tablet, Take 1 tablet (500 mg total) by mouth 2 (two) times daily with meals  - discontinue 9/2021 visit since A1c had improved to 5.5%.  He opted to stay on Trulicity since have been helpful for his weight loss  Trucility      Side effects from treatment: denies  Complications of diabetes: none     Glucometer: yes  Glucose monitoring: daily    Lab Results       Component                Value               Date                       HGBA1C                   5.9 (H)             03/30/2023               Lab Results       Component                Value               Date                       HGBA1C                   5.6                 09/30/2022                      Low-dose statin:  Pravastatin  Last eye exam: 9/1/21 due and discussed at visit. Referral to [facility]  Last foot exam:  3/28/2022       Vaccines:   Influenza: recommended  Pneumovax: 23:  7/29/19  Prevnar 13: NA  PCV 20 recommended today 4/4/23  Covid-19 booster due     Wt Readings from Last 10 Encounters:  04/04/23 : (!) 194.7 kg (429 lb 5.5 oz)  10/03/22 : (!) 188.5 kg (415 lb 9.6 oz)  08/13/22 : (!) 174.6 kg (385  lb)  03/28/22 : (!) 175 kg (385 lb 12.9 oz)  03/29/21 : (!) 187.8 kg (414 lb)  11/27/19 : 117.8 kg (259 lb 9.6 oz)  10/28/19 : (!) 211.2 kg (465 lb 9.6 oz)  07/29/19 : (!) 221.4 kg (488 lb)  06/28/19 : (!) 224.8 kg (495 lb 8 oz)  05/28/19 : (!) 229.5 kg (506 lb)      2. Hypertension  - daibetes  - BP goal < 130/80    Current medication treatment:   hydroCHLOROthiazide (HYDRODIURIL) 25 MG tablet, Take 1 tablet (25 mg total) by mouth once daily., Disp: 90 tablet, Rfl: 3  losartan (COZAAR) 50 MG tablet, Take 1 tablet (50 mg total) by mouth once daily., Disp: 90 tablet, Rfl: 1    Medication side effects: no medication side effects noted  taking medications as instructed, no medication side effects noted, no TIAs, no chest pain on exertion, no dyspnea on exertion, no swelling of ankles    Exercise regimen: active at work    Home BP cuff: Yes  How often does patient monitoring BP? PRN            Review of Systems   All other systems reviewed and are negative.    HEALTH MAINTENANCE:   Health Maintenance   Topic Date Due    Eye Exam  09/01/2022    Hemoglobin A1c  09/30/2023    Foot Exam  10/03/2023    Lipid Panel  03/30/2024    TETANUS VACCINE  08/18/2026    Hepatitis C Screening  Completed     Health Maintenance Topics with due status: Not Due       Topic Last Completion Date    TETANUS VACCINE 08/18/2016    Foot Exam 10/03/2022    Diabetes Urine Screening 03/30/2023    Lipid Panel 03/30/2023    Hemoglobin A1c 03/30/2023     Health Maintenance Due   Topic Date Due    COVID-19 Vaccine (2 - Pfizer series) 10/23/2021    Eye Exam  09/01/2022       HISTORY:   Past Medical History:   Diagnosis Date    Diabetes mellitus, type 2     Hypertension     Venous stasis ulcer of calf limited to breakdown of skin with varicose veins 6/28/2019    Venous ulcers of both lower extremities 5/29/2019       Past Surgical History:   Procedure Laterality Date    WISDOM TOOTH EXTRACTION         Family History   Problem Relation Age of Onset     Diabetes Mother     Hypertension Mother     Breast cancer Mother     Hyperlipidemia Mother     Cancer Mother         breast cancer    Diabetes Father     Hypertension Father     Hyperlipidemia Father     Peripheral vascular disease Father     Heart attack Father     No Known Problems Sister     No Known Problems Son     Colon cancer Maternal Grandmother     Heart disease Maternal Grandfather     No Known Problems Paternal Grandmother     No Known Problems Paternal Grandfather        Social History     Tobacco Use    Smoking status: Every Day     Types: Vaping with nicotine     Passive exposure: Current    Smokeless tobacco: Never   Substance Use Topics    Alcohol use: Yes     Comment: social    Drug use: Never       Social History     Social History Narrative     to Kendra. 1 son (2023 6 years old); [place].  and manager [employer] Occasional social alcohol use. Smoker since 19 yo about 1 ppd.  03/2021 reports that he decreased his smoking to half a pack a day.  Denies illicit drugs.       ALLERGIES:   Review of patient's allergies indicates:  No Known Allergies    MEDS:   Outpatient Medications as of 4/4/2023   Medication Sig Dispense Refill    aspirin (ECOTRIN) 81 MG EC tablet Take 81 mg by mouth once daily.      hydroCHLOROthiazide (HYDRODIURIL) 25 MG tablet Take 1 tablet (25 mg total) by mouth once daily. 90 tablet 3    losartan (COZAAR) 50 MG tablet Take 1 tablet (50 mg total) by mouth once daily. 90 tablet 1    multivit-minerals/folic acid (ONE-A-DAY MEN VITACRAVES ORAL) Take 1 tablet by mouth once daily.      pravastatin (PRAVACHOL) 10 MG tablet Take 1 tablet (10 mg total) by mouth once daily. 90 tablet 0    TRUE METRIX GLUCOSE METER Misc USE UTD  0    TRUEPLUS LANCETS 33 gauge Misc CHECK BLOOD SUGAR QD  3    blood-glucose meter kit Use as instructed (Patient not taking: Reported on 10/3/2022) 1 each 0    lancets Misc 1 lancet by Misc.(Non-Drug; Combo Route) route once daily. (Patient  "not taking: Reported on 10/3/2022) 100 each 3    TRUE METRIX GLUCOSE TEST STRIP Strp CHECK BLOOD SUGAR EVERY DAY (Patient not taking: Reported on 4/4/2023) 100 strip 11     No current facility-administered medications on file as of 4/4/2023.       Vital signs:   Vitals:    04/04/23 1019 04/04/23 1043   BP: (!) 132/94 136/84   Pulse: 72    SpO2: 99%    Weight: (!) 194.7 kg (429 lb 5.5 oz)    Height: 5' 11" (1.803 m)      Body mass index is 59.88 kg/m².    PHYSICAL EXAM:     Physical Exam  Constitutional:       General: He is not in acute distress.  Cardiovascular:      Rate and Rhythm: Normal rate and regular rhythm.      Pulses: Normal pulses.           Dorsalis pedis pulses are 2+ on the right side and 2+ on the left side.        Posterior tibial pulses are 2+ on the right side and 2+ on the left side.      Heart sounds: Normal heart sounds. No murmur heard.    No friction rub. No gallop.   Pulmonary:      Effort: Pulmonary effort is normal.      Breath sounds: Normal breath sounds. No wheezing, rhonchi or rales.   Musculoskeletal:      Cervical back: Neck supple.      Right lower leg: No edema.      Left lower leg: No edema.   Feet:      Right foot:      Protective Sensation: 5 sites tested.  5 sites sensed.      Skin integrity: Skin integrity normal.      Left foot:      Protective Sensation: 5 sites tested.  5 sites sensed.      Skin integrity: Skin integrity normal.   Skin:     General: Skin is warm.   Neurological:      Mental Status: He is alert.           PERTINENT RESULTS:   No visits with results within 1 Week(s) from this visit.   Latest known visit with results is:   Telephone on 03/06/2023   Component Date Value Ref Range Status    Glucose 03/30/2023 126 (H)  65 - 99 mg/dL Final    Comment:               Fasting reference interval     For someone without known diabetes, a glucose  value >125 mg/dL indicates that they may have  diabetes and this should be confirmed with a  follow-up test.         BUN " 03/30/2023 15  7 - 25 mg/dL Final    Creatinine 03/30/2023 0.66  0.60 - 1.26 mg/dL Final    eGFR 03/30/2023 124  > OR = 60 mL/min/1.73m2 Final    Comment: The eGFR is based on the CKD-EPI 2021 equation. To calculate   the new eGFR from a previous Creatinine or Cystatin C  result, go to https://www.kidney.org/professionals/  kdoqi/gfr%5Fcalculator      BUN/Creatinine Ratio 03/30/2023 NOT APPLICABLE  6 - 22 (calc) Final    Sodium 03/30/2023 138  135 - 146 mmol/L Final    Potassium 03/30/2023 4.3  3.5 - 5.3 mmol/L Final    Chloride 03/30/2023 102  98 - 110 mmol/L Final    CO2 03/30/2023 28  20 - 32 mmol/L Final    Calcium 03/30/2023 9.0  8.6 - 10.3 mg/dL Final    Total Protein 03/30/2023 6.5  6.1 - 8.1 g/dL Final    Albumin 03/30/2023 3.9  3.6 - 5.1 g/dL Final    Globulin, Total 03/30/2023 2.6  1.9 - 3.7 g/dL (calc) Final    Albumin/Globulin Ratio 03/30/2023 1.5  1.0 - 2.5 (calc) Final    Total Bilirubin 03/30/2023 0.5  0.2 - 1.2 mg/dL Final    Alkaline Phosphatase 03/30/2023 66  36 - 130 U/L Final    AST 03/30/2023 12  10 - 40 U/L Final    ALT 03/30/2023 21  9 - 46 U/L Final    TSH 03/30/2023 2.44  0.40 - 4.50 mIU/L Final    Hemoglobin A1C 03/30/2023 5.9 (H)  <5.7 % of total Hgb Final    Comment: For someone without known diabetes, a hemoglobin   A1c value between 5.7% and 6.4% is consistent with  prediabetes and should be confirmed with a   follow-up test.     For someone with known diabetes, a value <7%  indicates that their diabetes is well controlled. A1c  targets should be individualized based on duration of  diabetes, age, comorbid conditions, and other  considerations.     This assay result is consistent with an increased risk  of diabetes.     Currently, no consensus exists regarding use of  hemoglobin A1c for diagnosis of diabetes for children.         Cholesterol 03/30/2023 145  <200 mg/dL Final    HDL 03/30/2023 45  > OR = 40 mg/dL Final    Triglycerides 03/30/2023 121  <150 mg/dL Final    LDL Cholesterol  03/30/2023 78  mg/dL (calc) Final    Comment: Reference range: <100     Desirable range <100 mg/dL for primary prevention;    <70 mg/dL for patients with CHD or diabetic patients   with > or = 2 CHD risk factors.     LDL-C is now calculated using the Marilyn   calculation, which is a validated novel method providing   better accuracy than the Friedewald equation in the   estimation of LDL-C.   Erik ACOSTA et al. ARLENE. 2013;310(19): 8103-7791   (http://hiyalife.ZOOM TV/faq/DLM237)      HDL/Cholesterol Ratio 03/30/2023 3.2  <5.0 (calc) Final    Non HDL Chol. (LDL+VLDL) 03/30/2023 100  <130 mg/dL (calc) Final    Comment: For patients with diabetes plus 1 major ASCVD risk   factor, treating to a non-HDL-C goal of <100 mg/dL   (LDL-C of <70 mg/dL) is considered a therapeutic   option.      Creatinine, Urine 03/30/2023 115  20 - 320 mg/dL Final    Microalb, Ur 03/30/2023 0.6  See Note: mg/dL Final    Comment: Reference Range:    Reference Range  Not established      Microalb/Creat Ratio 03/30/2023 5  <30 mcg/mg creat Final    Comment:    The ADA defines abnormalities in albumin  excretion as follows:     Albuminuria Category        Result (mcg/mg creatinine)     Normal to Mildly increased   <30  Moderately increased            Severely increased           > OR = 300     The ADA recommends that at least two of three  specimens collected within a 3-6 month period be  abnormal before considering a patient to be  within a diagnostic category.      WBC 03/30/2023 8.3  3.8 - 10.8 Thousand/uL Final    RBC 03/30/2023 5.07  4.20 - 5.80 Million/uL Final    Hemoglobin 03/30/2023 14.5  13.2 - 17.1 g/dL Final    Hematocrit 03/30/2023 43.6  38.5 - 50.0 % Final    MCV 03/30/2023 86.0  80.0 - 100.0 fL Final    MCH 03/30/2023 28.6  27.0 - 33.0 pg Final    MCHC 03/30/2023 33.3  32.0 - 36.0 g/dL Final    RDW 03/30/2023 13.0  11.0 - 15.0 % Final    Platelets 03/30/2023 276  140 - 400 Thousand/uL Final    MPV  03/30/2023 10.9  7.5 - 12.5 fL Final       ASSESSMENT/PLAN:    1. Type 2 diabetes mellitus without complication, without long-term current use of insulin  Overview:  Lab Results   Component Value Date    HGBA1C 5.9 (H) 03/30/2023     - well controlled  - titrating Mounjaro for weight loss  - patient encouraged to notify me with any changes    Orders:  -     Comprehensive Metabolic Panel; Future; Expected date: 10/03/2023  -     Hemoglobin A1C; Future; Expected date: 10/03/2023  -     Pneumococcal Conjugate Vaccine (20 Valent) (IM)  -     Ambulatory referral/consult to Optometry; Future; Expected date: 04/10/2023    2. Hypertension associated with type 2 diabetes mellitus  Overview:  - well controlled  - continue current medications    Orders:  -     Comprehensive Metabolic Panel; Future; Expected date: 10/03/2023    3. Class 3 severe obesity due to excess calories with serious comorbidity and body mass index (BMI) of 50.0 to 59.9 in adult  Overview:  - discussed recommendation for diet and cardiovascular exercise  - counseling on lifestyle modifications for risk factor reduction  - counseling on management options  - Matthieu Son denies history of pancreatitis or heavy alcohol use and denies family and person history of thyroid medullary cancer and MENs  - he was doing well on Mounjaro   - with due to availability he to switch back to Trulicity rarely.  Restarted his Mounjaro about 1 week ago and tolerating well. Recommend up titrate to 5 mg weekly in 3 weeks      4. Vaping nicotine dependence, tobacco product  Overview:  - started 18 years old  - 1 pack per day  - 03/2021 reduce to half a pack a day   - 8/2022 quit smoking and vaping only  - recommend quit vaping  - declined smoking cessation program at this time      Other orders  -     tirzepatide (MOUNJARO) 5 mg/0.5 mL PnIj; Inject 5 mg into the skin every 7 days.  Dispense: 4 pen; Refill: 0          ORDERS:   Orders Placed This Encounter    Pneumococcal  Conjugate Vaccine (20 Valent) (IM)    Comprehensive Metabolic Panel    Hemoglobin A1C    Ambulatory referral/consult to Optometry    tirzepatide (MOUNJARO) 5 mg/0.5 mL PnIj       Vaccines recommended: PCV 20 and covid-19 booster    Follow-up in 1 month weight and 6 months DM2  or sooner if any concerns.      This note is dictated using the M*Modal Fluency Direct word recognition program. There are word recognition mistakes that are occasionally missed on review.    Dr. Ebony Collins D.O.   Family Medicine

## 2023-04-04 ENCOUNTER — OFFICE VISIT (OUTPATIENT)
Dept: PRIMARY CARE CLINIC | Facility: CLINIC | Age: 38
End: 2023-04-04
Attending: FAMILY MEDICINE
Payer: COMMERCIAL

## 2023-04-04 VITALS
SYSTOLIC BLOOD PRESSURE: 136 MMHG | HEIGHT: 71 IN | BODY MASS INDEX: 44.1 KG/M2 | WEIGHT: 315 LBS | HEART RATE: 72 BPM | OXYGEN SATURATION: 99 % | DIASTOLIC BLOOD PRESSURE: 84 MMHG

## 2023-04-04 DIAGNOSIS — F17.290 VAPING NICOTINE DEPENDENCE, TOBACCO PRODUCT: ICD-10-CM

## 2023-04-04 DIAGNOSIS — E11.9 TYPE 2 DIABETES MELLITUS WITHOUT COMPLICATION, WITHOUT LONG-TERM CURRENT USE OF INSULIN: Primary | ICD-10-CM

## 2023-04-04 DIAGNOSIS — I15.2 HYPERTENSION ASSOCIATED WITH TYPE 2 DIABETES MELLITUS: ICD-10-CM

## 2023-04-04 DIAGNOSIS — E11.59 HYPERTENSION ASSOCIATED WITH TYPE 2 DIABETES MELLITUS: ICD-10-CM

## 2023-04-04 DIAGNOSIS — E66.01 CLASS 3 SEVERE OBESITY DUE TO EXCESS CALORIES WITH SERIOUS COMORBIDITY AND BODY MASS INDEX (BMI) OF 50.0 TO 59.9 IN ADULT: ICD-10-CM

## 2023-04-04 PROCEDURE — 99999 PR PBB SHADOW E&M-EST. PATIENT-LVL IV: ICD-10-PCS | Mod: PBBFAC,,, | Performed by: FAMILY MEDICINE

## 2023-04-04 PROCEDURE — 99999 PR PBB SHADOW E&M-EST. PATIENT-LVL IV: CPT | Mod: PBBFAC,,, | Performed by: FAMILY MEDICINE

## 2023-04-04 PROCEDURE — 90677 PNEUMOCOCCAL CONJUGATE VACCINE 20-VALENT: ICD-10-PCS | Mod: S$GLB,,, | Performed by: FAMILY MEDICINE

## 2023-04-04 PROCEDURE — 90471 IMMUNIZATION ADMIN: CPT | Mod: S$GLB,,, | Performed by: FAMILY MEDICINE

## 2023-04-04 PROCEDURE — 90677 PCV20 VACCINE IM: CPT | Mod: S$GLB,,, | Performed by: FAMILY MEDICINE

## 2023-04-04 PROCEDURE — 99214 OFFICE O/P EST MOD 30 MIN: CPT | Mod: 25,S$GLB,, | Performed by: FAMILY MEDICINE

## 2023-04-04 PROCEDURE — 90471 PNEUMOCOCCAL CONJUGATE VACCINE 20-VALENT: ICD-10-PCS | Mod: S$GLB,,, | Performed by: FAMILY MEDICINE

## 2023-04-04 PROCEDURE — 99214 PR OFFICE/OUTPT VISIT, EST, LEVL IV, 30-39 MIN: ICD-10-PCS | Mod: 25,S$GLB,, | Performed by: FAMILY MEDICINE

## 2023-04-04 RX ORDER — TIRZEPATIDE 5 MG/.5ML
5 INJECTION, SOLUTION SUBCUTANEOUS
Qty: 4 PEN | Refills: 0 | Status: SHIPPED | OUTPATIENT
Start: 2023-04-04 | End: 2023-06-01 | Stop reason: SDUPTHER

## 2023-04-04 NOTE — PROGRESS NOTES
After obtaining verbal consent from the patient, and per orders of Dr. Collins, injection of prevnar 20 Lot [id number] 8/30/24 given in the RD by SAMUEL SHORT. Patient tolerated well and band aid applied. Patient instructed to remain in clinic for 15 minutes afterwards, and to report any adverse reaction to me immediately.

## 2023-04-11 DIAGNOSIS — I15.2 HYPERTENSION ASSOCIATED WITH TYPE 2 DIABETES MELLITUS: ICD-10-CM

## 2023-04-11 DIAGNOSIS — E11.59 HYPERTENSION ASSOCIATED WITH TYPE 2 DIABETES MELLITUS: ICD-10-CM

## 2023-04-11 RX ORDER — HYDROCHLOROTHIAZIDE 25 MG/1
25 TABLET ORAL DAILY
Qty: 90 TABLET | Refills: 3 | Status: SHIPPED | OUTPATIENT
Start: 2023-04-11 | End: 2023-07-03 | Stop reason: SDUPTHER

## 2023-04-11 NOTE — TELEPHONE ENCOUNTER
Refill Decision Note   Matthieu Sienajones  is requesting a refill authorization.  Brief Assessment and Rationale for Refill:  Approve     Medication Therapy Plan:       Medication Reconciliation Completed: No   Comments:     No Care Gaps recommended.     Note composed:11:58 AM 04/11/2023

## 2023-04-11 NOTE — TELEPHONE ENCOUNTER
No new care gaps identified.  [facility] Embedded Care Gaps. Reference number: [id number]. 4/11/2023   6:39:46 AM YANCIT

## 2023-04-18 ENCOUNTER — PATIENT MESSAGE (OUTPATIENT)
Dept: ADMINISTRATIVE | Facility: HOSPITAL | Age: 38
End: 2023-04-18
Payer: COMMERCIAL

## 2023-06-01 RX ORDER — TIRZEPATIDE 5 MG/.5ML
5 INJECTION, SOLUTION SUBCUTANEOUS
Qty: 12 PEN | Refills: 1 | Status: SHIPPED | OUTPATIENT
Start: 2023-06-01 | End: 2024-02-29 | Stop reason: SDUPTHER

## 2023-06-01 NOTE — TELEPHONE ENCOUNTER
Refill Routing Note   Medication(s) are not appropriate for processing by [facility] for the following reason(s):      New or recently adjusted medication    [facility] action(s):  Defer None identified            Appointments  past 12m or future 3m with PCP    Date Provider   Last Visit   4/4/2023 Ebony Collins, DO   Next Visit   10/4/2023 Ebony Collins, DO   ED visits in past 90 days: 0        Note composed:6:01 PM 06/01/2023

## 2023-06-01 NOTE — TELEPHONE ENCOUNTER
No care due was identified.  [facility] Embedded Care Due Messages. Reference number: [id number].   6/01/2023 4:34:38 PM CDT

## 2023-06-11 ENCOUNTER — PATIENT MESSAGE (OUTPATIENT)
Dept: PRIMARY CARE CLINIC | Facility: CLINIC | Age: 38
End: 2023-06-11
Payer: COMMERCIAL

## 2023-07-02 DIAGNOSIS — E11.65 TYPE 2 DIABETES MELLITUS WITH HYPERGLYCEMIA, WITHOUT LONG-TERM CURRENT USE OF INSULIN: ICD-10-CM

## 2023-07-02 NOTE — TELEPHONE ENCOUNTER
No care due was identified.  Health [facility] Embedded Care Due Messages. Reference number: [id number].   7/02/2023 5:08:36 AM CDT

## 2023-07-03 ENCOUNTER — PATIENT MESSAGE (OUTPATIENT)
Dept: PRIMARY CARE CLINIC | Facility: CLINIC | Age: 38
End: 2023-07-03
Payer: COMMERCIAL

## 2023-07-03 DIAGNOSIS — I15.2 HYPERTENSION ASSOCIATED WITH TYPE 2 DIABETES MELLITUS: ICD-10-CM

## 2023-07-03 DIAGNOSIS — E11.59 HYPERTENSION ASSOCIATED WITH TYPE 2 DIABETES MELLITUS: ICD-10-CM

## 2023-07-03 DIAGNOSIS — E11.65 TYPE 2 DIABETES MELLITUS WITH HYPERGLYCEMIA, WITHOUT LONG-TERM CURRENT USE OF INSULIN: ICD-10-CM

## 2023-07-03 RX ORDER — PRAVASTATIN SODIUM 10 MG/1
10 TABLET ORAL DAILY
Qty: 90 TABLET | Refills: 2 | Status: SHIPPED | OUTPATIENT
Start: 2023-07-03

## 2023-07-03 RX ORDER — LOSARTAN POTASSIUM 50 MG/1
50 TABLET ORAL DAILY
Qty: 90 TABLET | Refills: 1 | Status: SHIPPED | OUTPATIENT
Start: 2023-07-03 | End: 2024-01-19 | Stop reason: SDUPTHER

## 2023-07-03 RX ORDER — PRAVASTATIN SODIUM 10 MG/1
10 TABLET ORAL DAILY
Qty: 90 TABLET | Refills: 2 | Status: SHIPPED | OUTPATIENT
Start: 2023-07-03 | End: 2023-07-03 | Stop reason: SDUPTHER

## 2023-07-03 RX ORDER — HYDROCHLOROTHIAZIDE 25 MG/1
25 TABLET ORAL DAILY
Qty: 90 TABLET | Refills: 3 | Status: SHIPPED | OUTPATIENT
Start: 2023-07-03

## 2023-07-03 NOTE — TELEPHONE ENCOUNTER
No care due was identified.  [facility] Embedded Care Due Messages. Reference number: [id number].   7/03/2023 5:00:02 PM CDT

## 2023-07-03 NOTE — TELEPHONE ENCOUNTER
Refill Decision Note   Matthieu Son  is requesting a refill authorization.  Brief Assessment and Rationale for Refill:  Approve     Medication Therapy Plan:         Comments:     Note composed:4:00 AM 07/03/2023

## 2023-07-13 ENCOUNTER — TELEPHONE (OUTPATIENT)
Dept: PHARMACY | Facility: CLINIC | Age: 38
End: 2023-07-13
Payer: COMMERCIAL

## 2023-09-11 ENCOUNTER — PATIENT MESSAGE (OUTPATIENT)
Dept: PRIMARY CARE CLINIC | Facility: CLINIC | Age: 38
End: 2023-09-11
Payer: COMMERCIAL

## 2023-09-27 ENCOUNTER — PATIENT MESSAGE (OUTPATIENT)
Dept: PRIMARY CARE CLINIC | Facility: CLINIC | Age: 38
End: 2023-09-27
Payer: COMMERCIAL

## 2023-09-27 DIAGNOSIS — E11.9 TYPE 2 DIABETES MELLITUS WITHOUT COMPLICATION, WITHOUT LONG-TERM CURRENT USE OF INSULIN: Primary | ICD-10-CM

## 2023-09-27 DIAGNOSIS — E11.9 TYPE 2 DIABETES MELLITUS WITHOUT COMPLICATION, UNSPECIFIED WHETHER LONG TERM INSULIN USE: ICD-10-CM

## 2023-09-27 NOTE — TELEPHONE ENCOUNTER
[facility] labs ordered    Orders Placed This Encounter    Comprehensive Metabolic Panel    Hemoglobin A1C     Dr. Ebony Collins D.O.   [facility]

## 2023-10-05 ENCOUNTER — PATIENT MESSAGE (OUTPATIENT)
Dept: PRIMARY CARE CLINIC | Facility: CLINIC | Age: 38
End: 2023-10-05
Payer: COMMERCIAL

## 2023-10-12 ENCOUNTER — PATIENT MESSAGE (OUTPATIENT)
Dept: PRIMARY CARE CLINIC | Facility: CLINIC | Age: 38
End: 2023-10-12

## 2023-10-26 ENCOUNTER — OFFICE VISIT (OUTPATIENT)
Dept: URGENT CARE | Facility: CLINIC | Age: 38
End: 2023-10-26
Payer: COMMERCIAL

## 2023-10-26 VITALS
RESPIRATION RATE: 18 BRPM | HEART RATE: 110 BPM | OXYGEN SATURATION: 98 % | SYSTOLIC BLOOD PRESSURE: 115 MMHG | TEMPERATURE: 98 F | BODY MASS INDEX: 44.1 KG/M2 | HEIGHT: 71 IN | WEIGHT: 315 LBS | DIASTOLIC BLOOD PRESSURE: 69 MMHG

## 2023-10-26 DIAGNOSIS — J02.9 SORE THROAT: ICD-10-CM

## 2023-10-26 DIAGNOSIS — J02.0 STREP PHARYNGITIS: Primary | ICD-10-CM

## 2023-10-26 LAB
CTP QC/QA: YES
CTP QC/QA: YES
MOLECULAR STREP A: POSITIVE
SARS-COV-2 AG RESP QL IA.RAPID: NEGATIVE

## 2023-10-26 PROCEDURE — 99213 OFFICE O/P EST LOW 20 MIN: CPT | Mod: S$GLB,,,

## 2023-10-26 PROCEDURE — 87811 SARS CORONAVIRUS 2 ANTIGEN POCT, MANUAL READ: ICD-10-PCS | Mod: QW,S$GLB,,

## 2023-10-26 PROCEDURE — 87651 STREP A DNA AMP PROBE: CPT | Mod: QW,S$GLB,,

## 2023-10-26 PROCEDURE — 87811 SARS-COV-2 COVID19 W/OPTIC: CPT | Mod: QW,S$GLB,,

## 2023-10-26 PROCEDURE — 99213 PR OFFICE/OUTPT VISIT, EST, LEVL III, 20-29 MIN: ICD-10-PCS | Mod: S$GLB,,,

## 2023-10-26 PROCEDURE — 87651 POCT STREP A MOLECULAR: ICD-10-PCS | Mod: QW,S$GLB,,

## 2023-10-26 RX ORDER — PENICILLIN V POTASSIUM 500 MG/1
500 TABLET, FILM COATED ORAL EVERY 12 HOURS
Qty: 20 TABLET | Refills: 0 | Status: SHIPPED | OUTPATIENT
Start: 2023-10-26 | End: 2023-11-05

## 2023-10-26 NOTE — PROGRESS NOTES
"Subjective:      Patient ID: Matthieu Son is a 38 y.o. male.    Vitals:  height is 5' 11" (1.803 m) and weight is 194.7 kg (429 lb 5.5 oz) (abnormal). His oral temperature is 98.1 °F (36.7 °C). His blood pressure is 115/69 and his pulse is 110. His respiration is 18 and oxygen saturation is 98%.     Chief Complaint: Sore Throat    38 y.o. male patient presents with fatigue, chills, body aches and fever that began yesterday. Patient took temperature this morning and ran a fever of 100. Patient took Tylenol this morning at 8 am. Patient states he's been feeling very tired and sleeping all day. Patient has sore throat and hurts to swallow. Endorses nasal congestion and sinus headache. Patient states post auricular tenderness. Patient has been taking Merissa seltzer cold and flu at home. Denies post nasal drip. No sick contacts.             Constitution: Positive for chills and fever.   HENT:  Positive for congestion, sinus pain, sinus pressure, sore throat and trouble swallowing. Negative for ear pain and postnasal drip.    Neck: Negative for neck pain.   Respiratory:  Negative for cough.    Neurological:  Positive for headaches.      Objective:     Physical Exam   Constitutional: He is oriented to person, place, and time. He appears well-developed. He is cooperative.  Non-toxic appearance. He does not appear ill. No distress.      Comments:Patient sits comfortably in exam chair. Answers questions in complete sentences. Does not show any signs of distress or discoloration.        HENT:   Head: Normocephalic and atraumatic.   Ears:   Right Ear: Hearing, tympanic membrane, external ear and ear canal normal.   Left Ear: Hearing, tympanic membrane, external ear and ear canal normal.   Nose: Mucosal edema, rhinorrhea and congestion present. No nasal deformity. No epistaxis. Right sinus exhibits no maxillary sinus tenderness and no frontal sinus tenderness. Left sinus exhibits no maxillary sinus tenderness and no frontal " sinus tenderness.   Mouth/Throat: Uvula is midline and mucous membranes are normal. No trismus in the jaw. Normal dentition. No uvula swelling. Posterior oropharyngeal erythema present. No oropharyngeal exudate or posterior oropharyngeal edema. Tonsils are 3+ on the right. Tonsils are 3+ on the left. No tonsillar exudate.   Eyes: Conjunctivae and lids are normal. No scleral icterus.   Neck: Trachea normal and phonation normal. Neck supple. No edema present. No erythema present. No neck rigidity present. muscular tenderness present.   Cardiovascular: Normal rate, regular rhythm, normal heart sounds and normal pulses.   Pulmonary/Chest: Effort normal and breath sounds normal. No stridor. No respiratory distress. He has no decreased breath sounds. He has no wheezes. He has no rhonchi. He has no rales.   Abdominal: Normal appearance.   Musculoskeletal: Normal range of motion.         General: No deformity. Normal range of motion.      Cervical back: He exhibits tenderness.   Lymphadenopathy:     He has no cervical adenopathy.        Right cervical: No superficial cervical, no deep cervical and no posterior cervical adenopathy present.       Left cervical: No superficial cervical, no deep cervical and no posterior cervical adenopathy present.   Neurological: He is alert and oriented to person, place, and time. He exhibits normal muscle tone. Coordination normal.   Skin: Skin is warm, dry, intact, not diaphoretic and not pale.   Psychiatric: His speech is normal and behavior is normal. Judgment and thought content normal.   Nursing note and vitals reviewed.      Results for orders placed or performed in visit on 10/26/23   SARS Coronavirus 2 Antigen, POCT Manual Read   Result Value Ref Range    SARS Coronavirus 2 Antigen Negative Negative     Acceptable Yes    POCT Strep A, Molecular   Result Value Ref Range    Molecular Strep A, POC Positive (A) Negative     Acceptable Yes        Assessment:      1. Strep pharyngitis    2. Sore throat        Plan:       Strep pharyngitis  -     penicillin v potassium (VEETID) 500 MG tablet; Take 1 tablet (500 mg total) by mouth every 12 (twelve) hours. for 10 days  Dispense: 20 tablet; Refill: 0    Sore throat  -     SARS Coronavirus 2 Antigen, POCT Manual Read  -     POCT Strep A, Molecular      Patient Instructions   You have tested positive for streptococcus pharyngitis today. Make sure to take antibiotics until completed!!  - You can take over the counter ibuprofen or tylenol for throat pain. You can also do warm salt water gargles to help soothe the throat.  - You are no longer considered contagious once you have had antibiotics in your system for 24 hours.   - Make sure to change out your tooth brush once you have been on antibiotics for over 24 hours to prevent reinfecting yourself. Wash any pillow cases you have slept on or masks that you have worn while infected.  - Do not eat or drink after anyone to prevent infecting someone else with strep throat.     - Rest.    - Drink plenty of fluids.    - Acetaminophen (tylenol) or Ibuprofen (advil,motrin) as directed as needed for fever/pain. Avoid tylenol if you have a history of liver disease. Do not take ibuprofen if you have a history of GI bleeding, kidney disease, or if you take blood thinners.   - Ibuprofen dosing for adults: 400 mg by mouth every 4-6 hours as needed. Max: 2400 mg/day; Info: use lowest effective dose, shortest effective treatment duration; give w/ food if GI upset occurs.  - Tylenol dosing for adults: [By mouth route, immediate-release form] Dose: 325-1000 mg by mouth every 4-6h as needed; Max: 1 g/4h and 4 g/day from all sources. [By mouth route, extended-release form] Dose: 650-1300 mg Extended Release by mouth every 8h as needed; Max: 4 g/day from all sources.     - You must understand that you have received an Urgent Care treatment only and that you may be released before all of your medical  problems are known or treated.   - You, the patient, will arrange for follow up care as instructed.   - If your condition worsens or fails to improve we recommend that you receive another evaluation at the ER immediately or contact your PCP to discuss your concerns or return here.   - Follow up with your PCP or specialty clinic as directed in the next 1-2 weeks if not improved or as needed.  You can call [phone] to schedule an appointment with the appropriate provider.    If your symptoms do not improve or worsen, go to the emergency room immediately.

## 2023-10-26 NOTE — PROGRESS NOTES
"Subjective:      Patient ID: Matthieu Son is a 38 y.o. male.    Vitals:  height is 5' 11" (1.803 m) and weight is 194.7 kg (429 lb 5.5 oz) (abnormal). His oral temperature is 98.1 °F (36.7 °C). His blood pressure is 115/69 and his pulse is 110. His respiration is 18 and oxygen saturation is 98%.     Chief Complaint: Sore Throat    Sore Throat   This is a new problem. The current episode started yesterday. The problem has been gradually worsening. The maximum temperature recorded prior to his arrival was 100.4 - 100.9 F. The pain is at a severity of 6/10. The pain is moderate. Associated symptoms include congestion (mild), coughing (mild), headaches (with sinus pressure), neck pain, swollen glands and trouble swallowing. Pertinent negatives include no abdominal pain, diarrhea, drooling, ear discharge, ear pain, hoarse voice, plugged ear sensation, shortness of breath, stridor or vomiting. Associated symptoms comments: Positive for: chills, body aches. Treatments tried: tylenol and alkaseltzer cold plus. The treatment provided mild relief.       HENT:  Positive for congestion (mild), sore throat and trouble swallowing. Negative for ear pain, ear discharge and drooling.    Neck: Positive for neck pain.   Respiratory:  Positive for cough (mild). Negative for shortness of breath and stridor.    Gastrointestinal:  Negative for abdominal pain, vomiting and diarrhea.   Neurological:  Positive for headaches (with sinus pressure).      Objective:     Physical Exam    Assessment:     1. Sore throat        Plan:       Sore throat  -     SARS Coronavirus 2 Antigen, POCT Manual Read  -     POCT Strep A, Molecular                    "

## 2023-10-26 NOTE — PATIENT INSTRUCTIONS
You have tested positive for streptococcus pharyngitis today. Make sure to take antibiotics until completed!!  - You can take over the counter ibuprofen or tylenol for throat pain. You can also do warm salt water gargles to help soothe the throat.  - You are no longer considered contagious once you have had antibiotics in your system for 24 hours.   - Make sure to change out your tooth brush once you have been on antibiotics for over 24 hours to prevent reinfecting yourself. Wash any pillow cases you have slept on or masks that you have worn while infected.  - Do not eat or drink after anyone to prevent infecting someone else with strep throat.     - Rest.    - Drink plenty of fluids.    - Acetaminophen (tylenol) or Ibuprofen (advil,motrin) as directed as needed for fever/pain. Avoid tylenol if you have a history of liver disease. Do not take ibuprofen if you have a history of GI bleeding, kidney disease, or if you take blood thinners.   - Ibuprofen dosing for adults: 400 mg by mouth every 4-6 hours as needed. Max: 2400 mg/day; Info: use lowest effective dose, shortest effective treatment duration; give w/ food if GI upset occurs.  - Tylenol dosing for adults: [By mouth route, immediate-release form] Dose: 325-1000 mg by mouth every 4-6h as needed; Max: 1 g/4h and 4 g/day from all sources. [By mouth route, extended-release form] Dose: 650-1300 mg Extended Release by mouth every 8h as needed; Max: 4 g/day from all sources.     - You must understand that you have received an Urgent Care treatment only and that you may be released before all of your medical problems are known or treated.   - You, the patient, will arrange for follow up care as instructed.   - If your condition worsens or fails to improve we recommend that you receive another evaluation at the ER immediately or contact your PCP to discuss your concerns or return here.   - Follow up with your PCP or specialty clinic as directed in the next 1-2 weeks if  not improved or as needed.  You can call [phone] to schedule an appointment with the appropriate provider.    If your symptoms do not improve or worsen, go to the emergency room immediately.

## 2023-11-10 ENCOUNTER — OFFICE VISIT (OUTPATIENT)
Dept: URGENT CARE | Facility: CLINIC | Age: 38
End: 2023-11-10
Payer: COMMERCIAL

## 2023-11-10 VITALS
BODY MASS INDEX: 44.1 KG/M2 | WEIGHT: 315 LBS | OXYGEN SATURATION: 96 % | SYSTOLIC BLOOD PRESSURE: 126 MMHG | HEIGHT: 71 IN | TEMPERATURE: 98 F | RESPIRATION RATE: 18 BRPM | DIASTOLIC BLOOD PRESSURE: 79 MMHG | HEART RATE: 99 BPM

## 2023-11-10 DIAGNOSIS — J02.0 RECURRENT STREPTOCOCCAL PHARYNGITIS: Primary | ICD-10-CM

## 2023-11-10 PROCEDURE — 99213 PR OFFICE/OUTPT VISIT, EST, LEVL III, 20-29 MIN: ICD-10-PCS | Mod: S$GLB,,, | Performed by: PHYSICIAN ASSISTANT

## 2023-11-10 PROCEDURE — 99213 OFFICE O/P EST LOW 20 MIN: CPT | Mod: S$GLB,,, | Performed by: PHYSICIAN ASSISTANT

## 2023-11-10 RX ORDER — AMOXICILLIN AND CLAVULANATE POTASSIUM 875; 125 MG/1; MG/1
1 TABLET, FILM COATED ORAL EVERY 12 HOURS
Qty: 20 TABLET | Refills: 0 | Status: SHIPPED | OUTPATIENT
Start: 2023-11-10 | End: 2023-11-20

## 2023-11-10 NOTE — PATIENT INSTRUCTIONS
Pharyngitis   If your condition worsens or fails to improve we recommend that you receive another evaluation at the urgent care/ER immediately or contact your PCP to discuss your concerns. You must understand that you've received an urgent care treatment only and that you may be released before all your medical problems are known or treated. You the patient will arrange for followup care as instructed.     Tylenol or ibuprofen for pain may help as long as you are not allergic to these meds or have a medical condition such as stomach ulcers, liver or kidney disease or taking blood thinners etc that would   prevent you from using these medications.   Rest and fluids will help as well.   Warm saltwater gargles, warm tea with honey and Chloraseptic spray as needed for sore throat.  If your symptoms do not improve in 2-3 days please return for evaluation

## 2023-11-10 NOTE — PROGRESS NOTES
"Subjective:      Patient ID: Matthieu Son is a 38 y.o. male.    Vitals:  height is 5' 11" (1.803 m) and weight is 194.6 kg (429 lb) (abnormal). His temperature is 97.8 °F (36.6 °C). His blood pressure is 126/79 and his pulse is 99. His respiration is 18 and oxygen saturation is 96%.     Chief Complaint: Sore Throat    Pt is complaining of having strep last week and he finished his penicillin four days ago. He started feeling better as he took the antibiotic but he woke up this morning and the sore throat is even worse than it was before.     Sore Throat   This is a recurrent problem. The current episode started today. The problem has been gradually worsening. Associated symptoms include swollen glands and trouble swallowing. Pertinent negatives include no abdominal pain, congestion, coughing, diarrhea, drooling, ear discharge, ear pain, headaches, hoarse voice, plugged ear sensation, neck pain, shortness of breath, stridor or vomiting.       Constitution: Negative for chills, fatigue and fever.   HENT:  Positive for postnasal drip, sore throat and trouble swallowing. Negative for ear pain, ear discharge, tinnitus, hearing loss, dental problem, drooling, mouth sores, tongue pain, tongue lesion, congestion, sinus pain, sinus pressure and voice change.    Neck: Negative for neck pain, neck stiffness and painful lymph nodes.   Cardiovascular:  Negative for chest pain and sob on exertion.   Eyes:  Negative for eye discharge and eye itching.   Respiratory:  Negative for chest tightness, cough, shortness of breath and stridor.    Gastrointestinal:  Negative for abdominal pain, nausea, vomiting, constipation and diarrhea.   Musculoskeletal:  Negative for muscle cramps and muscle ache.   Skin:  Negative for rash.   Neurological:  Negative for dizziness, headaches and altered mental status.   Hematologic/Lymphatic: Negative for swollen lymph nodes.   Psychiatric/Behavioral:  Negative for altered mental status.       Past " Medical History:   Diagnosis Date    Diabetes mellitus, type 2     Hypertension     Venous stasis ulcer of calf limited to breakdown of skin with varicose veins 6/28/2019    Venous ulcers of both lower extremities 5/29/2019       Past Surgical History:   Procedure Laterality Date    WISDOM TOOTH EXTRACTION         Family History   Problem Relation Age of Onset    Diabetes Mother     Hypertension Mother     Breast cancer Mother     Hyperlipidemia Mother     Cancer Mother         breast cancer    Diabetes Father     Hypertension Father     Hyperlipidemia Father     Peripheral vascular disease Father     Heart attack Father     No Known Problems Sister     No Known Problems Son     Colon cancer Maternal Grandmother     Heart disease Maternal Grandfather     No Known Problems Paternal Grandmother     No Known Problems Paternal Grandfather        Social History     Socioeconomic History    Marital status:      Spouse name: Kendra   Occupational History     Employer: [employer]   Tobacco Use    Smoking status: Every Day     Types: Vaping with nicotine     Passive exposure: Current    Smokeless tobacco: Never   Substance and Sexual Activity    Alcohol use: Yes     Comment: social    Drug use: Never    Sexual activity: Yes     Partners: Female   Social History Narrative     to Kendra. 1 son (2023 6 years old); [place].  and manager [employer] Occasional social alcohol use. Smoker since 19 yo about 1 ppd.  03/2021 reports that he decreased his smoking to half a pack a day.  Denies illicit drugs.     Social Determinants of Health     Financial Resource Strain: Low Risk  (5/17/2023)    Overall Financial Resource Strain (CARDIA)     Difficulty of Paying Living Expenses: Not hard at all   Food Insecurity: No Food Insecurity (5/17/2023)    Hunger Vital Sign     Worried About Running Out of Food in the Last Year: Never true     Ran Out of Food in the Last Year: Never true   Transportation Needs:  No Transportation Needs (5/17/2023)    PRAPARE - Transportation     Lack of Transportation (Medical): No     Lack of Transportation (Non-Medical): No   Physical Activity: Insufficiently Active (5/17/2023)    Exercise Vital Sign     Days of Exercise per Week: 2 days     Minutes of Exercise per Session: 20 min   Stress: No Stress Concern Present (5/17/2023)    [employer] [place] [employer] - Occupational Stress Questionnaire     Feeling of Stress : Not at all   Social Connections: Unknown (5/17/2023)    Social Connection and Isolation Panel [NHANES]     Frequency of Communication with Friends and Family: Twice a week     Frequency of Social Gatherings with Friends and Family: Three times a week     Active Member of Clubs or Organizations: No     Attends Club or Organization Meetings: Never     Marital Status:    Housing Stability: Unknown (5/17/2023)    Housing Stability Vital Sign     Unable to Pay for Housing in the Last Year: No     Unstable Housing in the Last Year: No       Current Outpatient Medications   Medication Sig Dispense Refill    aspirin (ECOTRIN) 81 MG EC tablet Take 81 mg by mouth once daily.      hydroCHLOROthiazide (HYDRODIURIL) 25 MG tablet Take 1 tablet (25 mg total) by mouth once daily. 90 tablet 3    losartan (COZAAR) 50 MG tablet Take 1 tablet (50 mg total) by mouth once daily. 90 tablet 1    multivit-minerals/folic acid (ONE-A-DAY MEN VITACRAVES ORAL) Take 1 tablet by mouth once daily.      pravastatin (PRAVACHOL) 10 MG tablet Take 1 tablet (10 mg total) by mouth once daily. 90 tablet 2    tirzepatide (MOUNJARO) 5 mg/0.5 mL PnIj Inject 5 mg into the skin every 7 days. 12 pen 1    TRUE METRIX GLUCOSE METER Misc USE UTD  0    TRUEPLUS LANCETS 33 gauge Misc CHECK BLOOD SUGAR QD  3    amoxicillin-clavulanate 875-125mg (AUGMENTIN) 875-125 mg per tablet Take 1 tablet by mouth every 12 (twelve) hours. for 10 days 20 tablet 0     No current facility-administered medications for this  visit.       Review of patient's allergies indicates:  No Known Allergies   Objective:     Physical Exam   Constitutional: He is oriented to person, place, and time. He appears well-developed. He is cooperative.  Non-toxic appearance. He does not appear ill. No distress.   HENT:   Head: Normocephalic and atraumatic.   Ears:   Right Ear: Hearing, tympanic membrane, external ear and ear canal normal. impacted cerumen  Left Ear: Hearing, tympanic membrane, external ear and ear canal normal. impacted cerumen  Nose: Nose normal. No mucosal edema, rhinorrhea, nasal deformity or congestion. No epistaxis. Right sinus exhibits no maxillary sinus tenderness and no frontal sinus tenderness. Left sinus exhibits no maxillary sinus tenderness and no frontal sinus tenderness.   Mouth/Throat: Uvula is midline and mucous membranes are normal. Mucous membranes are moist. No trismus in the jaw. Normal dentition. No uvula swelling. Oropharyngeal exudate and posterior oropharyngeal erythema present. No posterior oropharyngeal edema.   Eyes: Conjunctivae and lids are normal. Right eye exhibits no discharge. Left eye exhibits no discharge. No scleral icterus.   Neck: Trachea normal and phonation normal. Neck supple. No edema present. No erythema present. No neck rigidity present.   Cardiovascular: Normal rate, regular rhythm and normal heart sounds.   No murmur heard.Exam reveals no gallop and no friction rub.   Pulmonary/Chest: Effort normal and breath sounds normal. No stridor. No respiratory distress. He has no decreased breath sounds. He has no wheezes. He has no rhonchi. He has no rales.   Abdominal: Normal appearance.   Musculoskeletal:      Cervical back: He exhibits tenderness.   Lymphadenopathy:     He has cervical adenopathy.   Neurological: He is alert and oriented to person, place, and time. He displays no weakness. He exhibits normal muscle tone. Coordination normal.   Skin: Skin is warm, dry, intact, not diaphoretic, not  pale and no rash.   Psychiatric: His speech is normal and behavior is normal. Mood, judgment and thought content normal.   Nursing note and vitals reviewed.      Assessment:     1. Recurrent streptococcal pharyngitis        Plan:       Recurrent streptococcal pharyngitis  -     amoxicillin-clavulanate 875-125mg (AUGMENTIN) 875-125 mg per tablet; Take 1 tablet by mouth every 12 (twelve) hours. for 10 days  Dispense: 20 tablet; Refill: 0    Results reviewed  I have reviewed the patient chart and pertinent past imaging/labs.  Patient Instructions                                                         Pharyngitis   If your condition worsens or fails to improve we recommend that you receive another evaluation at the urgent care/ER immediately or contact your PCP to discuss your concerns. You must understand that you've received an urgent care treatment only and that you may be released before all your medical problems are known or treated. You the patient will arrange for followup care as instructed.     Tylenol or ibuprofen for pain may help as long as you are not allergic to these meds or have a medical condition such as stomach ulcers, liver or kidney disease or taking blood thinners etc that would   prevent you from using these medications.   Rest and fluids will help as well.   Warm saltwater gargles, warm tea with honey and Chloraseptic spray as needed for sore throat.  If your symptoms do not improve in 2-3 days please return for evaluation

## 2023-12-29 ENCOUNTER — PATIENT MESSAGE (OUTPATIENT)
Dept: ADMINISTRATIVE | Facility: HOSPITAL | Age: 38
End: 2023-12-29
Payer: COMMERCIAL

## 2024-01-08 ENCOUNTER — OFFICE VISIT (OUTPATIENT)
Dept: URGENT CARE | Facility: CLINIC | Age: 39
End: 2024-01-08
Payer: COMMERCIAL

## 2024-01-08 VITALS
DIASTOLIC BLOOD PRESSURE: 87 MMHG | RESPIRATION RATE: 16 BRPM | BODY MASS INDEX: 59.83 KG/M2 | HEART RATE: 90 BPM | OXYGEN SATURATION: 97 % | WEIGHT: 315 LBS | TEMPERATURE: 98 F | SYSTOLIC BLOOD PRESSURE: 138 MMHG

## 2024-01-08 DIAGNOSIS — J02.0 STREP PHARYNGITIS: Primary | ICD-10-CM

## 2024-01-08 DIAGNOSIS — J02.9 SORE THROAT: ICD-10-CM

## 2024-01-08 LAB
CTP QC/QA: YES
CTP QC/QA: YES
MOLECULAR STREP A: POSITIVE
SARS-COV-2 AG RESP QL IA.RAPID: NEGATIVE

## 2024-01-08 PROCEDURE — 87811 SARS-COV-2 COVID19 W/OPTIC: CPT | Mod: QW,S$GLB,, | Performed by: FAMILY MEDICINE

## 2024-01-08 PROCEDURE — 99214 OFFICE O/P EST MOD 30 MIN: CPT | Mod: S$GLB,,, | Performed by: FAMILY MEDICINE

## 2024-01-08 PROCEDURE — 87651 STREP A DNA AMP PROBE: CPT | Mod: QW,S$GLB,, | Performed by: FAMILY MEDICINE

## 2024-01-08 RX ORDER — AMOXICILLIN AND CLAVULANATE POTASSIUM 875; 125 MG/1; MG/1
1 TABLET, FILM COATED ORAL 2 TIMES DAILY
Qty: 20 TABLET | Refills: 0 | Status: SHIPPED | OUTPATIENT
Start: 2024-01-08 | End: 2024-01-18

## 2024-01-08 NOTE — PATIENT INSTRUCTIONS
IT IS RECOMMENDED THAT YOUR OTHER FAMILY MEMBERS BE TESTED AS WELL FOR STREP.    Make sure that you follow up with your primary care doctor in the next 2-5 days if needed .  Go to or return to Urgent Care if signs or symptoms change and certainly if you have worsening and/or severe symptoms go to the nearest emergency department for further evaluation.

## 2024-01-08 NOTE — PROGRESS NOTES
Subjective:      Patient ID: Matthieu Son is a 38 y.o. male.    Vitals:  weight is 194.6 kg (429 lb) (abnormal). His oral temperature is 98.4 °F (36.9 °C). His blood pressure is 138/87 and his pulse is 90. His respiration is 16 and oxygen saturation is 97%.     Chief Complaint: Sore Throat (/)    38-year-old male with history of recurrent strep who presents with sore throat, exactly reminiscent of strep in the past.  He was seen in late October with positive molecular strep test and treated appropriately with penicillin and improvement of his symptoms.  However 4 days later he had a recurrence of sore throat and was prescribed Augmentin.  Asymptomatic since that time.  Now started last night again with sore throat and right ear pressure.  No fever.      Home Tx:  None     PMH: Had strep 2 months ago, hypertension, diabetes      Sore Throat   The current episode started yesterday. The problem has been gradually worsening. There has been no fever. The pain is at a severity of 4/10. Associated symptoms include ear pain. Pertinent negatives include no abdominal pain, congestion, coughing, headaches, plugged ear sensation, shortness of breath or trouble swallowing. He has had no exposure to strep or mono.       HENT:  Positive for ear pain and sore throat. Negative for congestion and trouble swallowing.    Respiratory:  Negative for cough and shortness of breath.    Gastrointestinal:  Negative for abdominal pain.   Neurological:  Negative for headaches.      Objective:     Physical Exam   Constitutional: He is oriented to person, place, and time. He appears well-developed. No distress.   HENT:   Head: Normocephalic and atraumatic.   Ears:   Right Ear: External ear normal.   Left Ear: External ear normal.      Comments: Bilateral TMs with serous changes, no erythema  Mouth/Throat: Posterior oropharyngeal erythema present.      Comments: 3+ tonsils with erythema and scant exudate.   Eyes: Conjunctivae and EOM are normal.  Pupils are equal, round, and reactive to light.   Cardiovascular: Normal rate, regular rhythm and normal heart sounds.   Pulmonary/Chest: Effort normal and breath sounds normal. No stridor. No respiratory distress. He has no wheezes. He has no rhonchi. He has no rales.   Lymphadenopathy:     He has cervical adenopathy.   Neurological: He is alert and oriented to person, place, and time.   Skin: Skin is warm, dry and not diaphoretic.   Psychiatric: His behavior is normal. Judgment and thought content normal.   Vitals reviewed.    Results for orders placed or performed in visit on 01/08/24   SARS Coronavirus 2 Antigen, POCT Manual Read   Result Value Ref Range    SARS Coronavirus 2 Antigen Negative Negative     Acceptable Yes    POCT Strep A, Molecular   Result Value Ref Range    Molecular Strep A, POC Positive (A) Negative     Acceptable Yes       Assessment:     1. Strep pharyngitis    2. Sore throat      I have reviewed the patient's previous visits, labs, and images to look for any trends or previous treatments as part of this assessment.   Plan:       Strep pharyngitis  -     amoxicillin-clavulanate 875-125mg (AUGMENTIN) 875-125 mg per tablet; Take 1 tablet by mouth 2 (two) times daily. for 10 days  Dispense: 20 tablet; Refill: 0    Sore throat  -     SARS Coronavirus 2 Antigen, POCT Manual Read  -     POCT Strep A, Molecular  -     (Magic mouthwash) 1:1:1 diphenhydrAMINE(Benadryl) 12.5mg/5ml liq, aluminum & magnesium hydroxide-simethicone (Maalox), LIDOcaine viscous 2%; Swish and spit 10 mLs every 4 (four) hours as needed (Sore throat).  Dispense: 90 mL; Refill: 0    IT IS RECOMMENDED THAT YOUR OTHER FAMILY MEMBERS BE TESTED AS WELL FOR STREP.    Make sure that you follow up with your primary care doctor in the next 2-5 days if needed .  Go to or return to Urgent Care if signs or symptoms change and certainly if you have worsening and/or severe symptoms go to the nearest emergency  department for further evaluation.

## 2024-01-19 DIAGNOSIS — E11.59 HYPERTENSION ASSOCIATED WITH TYPE 2 DIABETES MELLITUS: ICD-10-CM

## 2024-01-19 DIAGNOSIS — I15.2 HYPERTENSION ASSOCIATED WITH TYPE 2 DIABETES MELLITUS: ICD-10-CM

## 2024-01-19 RX ORDER — LOSARTAN POTASSIUM 50 MG/1
50 TABLET ORAL DAILY
Qty: 90 TABLET | Refills: 0 | Status: SHIPPED | OUTPATIENT
Start: 2024-01-19 | End: 2024-04-23 | Stop reason: SDUPTHER

## 2024-01-19 NOTE — TELEPHONE ENCOUNTER
Provider Staff:  Action required for this patient    Requires labs  [CMP, Lipid panel due 3.25.2024]     Please see care gap opportunities below in Care Due Message.    Thanks!  [facility]     Appointments      Date Provider   Last Visit   4/4/2023 Ebony Collins, DO   Next Visit   Visit date not found Ebony Collins, DO     Refill Decision Note   Matthieu Son  is requesting a refill authorization.  Brief Assessment and Rationale for Refill:  Approve     Medication Therapy Plan:  CMP, Lipid panel due 3.25.2024      Comments:     Note composed:12:19 PM 01/19/2024

## 2024-01-19 NOTE — TELEPHONE ENCOUNTER
Care Due:                  Date            Visit Type   Department     Provider  --------------------------------------------------------------------------------                                EP -                              PRIMARY      [facility] PRIMARY  Last Visit: 04-      CARE ([facility])   TRENTON Collins  Next Visit: None Scheduled  None         None Found                                                            Last  Test          Frequency    Reason                     Performed    Due Date  --------------------------------------------------------------------------------    CMP.........  12 months..  hydroCHLOROthiazide,       03- 03-                             losartan, pravastatin....    Lipid Panel.  12 months..  pravastatin..............  03- 03-    [employer] Embedded Care Due Messages. Reference number: [id number].   1/19/2024 5:08:18 AM CST

## 2024-02-28 ENCOUNTER — PATIENT MESSAGE (OUTPATIENT)
Dept: PRIMARY CARE CLINIC | Facility: CLINIC | Age: 39
End: 2024-02-28
Payer: COMMERCIAL

## 2024-02-28 DIAGNOSIS — E11.9 TYPE 2 DIABETES MELLITUS WITHOUT COMPLICATION, WITHOUT LONG-TERM CURRENT USE OF INSULIN: Primary | ICD-10-CM

## 2024-02-29 RX ORDER — TIRZEPATIDE 5 MG/.5ML
5 INJECTION, SOLUTION SUBCUTANEOUS
Qty: 4 PEN | Refills: 0 | Status: SHIPPED | OUTPATIENT
Start: 2024-02-29 | End: 2024-03-30

## 2024-02-29 NOTE — TELEPHONE ENCOUNTER
Please notify pt. The patient's last visit with Ebony Higginbotham DO was on 4/4/2023.    Medications have been refilled for 30 days   Matthieu Wrenjones is due for an appointment prior to further refills.   Labs are also due and have been ordered. Recommend labs be done at least 2-7 days before visit and recommend schedule. [facility] for labs.   Please schedule follow-up and labs prior to visit. Okay for virtual    Orders Placed This Encounter    Lipid Panel    Hemoglobin A1C    Microalbumin/Creatinine Ratio, Urine    Comprehensive Metabolic Panel    CBC Auto Differential    tirzepatide (MOUNJARO) 5 mg/0.5 mL PnIj       Dr. Ebony Collins D.O.   [facility]

## 2024-02-29 NOTE — TELEPHONE ENCOUNTER
Refill Encounter    PCP Visits: Recent Visits  Date Type Provider Dept   04/04/23 Office Visit Ebony Collins DO [facility]   Showing recent visits within past 360 days and meeting all other requirements  Future Appointments  No visits were found meeting these conditions.  Showing future appointments within next 720 days and meeting all other requirements     Last 3 Blood Pressure:   BP Readings from Last 3 Encounters:   01/08/24 138/87   11/10/23 126/79   10/26/23 115/69     Preferred Pharmacy:   [facility]  [address]  [place] [address]  Phone: [phone] Fax: [phone]    [id number] - [place] - [address]  [address]  [address] [place] [address]  Phone: [phone] Fax: [phone]    Requested RX:  Requested Prescriptions     Pending Prescriptions Disp Refills    tirzepatide (MOUNJARO) 5 mg/0.5 mL PnIj 12 pen  1     Sig: Inject 5 mg into the skin every 7 days.      RX Route: Normal

## 2024-02-29 NOTE — TELEPHONE ENCOUNTER
No care due was identified.  [facility] Embedded Care Due Messages. Reference number: [id number].   2/29/2024 9:05:25 AM CST

## 2024-03-29 ENCOUNTER — PATIENT MESSAGE (OUTPATIENT)
Dept: ADMINISTRATIVE | Facility: HOSPITAL | Age: 39
End: 2024-03-29
Payer: COMMERCIAL

## 2024-05-23 DIAGNOSIS — E11.65 TYPE 2 DIABETES MELLITUS WITH HYPERGLYCEMIA, WITHOUT LONG-TERM CURRENT USE OF INSULIN: ICD-10-CM

## 2024-05-23 DIAGNOSIS — I15.2 HYPERTENSION ASSOCIATED WITH TYPE 2 DIABETES MELLITUS: ICD-10-CM

## 2024-05-23 DIAGNOSIS — E11.59 HYPERTENSION ASSOCIATED WITH TYPE 2 DIABETES MELLITUS: ICD-10-CM

## 2024-05-23 RX ORDER — LOSARTAN POTASSIUM 50 MG/1
50 TABLET ORAL DAILY
Qty: 30 TABLET | Refills: 0 | Status: SHIPPED | OUTPATIENT
Start: 2024-05-23 | End: 2024-06-11 | Stop reason: SDUPTHER

## 2024-05-23 RX ORDER — PRAVASTATIN SODIUM 10 MG/1
10 TABLET ORAL DAILY
Qty: 30 TABLET | Refills: 0 | Status: SHIPPED | OUTPATIENT
Start: 2024-05-23 | End: 2024-06-11 | Stop reason: SDUPTHER

## 2024-05-23 NOTE — TELEPHONE ENCOUNTER
Refill Routing Note   Medication(s) are not appropriate for processing by [facility] for the following reason(s):        Required labs outdated    [facility] action(s):  Defer        Medication Therapy Plan: FOVS but no labs scheduled      Appointments  past 12m or future 3m with PCP    Date Provider   Last Visit   4/4/2023 Ebony Collins, DO   Next Visit   6/11/2024 Ebony Collins, DO   ED visits in past 90 days: 0        Note composed:10:31 AM 05/23/2024

## 2024-05-23 NOTE — TELEPHONE ENCOUNTER
No care due was identified.  [facility] Embedded Care Due Messages. Reference number: [id number].   5/23/2024 5:08:30 AM CDT

## 2024-05-24 ENCOUNTER — PATIENT MESSAGE (OUTPATIENT)
Dept: PRIMARY CARE CLINIC | Facility: CLINIC | Age: 39
End: 2024-05-24
Payer: COMMERCIAL

## 2024-05-24 DIAGNOSIS — L73.9 FOLLICULITIS: Primary | ICD-10-CM

## 2024-05-24 RX ORDER — MUPIROCIN 20 MG/G
OINTMENT TOPICAL 2 TIMES DAILY
Qty: 15 G | Refills: 0 | Status: SHIPPED | OUTPATIENT
Start: 2024-05-24 | End: 2024-06-11

## 2024-05-28 ENCOUNTER — PATIENT OUTREACH (OUTPATIENT)
Dept: ADMINISTRATIVE | Facility: HOSPITAL | Age: 39
End: 2024-05-28
Payer: COMMERCIAL

## 2024-05-30 ENCOUNTER — PATIENT OUTREACH (OUTPATIENT)
Dept: ADMINISTRATIVE | Facility: HOSPITAL | Age: 39
End: 2024-05-30
Payer: COMMERCIAL

## 2024-05-30 NOTE — PROGRESS NOTES
Health Maintenance Due   Topic Date Due    Eye Exam  09/01/2022    COVID-19 Vaccine (2 - 2023-24 season) 09/01/2023    Hemoglobin A1c  09/30/2023    Diabetes Urine Screening  03/30/2024    Lipid Panel  03/30/2024    Foot Exam  04/04/2024    Health Maintenance reviewed, updated and links triggered. [employer] due portal message sent for scheduling.   ([place]) 5/30/24

## 2024-06-10 NOTE — PROGRESS NOTES
FAMILY MEDICINE  [facility]    Reason for visit:   Chief Complaint   Patient presents with    Annual Exam    Diabetes    Sleep Apnea    Hypertension         SUBJECTIVE: Matthieu Son is a 39 y.o. male  - smoker (started 18 years old 1 pack per day. 10/2022 changed to vaping) with morbid obesity, venous insufficiency with history lower extremity venous stasis ulcer that have healed, hypertension, and type 2 diabetes presents for follow-up diabetes and hypertension and concerns for sleep apnea     The patient's last visit with me was on 4/4/2023. His wife was available via phone. Matthieu Son  and wife reports that he is started to have increased urination about 2-3 months ago.  He also had a sweet smell to his breath.  He has not been taking his metformin since his A1c has been doing well his last visit 04/04/2023.  He has been taking his Mounjaro 5 mg weekly.  He reports about a month ago he was instructed make an appointment and had not had any refills.  He reports that he has been spacing out the medication to every other week.  His last dose was this week.  He is tolerating medication well.  However it appears his diabetes has progressed.  He has not been monitoring his glucose levels.  He reports he ran out of test strips     He also has concerns for sleep apnea.  He has snoring.  He has episodes of witnessed apnea.  His sleep study was ordered in 2019 but he opted not done.  His wife is encouraging to him to get a sleep study.    1. Diabetes Type 2     Age diagnosed: 33 yo     Current treatment regimen:   Mounjaro 5 mg weekly    Prior medication:  metFORMIN (GLUCOPHAGE-XR) 500 MG ER 24hr tablet, Take 1 tablet (500 mg total) by mouth 2 (two) times daily with meals  - discontinue 9/2021 visit since A1c had improved to 5.5%.  He opted to stay on Trulicity since have been helpful for his weight loss  Trulicity ( availability)     Side effects from treatment: denies  Complications of  diabetes:  hyperglycemia     Glucometer: yes  Glucose monitoring: not monitoring    POC glucose >400 mg/dL 6/11/24    A1C pending    Lab Results       Component                Value               Date                       HGBA1C                   5.9 (H)             03/30/2023               Lab Results       Component                Value               Date                       HGBA1C                   5.6                 09/30/2022                      Low-dose statin:  Pravastatin  Last eye exam: 9/1/21 due and discussed at visit. Referral to [facility]. Eye exam ordered 6/11/24  Last foot exam: 06/11/2024     Wt Readings from Last 10 Encounters:  06/11/24 : (!) 177.3 kg (390 lb 14 oz)  01/08/24 : (!) 194.6 kg (429 lb)  11/10/23 : (!) 194.6 kg (429 lb)  10/26/23 : (!) 194.7 kg (429 lb 5.5 oz)  04/04/23 : (!) 194.7 kg (429 lb 5.5 oz)  10/03/22 : (!) 188.5 kg (415 lb 9.6 oz)  08/13/22 : (!) 174.6 kg (385 lb)  03/28/22 : (!) 175 kg (385 lb 12.9 oz)  03/29/21 : (!) 187.8 kg (414 lb)  11/27/19 : 117.8 kg (259 lb 9.6 oz)      2. Hypertension  - daibetes  - BP goal < 130/80    Current medication treatment:   hydroCHLOROthiazide (HYDRODIURIL) 25 MG tablet, Take 1 tablet (25 mg total) by mouth once daily., Disp: 90 tablet, Rfl: 3  losartan (COZAAR) 50 MG tablet, Take 1 tablet (50 mg total) by mouth once daily., Disp: 90 tablet, Rfl: 1    Medication side effects: no medication side effects noted  taking medications as instructed, no medication side effects noted, no TIAs, no chest pain on exertion, no dyspnea on exertion, no swelling of ankles    Exercise regimen: active at work    Home BP cuff: Yes  How often does patient monitoring BP? PRN                Review of Systems   All other systems reviewed and are negative.      HEALTH MAINTENANCE:   Health Maintenance   Topic Date Due    Eye Exam  09/01/2022    Hemoglobin A1c  09/30/2023    Lipid Panel  03/30/2024    Foot Exam  06/11/2025    TETANUS VACCINE   08/18/2026    Hepatitis C Screening  Completed     Health Maintenance Topics with due status: Not Due       Topic Last Completion Date    TETANUS VACCINE 08/18/2016    Influenza Vaccine 10/03/2022    Foot Exam 06/11/2024     Health Maintenance Due   Topic Date Due    Eye Exam  09/01/2022    COVID-19 Vaccine (2 - 2023-24 season) 09/01/2023    Hemoglobin A1c  09/30/2023    Diabetes Urine Screening  03/30/2024    Lipid Panel  03/30/2024       HISTORY:   Past Medical History:   Diagnosis Date    Diabetes mellitus, type 2     Hypertension     Venous stasis ulcer of calf limited to breakdown of skin with varicose veins 6/28/2019    Venous ulcers of both lower extremities 5/29/2019       Past Surgical History:   Procedure Laterality Date    WISDOM TOOTH EXTRACTION         Family History   Problem Relation Name Age of Onset    Diabetes Mother Meghan Son     Hypertension Mother Meghan Son     Breast cancer Mother Meghan Son     Hyperlipidemia Mother Meghan Son     Cancer Mother Meghan Son         breast cancer    Diabetes Father Michael Son     Hypertension Father Michael Son     Hyperlipidemia Father Michael Son     Peripheral vascular disease Father Michael Son     Heart attack Father Michael Son     No Known Problems Sister      No Known Problems Son      Colon cancer Maternal Grandmother      Heart disease Maternal Grandfather Wiliam Carrera     No Known Problems Paternal Grandmother      No Known Problems Paternal Grandfather         Social History     Tobacco Use    Smoking status: Every Day     Current packs/day: 0.50     Average packs/day: 0.5 packs/day for 21.3 years (10.7 ttl pk-yrs)     Types: Vaping with nicotine, Cigarettes     Start date: 2/9/2003     Passive exposure: Current    Smokeless tobacco: Never   Substance Use Topics    Alcohol use: Yes     Comment: social    Drug use: Never       Social History     Social History Narrative     to Kendra. 1 son (2023 6 years  "old); [place].  and manager [employer] Occasional social alcohol use. Smoker since 19 yo about 1 ppd.  03/2021 reports that he decreased his smoking to half a pack a day.  Denies illicit drugs.       ALLERGIES:   Review of patient's allergies indicates:  No Known Allergies    MEDS:   Current Outpatient Medications on File Prior to Visit   Medication Sig Dispense Refill Last Dose    aspirin (ECOTRIN) 81 MG EC tablet Take 81 mg by mouth once daily.   Taking    multivit-minerals/folic acid (ONE-A-DAY MEN VITACRAVES ORAL) Take 1 tablet by mouth once daily.   Taking    [DISCONTINUED] hydroCHLOROthiazide (HYDRODIURIL) 25 MG tablet Take 1 tablet (25 mg total) by mouth once daily. 90 tablet 3 Taking    [DISCONTINUED] losartan (COZAAR) 50 MG tablet Take 1 tablet (50 mg total) by mouth once daily. 30 tablet 0 Taking    [DISCONTINUED] pravastatin (PRAVACHOL) 10 MG tablet Take 1 tablet (10 mg total) by mouth once daily. 30 tablet 0 Taking    [DISCONTINUED] TRUE METRIX GLUCOSE METER Misc USE UTD  0 Taking    [DISCONTINUED] (Magic mouthwash) 1:1:1 diphenhydrAMINE(Benadryl) 12.5mg/5ml liq, aluminum & magnesium hydroxide-simethicone (Maalox), LIDOcaine viscous 2% Swish and spit 10 mLs every 4 (four) hours as needed (Sore throat). (Patient not taking: Reported on 6/11/2024) 90 mL 0 Not Taking    [DISCONTINUED] mupirocin (BACTROBAN) 2 % ointment Apply topically 2 (two) times daily. (Patient not taking: Reported on 6/11/2024) 15 g 0 Not Taking    [DISCONTINUED] TRUEPLUS LANCETS 33 gauge Misc CHECK BLOOD SUGAR QD (Patient not taking: Reported on 6/11/2024)  3 Not Taking         Vital signs:   Vitals:    06/11/24 0911 06/11/24 0932   BP: (!) 130/90 132/82   Pulse: 92    SpO2: 95%    Weight: (!) 177.3 kg (390 lb 14 oz)    Height: 5' 11" (1.803 m)      Body mass index is 54.52 kg/m².    PHYSICAL EXAM:     Physical Exam  Constitutional:       General: He is not in acute distress.  Cardiovascular:      Rate and Rhythm: " Normal rate and regular rhythm.      Pulses:           Dorsalis pedis pulses are 2+ on the right side and 2+ on the left side.        Posterior tibial pulses are 2+ on the right side and 2+ on the left side.      Heart sounds: Normal heart sounds. No murmur heard.     No friction rub. No gallop.   Pulmonary:      Effort: Pulmonary effort is normal.      Breath sounds: Normal breath sounds. No wheezing, rhonchi or rales.   Abdominal:      General: Bowel sounds are normal.      Palpations: Abdomen is soft.   Musculoskeletal:      Cervical back: Neck supple.      Right lower leg: No edema.      Left lower leg: No edema.   Feet:      Right foot:      Protective Sensation: 5 sites tested.  5 sites sensed.      Skin integrity: Skin integrity normal.      Left foot:      Protective Sensation: 5 sites tested.  5 sites sensed.      Skin integrity: Skin integrity normal.   Skin:     General: Skin is warm.   Neurological:      Mental Status: He is alert.             PERTINENT RESULTS:   No visits with results within 1 Week(s) from this visit.   Latest known visit with results is:   Patient Message on 02/28/2024   Component Date Value Ref Range Status    WBC 06/10/2024 6.4  3.8 - 10.8 Thousand/uL Final    RBC 06/10/2024 4.82  4.20 - 5.80 Million/uL Final    Hemoglobin 06/10/2024 14.1  13.2 - 17.1 g/dL Final    Hematocrit 06/10/2024 44.7  38.5 - 50.0 % Final    MCV 06/10/2024 92.7  80.0 - 100.0 fL Final    MCH 06/10/2024 29.3  27.0 - 33.0 pg Final    MCHC 06/10/2024 31.5 (L)  32.0 - 36.0 g/dL Final    RDW 06/10/2024 12.9  11.0 - 15.0 % Final    Platelets 06/10/2024 221  140 - 400 Thousand/uL Final    MPV 06/10/2024 11.5  7.5 - 12.5 fL Final    Neutrophils, Abs 06/10/2024 3,520  1,500 - 7,800 cells/uL Final    Bands 06/10/2024 CANCELED  0 - 750 cells/uL Final    Result canceled by the ancillary.    Metamyelocytes 06/10/2024 CANCELED  0 cells/uL Final    Result canceled by the ancillary.    Myelocytes 06/10/2024 CANCELED  0  cells/uL Final    Result canceled by the ancillary.    Promyelocytes 06/10/2024 CANCELED  0 cells/uL Final    Result canceled by the ancillary.    Lymph # 06/10/2024 2,003  850 - 3,900 cells/uL Final    Mono # 06/10/2024 678  200 - 950 cells/uL Final    Eos # 06/10/2024 141  15 - 500 cells/uL Final    Baso # 06/10/2024 58  0 - 200 cells/uL Final    Blasts Absolute 06/10/2024 CANCELED  0 cells/uL Final    Result canceled by the ancillary.    Absolute Nucleated RBC 06/10/2024 CANCELED  0 cells/uL Final    Result canceled by the ancillary.    Neutrophils Relative 06/10/2024 55  % Final    Bands 06/10/2024 CANCELED  % Final    Result canceled by the ancillary.    Metamyelocytes Relative 06/10/2024 CANCELED  % Final    Result canceled by the ancillary.    Myelocytes 06/10/2024 CANCELED  % Final    Result canceled by the ancillary.    Promyelocytes Relative 06/10/2024 CANCELED  % Final    Result canceled by the ancillary.    Lymph % 06/10/2024 31.3  % Final    Atypical Lymphocytes Relative 06/10/2024 CANCELED  0 - 10 % Final    Result canceled by the ancillary.    Mono % 06/10/2024 10.6  % Final    Eosinophil % 06/10/2024 2.2  % Final    Basophil % 06/10/2024 0.9  % Final    Blasts 06/10/2024 CANCELED  % Final    Result canceled by the ancillary.    Manual nRBC per 100 Cells 06/10/2024 CANCELED  0 /100 WBC Final    Result canceled by the ancillary.    Differential Comment 06/10/2024 CANCELED   Final    Result canceled by the ancillary.     Lab Results   Component Value Date    HGBA1C 5.9 (H) 03/30/2023     Lab Results   Component Value Date    CHOL 145 03/30/2023    CHOL 149 03/25/2022    CHOL 133 06/30/2021     Lab Results   Component Value Date    HDL 45 03/30/2023    HDL 50 03/25/2022    HDL 39 (L) 06/30/2021     Lab Results   Component Value Date    LDLCALC 78 03/30/2023    LDLCALC 85 03/25/2022    LDLCALC 75 06/30/2021     Lab Results   Component Value Date    TRIG 121 03/30/2023    TRIG 62 03/25/2022    TRIG 111  06/30/2021       Lab Results   Component Value Date    CHOLHDL 3.2 03/30/2023    CHOLHDL 3.0 03/25/2022    CHOLHDL 3.4 06/30/2021     CMP  Sodium   Date Value Ref Range Status   03/30/2023 138 135 - 146 mmol/L Final     Potassium   Date Value Ref Range Status   03/30/2023 4.3 3.5 - 5.3 mmol/L Final     Chloride   Date Value Ref Range Status   03/30/2023 102 98 - 110 mmol/L Final     CO2   Date Value Ref Range Status   03/30/2023 28 20 - 32 mmol/L Final     Glucose   Date Value Ref Range Status   03/30/2023 126 (H) 65 - 99 mg/dL Final     Comment:                   Fasting reference interval     For someone without known diabetes, a glucose  value >125 mg/dL indicates that they may have  diabetes and this should be confirmed with a  follow-up test.          BUN   Date Value Ref Range Status   03/30/2023 15 7 - 25 mg/dL Final     Creatinine   Date Value Ref Range Status   03/30/2023 0.66 0.60 - 1.26 mg/dL Final     Calcium   Date Value Ref Range Status   03/30/2023 9.0 8.6 - 10.3 mg/dL Final     Total Protein   Date Value Ref Range Status   03/30/2023 6.5 6.1 - 8.1 g/dL Final     Albumin   Date Value Ref Range Status   03/30/2023 3.9 3.6 - 5.1 g/dL Final     Total Bilirubin   Date Value Ref Range Status   03/30/2023 0.5 0.2 - 1.2 mg/dL Final     Alkaline Phosphatase   Date Value Ref Range Status   06/13/2019 76 40 - 115 U/L Final     AST   Date Value Ref Range Status   03/30/2023 12 10 - 40 U/L Final     ALT   Date Value Ref Range Status   03/30/2023 21 9 - 46 U/L Final     eGFR   Date Value Ref Range Status   03/30/2023 124 > OR = 60 mL/min/1.73m2 Final     Comment:     The eGFR is based on the CKD-EPI 2021 equation. To calculate   the new eGFR from a previous Creatinine or Cystatin C  result, go to https://www.kidney.org/professionals/  kdoqi/gfr%5Fcalculator           ASSESSMENT/PLAN:    1. Type 2 diabetes mellitus with hyperglycemia, without long-term current use of insulin  Overview:  - A1c pending  - glucose  >400 mg/dL during visit  -  recommend increase Mounjaro 5 mg weekly to 7.5 mg weekly   -recommend restart metformin  mg twice a day with meals   -depending on his A1c he may need to start insulin   -recommend MyChart follow-up once week of his glucose level an in-person follow up in 1 month in 3 months for repeat A1c  - extensive discussion with his wife and him about management of his diabetes    Orders:  -     Ambulatory referral/consult to Optometry; Future; Expected date: 06/18/2024  -     pravastatin (PRAVACHOL) 10 MG tablet; Take 1 tablet (10 mg total) by mouth once daily.  Dispense: 90 tablet; Refill: 3  -     blood sugar diagnostic Strp; 1 strip by Misc.(Non-Drug; Combo Route) route Daily.  Dispense: 100 each; Refill: 3  -     POCT Glucose, Hand-Held Device  -     tirzepatide 7.5 mg/0.5 mL PnIj; Inject 7.5 mg into the skin every 7 days.  Dispense: 6 mL; Refill: 0  -     Hemoglobin A1C; Future; Expected date: 09/11/2024  -     metFORMIN (GLUCOPHAGE-XR) 500 MG ER 24hr tablet; Take 1 tablet (500 mg total) by mouth 2 (two) times daily with meals.  Dispense: 180 tablet; Refill: 3  -     lancets Misc; 1 lancet  by Misc.(Non-Drug; Combo Route) route Daily.  Dispense: 100 each; Refill: 3  -     blood-glucose meter Misc; USE DAILY AS DIRECTED  Dispense: 1 each; Refill: 0    2. Hypertension associated with type 2 diabetes mellitus  Overview:  -  near goal   -continue losartan and hydrochlorothiazide at this time   -may adjust losartan blood pressure remains greater than 130/80    Orders:  -     hydroCHLOROthiazide (HYDRODIURIL) 25 MG tablet; Take 1 tablet (25 mg total) by mouth once daily.  Dispense: 90 tablet; Refill: 3  -     losartan (COZAAR) 50 MG tablet; Take 1 tablet (50 mg total) by mouth once daily.  Dispense: 90 tablet; Refill: 3    3. Witnessed episode of apnea  Overview:  - sleep study ordered last visit 6/28/19    Orders:  -     Ambulatory referral/consult to Sleep Disorders; Future; Expected date:  06/18/2024    4. Class 3 severe obesity due to excess calories with serious comorbidity and body mass index (BMI) of 50.0 to 59.9 in adult  Overview:  - discussed recommendation for diet and cardiovascular exercise  - counseling on lifestyle modifications for risk factor reduction  - counseling on management options  - Matthieu Son denies history of pancreatitis or heavy alcohol use and denies family and person history of thyroid medullary cancer and MENs  +  weight loss since lasted a however I suspect is related with diuresis secondary to poorly controlled diabetes      5. Vaping nicotine dependence, tobacco product  Overview:  - started 18 years old  - 1 pack per day  - 03/2021 reduce to half a pack a day   - 8/2022 quit smoking and vaping only  - recommend quit vaping  - declined smoking cessation program at this time      Other orders  -     Cancel: Comprehensive Metabolic Panel; Future; Expected date: 12/11/2024  -     Cancel: Hemoglobin A1C; Future; Expected date: 12/11/2024          ORDERS:   Orders Placed This Encounter    Hemoglobin A1C    Ambulatory referral/consult to Optometry    Ambulatory referral/consult to Sleep Disorders    POCT Glucose, Hand-Held Device    pravastatin (PRAVACHOL) 10 MG tablet    blood sugar diagnostic Strp    tirzepatide 7.5 mg/0.5 mL PnIj    hydroCHLOROthiazide (HYDRODIURIL) 25 MG tablet    losartan (COZAAR) 50 MG tablet    metFORMIN (GLUCOPHAGE-XR) 500 MG ER 24hr tablet    lancets Misc    blood-glucose meter Misc       Vaccines recommended: covid-19 update    Follow up in about 1 month (around 7/11/2024) for diabetes + 3 months daibetes with A1C. or sooner with any concerns        This note is dictated using the M*Modal Fluency Direct word recognition program. There are word recognition mistakes that are occasionally missed on review.    Dr. Ebony Collins D.O.   Family Medicine

## 2024-06-11 ENCOUNTER — OFFICE VISIT (OUTPATIENT)
Dept: PRIMARY CARE CLINIC | Facility: CLINIC | Age: 39
End: 2024-06-11
Attending: FAMILY MEDICINE
Payer: COMMERCIAL

## 2024-06-11 ENCOUNTER — TELEPHONE (OUTPATIENT)
Dept: ORTHOPEDICS | Facility: CLINIC | Age: 39
End: 2024-06-11
Payer: COMMERCIAL

## 2024-06-11 ENCOUNTER — PATIENT MESSAGE (OUTPATIENT)
Dept: FAMILY MEDICINE | Facility: CLINIC | Age: 39
End: 2024-06-11
Payer: COMMERCIAL

## 2024-06-11 ENCOUNTER — PATIENT MESSAGE (OUTPATIENT)
Dept: PRIMARY CARE CLINIC | Facility: CLINIC | Age: 39
End: 2024-06-11

## 2024-06-11 VITALS
OXYGEN SATURATION: 95 % | HEART RATE: 92 BPM | DIASTOLIC BLOOD PRESSURE: 82 MMHG | BODY MASS INDEX: 44.1 KG/M2 | WEIGHT: 315 LBS | HEIGHT: 71 IN | SYSTOLIC BLOOD PRESSURE: 132 MMHG

## 2024-06-11 DIAGNOSIS — R06.81 WITNESSED EPISODE OF APNEA: ICD-10-CM

## 2024-06-11 DIAGNOSIS — F17.290 VAPING NICOTINE DEPENDENCE, TOBACCO PRODUCT: ICD-10-CM

## 2024-06-11 DIAGNOSIS — E11.59 HYPERTENSION ASSOCIATED WITH TYPE 2 DIABETES MELLITUS: ICD-10-CM

## 2024-06-11 DIAGNOSIS — E11.65 TYPE 2 DIABETES MELLITUS WITH HYPERGLYCEMIA, WITHOUT LONG-TERM CURRENT USE OF INSULIN: Primary | ICD-10-CM

## 2024-06-11 DIAGNOSIS — E66.01 CLASS 3 SEVERE OBESITY DUE TO EXCESS CALORIES WITH SERIOUS COMORBIDITY AND BODY MASS INDEX (BMI) OF 50.0 TO 59.9 IN ADULT: ICD-10-CM

## 2024-06-11 DIAGNOSIS — I15.2 HYPERTENSION ASSOCIATED WITH TYPE 2 DIABETES MELLITUS: ICD-10-CM

## 2024-06-11 LAB
ALBUMIN SERPL-MCNC: 3.8 G/DL (ref 3.6–5.1)
ALBUMIN/CREAT UR: 16 MG/G CREAT
ALBUMIN/GLOB SERPL: 1.6 (CALC) (ref 1–2.5)
ALP SERPL-CCNC: 81 U/L (ref 36–130)
ALT SERPL-CCNC: 33 U/L (ref 9–46)
AST SERPL-CCNC: 17 U/L (ref 10–40)
BASOPHILS # BLD AUTO: 58 CELLS/UL (ref 0–200)
BASOPHILS NFR BLD AUTO: 0.9 %
BILIRUB SERPL-MCNC: 0.6 MG/DL (ref 0.2–1.2)
BLASTS # BLD: ABNORMAL CELLS/UL
BLASTS NFR BLD MANUAL: ABNORMAL %
BUN SERPL-MCNC: 10 MG/DL (ref 7–25)
BUN/CREAT SERPL: ABNORMAL (CALC) (ref 6–22)
CALCIUM SERPL-MCNC: 8.8 MG/DL (ref 8.6–10.3)
CHLORIDE SERPL-SCNC: 97 MMOL/L (ref 98–110)
CHOLEST SERPL-MCNC: 199 MG/DL
CHOLEST/HDLC SERPL: 5.2 (CALC)
CO2 SERPL-SCNC: 26 MMOL/L (ref 20–32)
CREAT SERPL-MCNC: 0.69 MG/DL (ref 0.6–1.26)
CREAT UR-MCNC: 83 MG/DL (ref 20–320)
EGFR: 121 ML/MIN/1.73M2
EOSINOPHIL # BLD AUTO: 141 CELLS/UL (ref 15–500)
EOSINOPHIL NFR BLD AUTO: 2.2 %
ERYTHROCYTE [DISTWIDTH] IN BLOOD BY AUTOMATED COUNT: 12.9 % (ref 11–15)
GLOBULIN SER CALC-MCNC: 2.4 G/DL (CALC) (ref 1.9–3.7)
GLUCOSE SERPL-MCNC: 430 MG/DL (ref 65–99)
HBA1C MFR BLD: 13.9 % OF TOTAL HGB
HCT VFR BLD AUTO: 44.7 % (ref 38.5–50)
HDLC SERPL-MCNC: 38 MG/DL
HGB BLD-MCNC: 14.1 G/DL (ref 13.2–17.1)
LDLC SERPL CALC-MCNC: 134 MG/DL (CALC)
LYMPHOCYTES # BLD AUTO: 2003 CELLS/UL (ref 850–3900)
LYMPHOCYTES NFR BLD AUTO: 31.3 %
MCH RBC QN AUTO: 29.3 PG (ref 27–33)
MCHC RBC AUTO-ENTMCNC: 31.5 G/DL (ref 32–36)
MCV RBC AUTO: 92.7 FL (ref 80–100)
METAMYELOCYTES # BLD: ABNORMAL CELLS/UL
METAMYELOCYTES NFR BLD MANUAL: ABNORMAL %
MICROALBUMIN UR-MCNC: 1.3 MG/DL
MONOCYTES # BLD AUTO: 678 CELLS/UL (ref 200–950)
MONOCYTES NFR BLD AUTO: 10.6 %
MYELOCYTES # BLD: ABNORMAL CELLS/UL
MYELOCYTES NFR BLD MANUAL: ABNORMAL %
NEUTROPHILS # BLD AUTO: 3520 CELLS/UL (ref 1500–7800)
NEUTROPHILS NFR BLD AUTO: 55 %
NEUTS BAND # BLD: ABNORMAL CELLS/UL (ref 0–750)
NEUTS BAND NFR BLD MANUAL: ABNORMAL %
NONHDLC SERPL-MCNC: 161 MG/DL (CALC)
NRBC # BLD: ABNORMAL CELLS/UL
NRBC BLD-RTO: ABNORMAL /100 WBC
PLATELET # BLD AUTO: 221 THOUSAND/UL (ref 140–400)
PMV BLD REES-ECKER: 11.5 FL (ref 7.5–12.5)
POTASSIUM SERPL-SCNC: 4.4 MMOL/L (ref 3.5–5.3)
PROMYELOCYTES # BLD: ABNORMAL CELLS/UL
PROMYELOCYTES NFR BLD MANUAL: ABNORMAL %
PROT SERPL-MCNC: 6.2 G/DL (ref 6.1–8.1)
RBC # BLD AUTO: 4.82 MILLION/UL (ref 4.2–5.8)
SERVICE CMNT-IMP: ABNORMAL
SODIUM SERPL-SCNC: 135 MMOL/L (ref 135–146)
TRIGL SERPL-MCNC: 138 MG/DL
VARIANT LYMPHS NFR BLD: ABNORMAL % (ref 0–10)
WBC # BLD AUTO: 6.4 THOUSAND/UL (ref 3.8–10.8)

## 2024-06-11 PROCEDURE — 99214 OFFICE O/P EST MOD 30 MIN: CPT | Mod: S$GLB,,, | Performed by: FAMILY MEDICINE

## 2024-06-11 PROCEDURE — 99999 PR PBB SHADOW E&M-EST. PATIENT-LVL IV: CPT | Mod: PBBFAC,,, | Performed by: FAMILY MEDICINE

## 2024-06-11 RX ORDER — HYDROCHLOROTHIAZIDE 25 MG/1
25 TABLET ORAL DAILY
Qty: 90 TABLET | Refills: 3 | Status: SHIPPED | OUTPATIENT
Start: 2024-06-11

## 2024-06-11 RX ORDER — LOSARTAN POTASSIUM 50 MG/1
50 TABLET ORAL DAILY
Qty: 90 TABLET | Refills: 3 | Status: SHIPPED | OUTPATIENT
Start: 2024-06-11 | End: 2025-06-11

## 2024-06-11 RX ORDER — LANCETS
1 EACH MISCELLANEOUS DAILY
Qty: 100 EACH | Refills: 3 | Status: SHIPPED | OUTPATIENT
Start: 2024-06-11

## 2024-06-11 RX ORDER — METFORMIN HYDROCHLORIDE 500 MG/1
500 TABLET, EXTENDED RELEASE ORAL 2 TIMES DAILY WITH MEALS
Qty: 180 TABLET | Refills: 3 | Status: SHIPPED | OUTPATIENT
Start: 2024-06-11 | End: 2025-06-11

## 2024-06-11 RX ORDER — DEXTROSE 4 G
TABLET,CHEWABLE ORAL
Qty: 1 EACH | Refills: 0 | Status: SHIPPED | OUTPATIENT
Start: 2024-06-11

## 2024-06-11 RX ORDER — PRAVASTATIN SODIUM 10 MG/1
10 TABLET ORAL DAILY
Qty: 90 TABLET | Refills: 3 | Status: SHIPPED | OUTPATIENT
Start: 2024-06-11 | End: 2025-06-11

## 2024-06-12 ENCOUNTER — TELEPHONE (OUTPATIENT)
Dept: ORTHOPEDICS | Facility: CLINIC | Age: 39
End: 2024-06-12
Payer: COMMERCIAL

## 2024-06-13 DIAGNOSIS — E11.65 TYPE 2 DIABETES MELLITUS WITH HYPERGLYCEMIA, WITHOUT LONG-TERM CURRENT USE OF INSULIN: ICD-10-CM

## 2024-06-13 RX ORDER — DEXTROSE 4 G
TABLET,CHEWABLE ORAL
Qty: 1 EACH | Refills: 0 | OUTPATIENT
Start: 2024-06-13

## 2024-06-13 NOTE — TELEPHONE ENCOUNTER
No care due was identified.  [facility] Embedded Care Due Messages. Reference number: [id number].   6/13/2024 5:08:33 AM CDT

## 2024-06-13 NOTE — TELEPHONE ENCOUNTER
Refill Decision Note   Matthieumariela Son  is requesting a refill authorization.  Brief Assessment and Rationale for Refill:  Quick Discontinue     Medication Therapy Plan: DISPENSED 6/12/2024 [facility]      Comments:     Note composed:5:39 AM 06/13/2024

## 2024-06-17 ENCOUNTER — PATIENT MESSAGE (OUTPATIENT)
Dept: PRIMARY CARE CLINIC | Facility: CLINIC | Age: 39
End: 2024-06-17
Payer: COMMERCIAL

## 2024-06-18 NOTE — ASSESSMENT & PLAN NOTE
- recommend quit smoking  - readiness 6/10 and would try nicotine supplementation  - dicussed smoking cessation program pt declined at this time   three times a week MWF     • ENTRESTO 49-51 MG per tablet Take 1 tablet by mouth 2 times daily     • varenicline (CHANTIX) 0.5 MG tablet 0.5mg DAILY for 3 days followed by 0.5mg TWICE DAILY for 4 days followed by 1mg TWICE DAILY (Patient not taking: Reported on 6/14/2024) 57 tablet 0   • ascorbic acid (VITAMIN C) 500 MG tablet Take by mouth     • aspirin 81 MG EC tablet Take by mouth daily     • atorvastatin (LIPITOR) 20 MG tablet Take 1 tablet by mouth daily     • clopidogrel (PLAVIX) 75 MG tablet Take 1 tablet by mouth Takes 1 pill every other day     • sotalol (BETAPACE) 80 MG tablet Take 1 tablet by mouth 2 times daily ceived the following from [facility]: Outside name: sotalol (BETAPACE) 80 mg tablet         Allergies:    Allergies   Allergen Reactions   • Erythromycin Other (See Comments)     Instructed not to take as may have reaction with prescribed medication.       Problem List:    Patient Active Problem List   Diagnosis Code   • Coronary artery disease involving native coronary artery of native heart without angina pectoris I25.10   • Chronic systolic congestive heart failure (HCC) I50.22   • Pure hypercholesterolemia E78.00   • Nicotine dependence F17.200   • Positive PPD R76.11       Past Medical History:        Diagnosis Date   • A-fib (HCC) 02/2004    during colonsccopy   • CAD (coronary artery disease) 12/28/2003    MI    • Congestive heart failure (HCC)        Past Surgical History:        Procedure Laterality Date   • CARDIAC CATHETERIZATION  2004    stents x 3   • COLONOSCOPY N/A 8/31/2018    COLONOSCOPY performed by Cain [facility]vaez [facility] at [facility] ENDOSCOPY   • HERNIA REPAIR Left 2001    inguinal   • PACEMAKER Left 2004   • PACEMAKER PLACEMENT      medtronic       Social History:    Social History     Tobacco Use   • Smoking status: Every Day     Current packs/day: 0.50     Types: Cigarettes   • Smokeless tobacco: Never   Substance Use Topics   • Alcohol use: Yes     Alcohol/week:

## 2024-07-01 ENCOUNTER — PATIENT MESSAGE (OUTPATIENT)
Dept: PRIMARY CARE CLINIC | Facility: CLINIC | Age: 39
End: 2024-07-01
Payer: COMMERCIAL

## 2024-07-01 ENCOUNTER — PATIENT OUTREACH (OUTPATIENT)
Dept: ADMINISTRATIVE | Facility: HOSPITAL | Age: 39
End: 2024-07-01
Payer: COMMERCIAL

## 2024-07-01 DIAGNOSIS — E11.65 TYPE 2 DIABETES MELLITUS WITH HYPERGLYCEMIA, WITHOUT LONG-TERM CURRENT USE OF INSULIN: Primary | ICD-10-CM

## 2024-07-08 RX ORDER — METFORMIN HYDROCHLORIDE 500 MG/1
1000 TABLET, EXTENDED RELEASE ORAL 2 TIMES DAILY WITH MEALS
Qty: 360 TABLET | Refills: 3 | Status: SHIPPED | OUTPATIENT
Start: 2024-07-08 | End: 2025-07-08

## 2024-07-10 NOTE — PROGRESS NOTES
FAMILY MEDICINE  [facility]    Reason for visit:   Chief Complaint   Patient presents with    Diabetes         SUBJECTIVE: Matthieu Son is a 39 y.o. male  - smoker (started 18 years old 1 pack per day. 10/2022 changed to vaping) with morbid obesity, venous insufficiency with history lower extremity venous stasis ulcer that have healed, hypertension, and type 2 diabetes presents for follow-up diabetes     1. Diabetes Type 2     Age diagnosed: 35 yo    Since last visit Matthieu Son restarted his metformin 500 ER twice a day and his Mounjaro 7.5 mg weekly.  His glucose levels were stools staying greater than 200 mg/dL when week communicated via [employer] and I instructed him to increase his metformin to 1000 mg twice a day with meals and his Mounjaro to 10 mg weekly.  He reports he started regimen 07/01/2024 with metformin and increase his Mounjaro with his 1st 10 mg dose 07/12/2024.  He reports that he is tolerating it well.  He was rare occasional abdominal cramping.  He reports in his last week his glucose level seems to be improving     He reports he is feeling much better.  He was having urinary urgency and frequency which has resolved.    Current treatment regimen:   metFORMIN (GLUCOPHAGE-XR) 500 MG ER 24hr tablet, Take 2 tablets (1,000 mg total) by mouth 2 (two) times daily with meals., Disp: 360 tablet, Rfl: 3  - started 7/1/24  tirzepatide 10 mg/0.5 mL PnIj, Inject 10 mg into the skin every 7 days., Disp: 2 mL, Rfl: 0  - increased from 7.5 mg weekly to 10 mg weekly 7/12/24    Prior medication:  metFORMIN (GLUCOPHAGE-XR) 500 MG ER 24hr tablet, Take 1 tablet (500 mg total) by mouth 2 (two) times daily with meals  - discontinue 9/2021 visit since A1c had improved to 5.5%.  He opted to stay on Trulicity since have been helpful for his weight loss  Trulicity ( availability)     Side effects from treatment: denies  Complications of diabetes:  hyperglycemia     Glucometer: yes  Glucose  monitoring: not monitoring    POC glucose >400 mg/dL 6/11/24  Fasting >200 mg/dL until this week 168 and 189 mg/dL     Lab Results       Component                Value               Date                       HGBA1C                   13.9 (H)            06/10/2024                Lab Results       Component                Value               Date                       HGBA1C                   5.9 (H)             03/30/2023               Lab Results       Component                Value               Date                       HGBA1C                   5.6                 09/30/2022                      Low-dose statin:  Pravastatin  Last eye exam: 9/1/21 due and discussed at visit. Referral to [facility]. Eye exam ordered 6/11/24  Last foot exam: 06/11/2024     Wt Readings from Last 10 Encounters:  07/15/24 : (!) 173 kg (381 lb 6.3 oz)  06/11/24 : (!) 177.3 kg (390 lb 14 oz)  01/08/24 : (!) 194.6 kg (429 lb)  11/10/23 : (!) 194.6 kg (429 lb)  10/26/23 : (!) 194.7 kg (429 lb 5.5 oz)  04/04/23 : (!) 194.7 kg (429 lb 5.5 oz)  10/03/22 : (!) 188.5 kg (415 lb 9.6 oz)  08/13/22 : (!) 174.6 kg (385 lb)  03/28/22 : (!) 175 kg (385 lb 12.9 oz)  03/29/21 : (!) 187.8 kg (414 lb)            Review of Systems   All other systems reviewed and are negative.      HEALTH MAINTENANCE:   Health Maintenance   Topic Date Due    Eye Exam  09/01/2022    Hemoglobin A1c  12/10/2024    Lipid Panel  06/10/2025    Foot Exam  06/11/2025    TETANUS VACCINE  08/18/2026    Hepatitis C Screening  Completed     Health Maintenance Topics with due status: Not Due       Topic Last Completion Date    TETANUS VACCINE 08/18/2016    Influenza Vaccine 10/03/2022    Diabetes Urine Screening 06/10/2024    Lipid Panel 06/10/2024    Hemoglobin A1c 06/10/2024    Foot Exam 06/11/2024     Health Maintenance Due   Topic Date Due    Eye Exam  09/01/2022    COVID-19 Vaccine (2 - 2023-24 season) 09/01/2023       HISTORY:   Past Medical History:   Diagnosis Date     Diabetes mellitus, type 2     Hypertension     Venous stasis ulcer of calf limited to breakdown of skin with varicose veins 6/28/2019    Venous ulcers of both lower extremities 5/29/2019       Past Surgical History:   Procedure Laterality Date    WISDOM TOOTH EXTRACTION         Family History   Problem Relation Name Age of Onset    Diabetes Mother Meghan Son     Hypertension Mother Meghan Son     Breast cancer Mother Meghan Son     Hyperlipidemia Mother Meghan Son     Cancer Mother Meghan Son         breast cancer    Diabetes Father Michael Son     Hypertension Father Michael Son     Hyperlipidemia Father Michael Son     Peripheral vascular disease Father Michael Son     Heart attack Father Michael Son     No Known Problems Sister      No Known Problems Son      Colon cancer Maternal Grandmother      Heart disease Maternal Grandfather Wiliam Carrera     No Known Problems Paternal Grandmother      No Known Problems Paternal Grandfather         Social History     Tobacco Use    Smoking status: Every Day     Current packs/day: 0.50     Average packs/day: 0.5 packs/day for 21.4 years (10.7 ttl pk-yrs)     Types: Vaping with nicotine, Cigarettes     Start date: 2/9/2003     Passive exposure: Current    Smokeless tobacco: Never   Substance Use Topics    Alcohol use: Yes     Comment: social    Drug use: Never       Social History     Social History Narrative     to Kendra. 1 son (2023 6 years old); [place].  and manager [employer] Occasional social alcohol use. Smoker since 17 yo about 1 ppd.  03/2021 reports that he decreased his smoking to half a pack a day.  Denies illicit drugs.       ALLERGIES:   Review of patient's allergies indicates:  No Known Allergies    MEDS:   Current Outpatient Medications on File Prior to Visit   Medication Sig Dispense Refill Last Dose    aspirin (ECOTRIN) 81 MG EC tablet Take 81 mg by mouth once daily.   Taking    blood sugar  "diagnostic Strp Use 1 strip to test blood sugar once daily 100 each 3 Taking    blood-glucose meter Misc USE DAILY AS DIRECTED 1 each 0 Taking    lancets Misc Use 1 lancet to test blood sugar once daily 100 each 3 Taking    multivit-minerals/folic acid (ONE-A-DAY MEN VITACRAVES ORAL) Take 1 tablet by mouth once daily.   Taking    tirzepatide 10 mg/0.5 mL PnIj Inject 10 mg into the skin every 7 days. 2 mL 0 Taking    [DISCONTINUED] hydroCHLOROthiazide (HYDRODIURIL) 25 MG tablet Take 1 tablet (25 mg total) by mouth once daily. 90 tablet 3 Taking    [DISCONTINUED] losartan (COZAAR) 50 MG tablet Take 1 tablet (50 mg total) by mouth once daily. 90 tablet 3 Taking    [DISCONTINUED] metFORMIN (GLUCOPHAGE-XR) 500 MG ER 24hr tablet Take 2 tablets (1,000 mg total) by mouth 2 (two) times daily with meals. 360 tablet 3 Taking    [DISCONTINUED] pravastatin (PRAVACHOL) 10 MG tablet Take 1 tablet (10 mg total) by mouth once daily. 90 tablet 3 Taking         Vital signs:   Vitals:    07/15/24 1258   BP: 116/81   BP Location: Left arm   Patient Position: Sitting   Pulse: 90   SpO2: 97%   Weight: (!) 173 kg (381 lb 6.3 oz)   Height: 5' 11" (1.803 m)       Body mass index is 53.19 kg/m².    PHYSICAL EXAM:     Physical Exam  Constitutional:       General: He is not in acute distress.  Cardiovascular:      Rate and Rhythm: Normal rate and regular rhythm.   Pulmonary:      Effort: Pulmonary effort is normal. No respiratory distress.   Musculoskeletal:      Right lower leg: No edema.      Left lower leg: No edema.   Neurological:      Mental Status: He is alert.   Psychiatric:         Speech: Speech normal.             PERTINENT RESULTS:   No visits with results within 1 Week(s) from this visit.   Latest known visit with results is:   Patient Message on 02/28/2024   Component Date Value Ref Range Status    Hemoglobin A1C 06/10/2024 13.9 (H)  <5.7 % of total Hgb Final    Comment: For someone without known diabetes, a hemoglobin A1c  value " of 6.5% or greater indicates that they may have   diabetes and this should be confirmed with a follow-up   test.     For someone with known diabetes, a value <7% indicates   that their diabetes is well controlled and a value   greater than or equal to 7% indicates suboptimal   control. A1c targets should be individualized based on   duration of diabetes, age, comorbid conditions, and   other considerations.     Currently, no consensus exists regarding use of  hemoglobin A1c for diagnosis of diabetes for children.         This test was performed on the Roche alexander c503 platform.  Effective 11/13/23, a change in test platforms from the  Abbott  to the Roche alexander c503 may have shifted  HbA1c results compared to historical results.  Based on laboratory validation testing conducted at  [facility], the Roche platform relative to the Abbott  platform had an average increase in HbA1c value of  < or = 0.3%. This difference is with                           in accepted   variability established by the National Glycohemoglobin  Standardization Program. Note that not all individuals  will have had a shift in their results and direct  comparisons between historical and current results for  testing conducted on different platforms is not  recommended.          Glucose 06/10/2024 430 (H)  65 - 99 mg/dL Final    Comment: Verified by repeat analysis.                   Fasting reference interval     For someone without known diabetes, a glucose  value >125 mg/dL indicates that they may have  diabetes and this should be confirmed with a  follow-up test.         BUN 06/10/2024 10  7 - 25 mg/dL Final    Creatinine 06/10/2024 0.69  0.60 - 1.26 mg/dL Final    eGFR 06/10/2024 121  > OR = 60 mL/min/1.73m2 Final    BUN/Creatinine Ratio 06/10/2024 SEE NOTE:  6 - 22 (calc) Final    Comment:    Not Reported: BUN and Creatinine are within     reference range.            Sodium 06/10/2024 135  135 - 146 mmol/L Final    Potassium 06/10/2024  4.4  3.5 - 5.3 mmol/L Final    Chloride 06/10/2024 97 (L)  98 - 110 mmol/L Final    CO2 06/10/2024 26  20 - 32 mmol/L Final    Calcium 06/10/2024 8.8  8.6 - 10.3 mg/dL Final    Total Protein 06/10/2024 6.2  6.1 - 8.1 g/dL Final    Albumin 06/10/2024 3.8  3.6 - 5.1 g/dL Final    Globulin, Total 06/10/2024 2.4  1.9 - 3.7 g/dL (calc) Final    Albumin/Globulin Ratio 06/10/2024 1.6  1.0 - 2.5 (calc) Final    Total Bilirubin 06/10/2024 0.6  0.2 - 1.2 mg/dL Final    Alkaline Phosphatase 06/10/2024 81  36 - 130 U/L Final    AST 06/10/2024 17  10 - 40 U/L Final    ALT 06/10/2024 33  9 - 46 U/L Final    WBC 06/10/2024 6.4  3.8 - 10.8 Thousand/uL Final    RBC 06/10/2024 4.82  4.20 - 5.80 Million/uL Final    Hemoglobin 06/10/2024 14.1  13.2 - 17.1 g/dL Final    Hematocrit 06/10/2024 44.7  38.5 - 50.0 % Final    MCV 06/10/2024 92.7  80.0 - 100.0 fL Final    MCH 06/10/2024 29.3  27.0 - 33.0 pg Final    MCHC 06/10/2024 31.5 (L)  32.0 - 36.0 g/dL Final    RDW 06/10/2024 12.9  11.0 - 15.0 % Final    Platelets 06/10/2024 221  140 - 400 Thousand/uL Final    MPV 06/10/2024 11.5  7.5 - 12.5 fL Final    Neutrophils, Abs 06/10/2024 3,520  1,500 - 7,800 cells/uL Final    Bands 06/10/2024 CANCELED  0 - 750 cells/uL Final    Result canceled by the ancillary.    Metamyelocytes 06/10/2024 CANCELED  0 cells/uL Final    Result canceled by the ancillary.    Myelocytes 06/10/2024 CANCELED  0 cells/uL Final    Result canceled by the ancillary.    Promyelocytes 06/10/2024 CANCELED  0 cells/uL Final    Result canceled by the ancillary.    Lymph # 06/10/2024 2,003  850 - 3,900 cells/uL Final    Mono # 06/10/2024 678  200 - 950 cells/uL Final    Eos # 06/10/2024 141  15 - 500 cells/uL Final    Baso # 06/10/2024 58  0 - 200 cells/uL Final    Blasts Absolute 06/10/2024 CANCELED  0 cells/uL Final    Result canceled by the ancillary.    Absolute Nucleated RBC 06/10/2024 CANCELED  0 cells/uL Final    Result canceled by the ancillary.    Neutrophils Relative  06/10/2024 55  % Final    Bands 06/10/2024 CANCELED  % Final    Result canceled by the ancillary.    Metamyelocytes Relative 06/10/2024 CANCELED  % Final    Result canceled by the ancillary.    Myelocytes 06/10/2024 CANCELED  % Final    Result canceled by the ancillary.    Promyelocytes Relative 06/10/2024 CANCELED  % Final    Result canceled by the ancillary.    Lymph % 06/10/2024 31.3  % Final    Atypical Lymphocytes Relative 06/10/2024 CANCELED  0 - 10 % Final    Result canceled by the ancillary.    Mono % 06/10/2024 10.6  % Final    Eosinophil % 06/10/2024 2.2  % Final    Basophil % 06/10/2024 0.9  % Final    Blasts 06/10/2024 CANCELED  % Final    Result canceled by the ancillary.    Manual nRBC per 100 Cells 06/10/2024 CANCELED  0 /100 WBC Final    Result canceled by the ancillary.    Differential Comment 06/10/2024 CANCELED   Final    Result canceled by the ancillary.    Cholesterol 06/10/2024 199  <200 mg/dL Final    HDL 06/10/2024 38 (L)  > OR = 40 mg/dL Final    Triglycerides 06/10/2024 138  <150 mg/dL Final    LDL Cholesterol 06/10/2024 134 (H)  mg/dL (calc) Final    Comment: Reference range: <100     Desirable range <100 mg/dL for primary prevention;    <70 mg/dL for patients with CHD or diabetic patients   with > or = 2 CHD risk factors.     LDL-C is now calculated using the Eirk-Patel   calculation, which is a validated novel method providing   better accuracy than the Friedewald equation in the   estimation of LDL-C.   Erik ACOSTA et al. ARLENE. 2013;310(19): 2111-9657   (http://education.NASOFORM.com/faq/AYX871)      HDL/Cholesterol Ratio 06/10/2024 5.2 (H)  <5.0 (calc) Final    Non HDL Chol. (LDL+VLDL) 06/10/2024 161 (H)  <130 mg/dL (calc) Final    Comment: For patients with diabetes plus 1 major ASCVD risk   factor, treating to a non-HDL-C goal of <100 mg/dL   (LDL-C of <70 mg/dL) is considered a therapeutic   option.      Creatinine, Urine 06/10/2024 83  20 - 320 mg/dL Final    Microalb, Ur  06/10/2024 1.3  See Note: mg/dL Final    Comment: Reference Range:    Reference Range  Not established      Microalb/Creat Ratio 06/10/2024 16  <30 mg/g creat Final    Comment:    The ADA defines abnormalities in albumin  excretion as follows:     Albuminuria Category        Result (mg/g creatinine)     Normal to Mildly increased   <30  Moderately increased            Severely increased           > OR = 300     The ADA recommends that at least two of three  specimens collected within a 3-6 month period be  abnormal before considering a patient to be  within a diagnostic category.         ASSESSMENT/PLAN:    1. Type 2 diabetes mellitus with hyperglycemia, without long-term current use of insulin  Overview:  Lab Results   Component Value Date    HGBA1C 13.9 (H) 06/10/2024     - glucose >400 mg/dL during visit 6/11/2024  - glucose levels improved to 160's - 200 mg/d:   -  recommend continue Mounjaro 10 mg weekly which he just started 07/12/2024  -recommend continue metformin XR 1000 mg twice a day with meals   - continue to communicate with him via [employer] every 2-3 weeks until his repeat A1c in 2 months    Orders:  -     Hemoglobin A1C; Future; Expected date: 08/21/2024  -     metFORMIN (GLUCOPHAGE-XR) 500 MG ER 24hr tablet; Take 2 tablets (1,000 mg total) by mouth 2 (two) times daily with meals.  Dispense: 360 tablet; Refill: 3  -     pravastatin (PRAVACHOL) 10 MG tablet; Take 1 tablet (10 mg total) by mouth once daily.  Dispense: 90 tablet; Refill: 3    2. Hypertension associated with type 2 diabetes mellitus  Overview:  -  near goal   -continue losartan and hydrochlorothiazide at this time   -may adjust losartan blood pressure remains greater than 130/80    Orders:  -     losartan (COZAAR) 50 MG tablet; Take 1 tablet (50 mg total) by mouth once daily.  Dispense: 90 tablet; Refill: 3  -     hydroCHLOROthiazide (HYDRODIURIL) 25 MG tablet; Take 1 tablet (25 mg total) by mouth once daily.  Dispense: 90 tablet;  Refill: 3          ORDERS:   Orders Placed This Encounter    Hemoglobin A1C    metFORMIN (GLUCOPHAGE-XR) 500 MG ER 24hr tablet    pravastatin (PRAVACHOL) 10 MG tablet    losartan (COZAAR) 50 MG tablet    hydroCHLOROthiazide (HYDRODIURIL) 25 MG tablet         Vaccines recommended: covid-19 update    Follow up in about 6 weeks (around 8/26/2024). or sooner with any concerns        This note is dictated using the M*Modal Fluency Direct word recognition program. There are word recognition mistakes that are occasionally missed on review.    Dr. Ebony Collins D.O.   Family Medicine

## 2024-07-15 ENCOUNTER — OFFICE VISIT (OUTPATIENT)
Dept: PRIMARY CARE CLINIC | Facility: CLINIC | Age: 39
End: 2024-07-15
Attending: FAMILY MEDICINE
Payer: COMMERCIAL

## 2024-07-15 VITALS
HEART RATE: 90 BPM | OXYGEN SATURATION: 97 % | BODY MASS INDEX: 44.1 KG/M2 | WEIGHT: 315 LBS | SYSTOLIC BLOOD PRESSURE: 116 MMHG | DIASTOLIC BLOOD PRESSURE: 81 MMHG | HEIGHT: 71 IN

## 2024-07-15 DIAGNOSIS — E11.65 TYPE 2 DIABETES MELLITUS WITH HYPERGLYCEMIA, WITHOUT LONG-TERM CURRENT USE OF INSULIN: Primary | ICD-10-CM

## 2024-07-15 DIAGNOSIS — E11.59 HYPERTENSION ASSOCIATED WITH TYPE 2 DIABETES MELLITUS: ICD-10-CM

## 2024-07-15 DIAGNOSIS — I15.2 HYPERTENSION ASSOCIATED WITH TYPE 2 DIABETES MELLITUS: ICD-10-CM

## 2024-07-15 PROCEDURE — 99999 PR PBB SHADOW E&M-EST. PATIENT-LVL III: CPT | Mod: PBBFAC,,, | Performed by: FAMILY MEDICINE

## 2024-07-15 PROCEDURE — 99214 OFFICE O/P EST MOD 30 MIN: CPT | Mod: S$GLB,,, | Performed by: FAMILY MEDICINE

## 2024-07-15 RX ORDER — LOSARTAN POTASSIUM 50 MG/1
50 TABLET ORAL DAILY
Qty: 90 TABLET | Refills: 3 | Status: SHIPPED | OUTPATIENT
Start: 2024-07-15 | End: 2025-07-15

## 2024-07-15 RX ORDER — METFORMIN HYDROCHLORIDE 500 MG/1
1000 TABLET, EXTENDED RELEASE ORAL 2 TIMES DAILY WITH MEALS
Qty: 360 TABLET | Refills: 3 | Status: SHIPPED | OUTPATIENT
Start: 2024-07-15 | End: 2025-07-15

## 2024-07-15 RX ORDER — HYDROCHLOROTHIAZIDE 25 MG/1
25 TABLET ORAL DAILY
Qty: 90 TABLET | Refills: 3 | Status: SHIPPED | OUTPATIENT
Start: 2024-07-15

## 2024-07-15 RX ORDER — PRAVASTATIN SODIUM 10 MG/1
10 TABLET ORAL DAILY
Qty: 90 TABLET | Refills: 3 | Status: SHIPPED | OUTPATIENT
Start: 2024-07-15 | End: 2025-07-15

## 2024-07-27 DIAGNOSIS — E11.65 TYPE 2 DIABETES MELLITUS WITH HYPERGLYCEMIA, WITHOUT LONG-TERM CURRENT USE OF INSULIN: ICD-10-CM

## 2024-07-27 DIAGNOSIS — I15.2 HYPERTENSION ASSOCIATED WITH TYPE 2 DIABETES MELLITUS: ICD-10-CM

## 2024-07-27 DIAGNOSIS — E11.59 HYPERTENSION ASSOCIATED WITH TYPE 2 DIABETES MELLITUS: ICD-10-CM

## 2024-07-27 NOTE — TELEPHONE ENCOUNTER
No care due was identified.  [facility] Embedded Care Due Messages. Reference number: [id number].   7/27/2024 5:17:01 PM CDT

## 2024-07-29 RX ORDER — PRAVASTATIN SODIUM 10 MG/1
10 TABLET ORAL DAILY
Qty: 90 TABLET | Refills: 3 | Status: SHIPPED | OUTPATIENT
Start: 2024-07-29 | End: 2025-07-29

## 2024-07-29 RX ORDER — LOSARTAN POTASSIUM 50 MG/1
50 TABLET ORAL DAILY
Qty: 90 TABLET | Refills: 3 | Status: SHIPPED | OUTPATIENT
Start: 2024-07-29 | End: 2025-07-29

## 2024-07-29 RX ORDER — HYDROCHLOROTHIAZIDE 25 MG/1
25 TABLET ORAL DAILY
Qty: 90 TABLET | Refills: 3 | Status: SHIPPED | OUTPATIENT
Start: 2024-07-29

## 2024-07-29 RX ORDER — METFORMIN HYDROCHLORIDE 500 MG/1
1000 TABLET, EXTENDED RELEASE ORAL 2 TIMES DAILY WITH MEALS
Qty: 360 TABLET | Refills: 3 | Status: SHIPPED | OUTPATIENT
Start: 2024-07-29 | End: 2025-07-29

## 2024-07-29 NOTE — TELEPHONE ENCOUNTER
Refill Encounter    PCP Visits: Recent Visits  Date Type Provider Dept   07/15/24 Office Visit Ebony Collins DO [facility]   06/11/24 Office Visit Ebony Collins DO [facility]   Showing recent visits within past 360 days and meeting all other requirements  Future Appointments  Date Type Provider Dept   09/12/24 Appointment Ebony Collins,  [facility]   Showing future appointments within next 720 days and meeting all other requirements     Last 3 Blood Pressure:   BP Readings from Last 3 Encounters:   07/15/24 116/81   06/11/24 132/82   01/08/24 138/87     Preferred Pharmacy:   [facility]  [address]  [place] [address]  Phone: [phone] Fax: [phone]    [id number] - [address] [facility]  AT [address]  [address] [place] [address]  Phone: [phone] Fax: [phone]    Requested RX:  Requested Prescriptions     Pending Prescriptions Disp Refills    metFORMIN (GLUCOPHAGE-XR) 500 MG ER 24hr tablet 360 tablet 3     Sig: Take 2 tablets (1,000 mg total) by mouth 2 (two) times daily with meals.    pravastatin (PRAVACHOL) 10 MG tablet 90 tablet 3     Sig: Take 1 tablet (10 mg total) by mouth once daily.    losartan (COZAAR) 50 MG tablet 90 tablet 3     Sig: Take 1 tablet (50 mg total) by mouth once daily.    hydroCHLOROthiazide (HYDRODIURIL) 25 MG tablet 90 tablet 3     Sig: Take 1 tablet (25 mg total) by mouth once daily.      RX Route: Normal

## 2024-08-02 DIAGNOSIS — E11.65 TYPE 2 DIABETES MELLITUS WITH HYPERGLYCEMIA, WITHOUT LONG-TERM CURRENT USE OF INSULIN: ICD-10-CM

## 2024-08-05 RX ORDER — TIRZEPATIDE 10 MG/.5ML
10 INJECTION, SOLUTION SUBCUTANEOUS
Qty: 2 ML | Refills: 0 | Status: SHIPPED | OUTPATIENT
Start: 2024-08-05 | End: 2024-09-04

## 2024-08-20 ENCOUNTER — PATIENT MESSAGE (OUTPATIENT)
Dept: PRIMARY CARE CLINIC | Facility: CLINIC | Age: 39
End: 2024-08-20
Payer: COMMERCIAL

## 2024-08-21 DIAGNOSIS — I15.2 HYPERTENSION ASSOCIATED WITH TYPE 2 DIABETES MELLITUS: ICD-10-CM

## 2024-08-21 DIAGNOSIS — E11.59 HYPERTENSION ASSOCIATED WITH TYPE 2 DIABETES MELLITUS: ICD-10-CM

## 2024-08-21 DIAGNOSIS — E11.65 TYPE 2 DIABETES MELLITUS WITH HYPERGLYCEMIA, WITHOUT LONG-TERM CURRENT USE OF INSULIN: ICD-10-CM

## 2024-08-21 RX ORDER — METFORMIN HYDROCHLORIDE 500 MG/1
1000 TABLET, EXTENDED RELEASE ORAL 2 TIMES DAILY WITH MEALS
Qty: 360 TABLET | Refills: 3 | Status: SHIPPED | OUTPATIENT
Start: 2024-08-21 | End: 2025-08-21

## 2024-08-21 RX ORDER — LOSARTAN POTASSIUM 50 MG/1
50 TABLET ORAL DAILY
Qty: 90 TABLET | Refills: 3 | Status: SHIPPED | OUTPATIENT
Start: 2024-08-21 | End: 2025-08-21

## 2024-08-21 RX ORDER — PRAVASTATIN SODIUM 10 MG/1
10 TABLET ORAL DAILY
Qty: 90 TABLET | Refills: 3 | Status: SHIPPED | OUTPATIENT
Start: 2024-08-21 | End: 2025-08-21

## 2024-08-21 RX ORDER — HYDROCHLOROTHIAZIDE 25 MG/1
25 TABLET ORAL DAILY
Qty: 90 TABLET | Refills: 3 | Status: SHIPPED | OUTPATIENT
Start: 2024-08-21

## 2024-08-21 NOTE — TELEPHONE ENCOUNTER
Refill Encounter    PCP Visits: Recent Visits  Date Type Provider Dept   07/15/24 Office Visit Ebony Collins DO [facility]   06/11/24 Office Visit Ebony Collins DO [facility]   Showing recent visits within past 360 days and meeting all other requirements  Future Appointments  Date Type Provider Dept   09/12/24 Appointment Ebony Collins,  [facility]   Showing future appointments within next 720 days and meeting all other requirements     Last 3 Blood Pressure:   BP Readings from Last 3 Encounters:   07/15/24 116/81   06/11/24 132/82   01/08/24 138/87     Preferred Pharmacy:   [facility]  [address]  [place] [address]  Phone: [phone] Fax: [phone]    [facility] #[id number] - [address] [facility]  AT [facility] [address]  [address] [place] [address]  Phone: [phone] Fax: [phone]    Requested RX:  Requested Prescriptions     Pending Prescriptions Disp Refills    metFORMIN (GLUCOPHAGE-XR) 500 MG ER 24hr tablet 360 tablet 3     Sig: Take 2 tablets (1,000 mg total) by mouth 2 (two) times daily with meals.    pravastatin (PRAVACHOL) 10 MG tablet 90 tablet 3     Sig: Take 1 tablet (10 mg total) by mouth once daily.    losartan (COZAAR) 50 MG tablet 90 tablet 3     Sig: Take 1 tablet (50 mg total) by mouth once daily.    hydroCHLOROthiazide (HYDRODIURIL) 25 MG tablet 90 tablet 3     Sig: Take 1 tablet (25 mg total) by mouth once daily.      RX Route: Normal

## 2024-08-21 NOTE — TELEPHONE ENCOUNTER
No care due was identified.  [facility] Embedded Care Due Messages. Reference number: [id number].   8/21/2024 2:46:19 PM CDT

## 2024-09-04 DIAGNOSIS — E11.65 TYPE 2 DIABETES MELLITUS WITH HYPERGLYCEMIA, WITHOUT LONG-TERM CURRENT USE OF INSULIN: ICD-10-CM

## 2024-09-05 RX ORDER — TIRZEPATIDE 10 MG/.5ML
10 INJECTION, SOLUTION SUBCUTANEOUS
Qty: 6 ML | Refills: 1 | Status: SHIPPED | OUTPATIENT
Start: 2024-09-05 | End: 2025-03-04

## 2024-09-05 NOTE — TELEPHONE ENCOUNTER
No care due was identified.  [facility] Embedded Care Due Messages. Reference number: [id number].   9/04/2024 9:18:20 PM CDT

## 2024-09-05 NOTE — TELEPHONE ENCOUNTER
Refill Routing Note   Medication(s) are not appropriate for processing by [facility] for the following reason(s):        New or recently adjusted medication    [facility] action(s):  Defer        Medication Therapy Plan: New start (7/8/24)      Appointments  past 12m or future 3m with PCP    Date Provider   Last Visit   7/15/2024 Ebony Collins, DO   Next Visit   9/12/2024 Ebony Collins, DO   ED visits in past 90 days: 0        Note composed:8:16 AM 09/05/2024

## 2024-09-12 ENCOUNTER — PATIENT MESSAGE (OUTPATIENT)
Dept: PRIMARY CARE CLINIC | Facility: CLINIC | Age: 39
End: 2024-09-12
Payer: COMMERCIAL

## 2024-09-12 DIAGNOSIS — E11.65 TYPE 2 DIABETES MELLITUS WITH HYPERGLYCEMIA, WITHOUT LONG-TERM CURRENT USE OF INSULIN: Primary | ICD-10-CM

## 2024-10-25 ENCOUNTER — TELEPHONE (OUTPATIENT)
Dept: PRIMARY CARE CLINIC | Facility: CLINIC | Age: 39
End: 2024-10-25
Payer: COMMERCIAL

## 2025-01-30 ENCOUNTER — OFFICE VISIT (OUTPATIENT)
Dept: URGENT CARE | Facility: CLINIC | Age: 40
End: 2025-01-30
Payer: COMMERCIAL

## 2025-01-30 VITALS
RESPIRATION RATE: 20 BRPM | WEIGHT: 315 LBS | BODY MASS INDEX: 44.1 KG/M2 | DIASTOLIC BLOOD PRESSURE: 80 MMHG | TEMPERATURE: 99 F | HEIGHT: 71 IN | SYSTOLIC BLOOD PRESSURE: 130 MMHG | HEART RATE: 85 BPM | OXYGEN SATURATION: 97 %

## 2025-01-30 DIAGNOSIS — R49.0 HOARSENESS OF VOICE: ICD-10-CM

## 2025-01-30 DIAGNOSIS — R05.1 ACUTE COUGH: Primary | ICD-10-CM

## 2025-01-30 LAB
CTP QC/QA: YES
CTP QC/QA: YES
POC MOLECULAR INFLUENZA A AGN: NEGATIVE
POC MOLECULAR INFLUENZA B AGN: NEGATIVE
SARS-COV-2 AG RESP QL IA.RAPID: NEGATIVE

## 2025-01-30 PROCEDURE — 87502 INFLUENZA DNA AMP PROBE: CPT | Mod: QW,S$GLB,, | Performed by: NURSE PRACTITIONER

## 2025-01-30 PROCEDURE — 99213 OFFICE O/P EST LOW 20 MIN: CPT | Mod: S$GLB,,, | Performed by: NURSE PRACTITIONER

## 2025-01-30 PROCEDURE — 87811 SARS-COV-2 COVID19 W/OPTIC: CPT | Mod: QW,S$GLB,, | Performed by: NURSE PRACTITIONER

## 2025-01-30 NOTE — PATIENT INSTRUCTIONS
Recommendations:  Oral antihistamine (Claritin, Zyrtec, Allegra,or Xyzal) for post nasal drip and runny nose  Steroid nasal spray (Flonase) for runny nose and sinus congestion  Cough expectorant (Mucinex DM) to break up mucus in your chest. Take with a full glass of water    Please drink plenty of fluids.  Please get plenty of rest.  Nasal irrigation with a saline spray or Netti Pot like device per their directions is also recommended.  If you  smoke, please stop smoking.    To help ease a sore throat, you can:  Use a sore throat spray.  Suck on hard candy or throat lozenges.  Gargle with warm saltwater a few times each day. Mix of 1/4 teaspoon (1.25 grams) salt in 8 ounces (240 mL) of warm water.  Use a cool mist humidifier to help you breathe easier.        If not allergic, take Tylenol (Acetaminophen) 650 mg to  1 g every 6 hours as needed  and/or Motrin (Ibuprofen) 600 to 800 mg every 6 hours as needed for fever or pain.      Please remember that you have received care at an urgent care today. Urgent cares are not emergency rooms and are not equipped to handle life threatening emergencies and cannot rule in or out certain medical conditions and you may be released before all of your medical problems are known or treated.     Please arrange follow up with your primary care physician or speciality clinic within 2-5 days if your signs and symptoms have not resolved or worsen.     Patient can call our Referral Hotline at [phone] to make an appointment.      Please return here or go to the Emergency Department for any concerns or worsening of condition.  Signs of infection. These include a fever of 100.4°F (38°C) or higher, chills, cough, more sputum or change in color of sputum.  You are having so much trouble breathing that you can only say one or two words at a time.  You need to sit upright at all times to be able to breathe and or cannot lie down.  You have trouble breathing when talking or sitting  still.  You have a fever of 100.4°F (38°C) or higher or chills.  You have chest pain when you cough, have trouble breathing but can still talk in full sentences, or cough up blood.

## 2025-01-30 NOTE — PROGRESS NOTES
"Subjective:      Patient ID: Matthieu Son is a 39 y.o. male.    Vitals:  height is 5' 11" (1.803 m) and weight is 173 kg (381 lb 6.3 oz) (abnormal). His oral temperature is 98.9 °F (37.2 °C). His blood pressure is 130/80 and his pulse is 85. His respiration is 20 and oxygen saturation is 97%.     Chief Complaint: Sore Throat    This pt complains of sore throat x 1 day. S/S: runny nose. Pt states is throat is not sore.     Provider note begins here:  Patient is a 39-year-old male here with complaints hoarseness and cough x1 day.  His coworkers noticed he sounded funny at work and told him he should go home and be evaluated.  He works in the restaurant industry.  He denies sore throat or fever.  Does note a postnasal drip.  He has had strep throat in the past and this does not feel the same    Sore Throat   This is a new problem. The current episode started yesterday. Associated symptoms include coughing and a hoarse voice. Pertinent negatives include no abdominal pain, congestion, diarrhea, drooling, ear pain, headaches, neck pain, shortness of breath, stridor, swollen glands, trouble swallowing or vomiting. He has had no exposure to strep or mono.       HENT:  Positive for sore throat. Negative for ear pain, drooling, congestion and trouble swallowing.    Neck: Negative for neck pain.   Respiratory:  Positive for cough. Negative for shortness of breath and stridor.    Gastrointestinal:  Negative for abdominal pain, vomiting and diarrhea.   Neurological:  Negative for headaches.      Objective:     Physical Exam   Constitutional: He is oriented to person, place, and time. He appears well-developed. He is cooperative.  Non-toxic appearance. He does not appear ill. No distress.   HENT:   Head: Normocephalic and atraumatic.   Ears:   Right Ear: Hearing, external ear and ear canal normal. A middle ear effusion is present.   Left Ear: Hearing, external ear and ear canal normal. A middle ear effusion is present.   Nose: " Congestion present. No mucosal edema, rhinorrhea or nasal deformity. No epistaxis. Right sinus exhibits no maxillary sinus tenderness and no frontal sinus tenderness. Left sinus exhibits no maxillary sinus tenderness and no frontal sinus tenderness.   Mouth/Throat: Uvula is midline, oropharynx is clear and moist and mucous membranes are normal. No trismus in the jaw. Normal dentition. No uvula swelling. No oropharyngeal exudate, posterior oropharyngeal edema, posterior oropharyngeal erythema, tonsillar abscesses or cobblestoning.   Eyes: Conjunctivae and lids are normal. No scleral icterus.   Neck: Trachea normal and phonation normal. Neck supple. No edema present. No erythema present. No neck rigidity present.   Cardiovascular: Normal rate, regular rhythm, normal heart sounds and normal pulses.   Pulmonary/Chest: Effort normal and breath sounds normal. No stridor. No respiratory distress. He has no decreased breath sounds. He has no wheezes. He has no rhonchi.   Abdominal: Normal appearance.   Musculoskeletal: Normal range of motion.         General: No deformity. Normal range of motion.   Lymphadenopathy:     He has no cervical adenopathy.   Neurological: He is alert and oriented to person, place, and time. He exhibits normal muscle tone. Coordination normal.   Skin: Skin is warm, dry, intact, not diaphoretic and not pale.   Psychiatric: His speech is normal and behavior is normal. Judgment and thought content normal.   Nursing note and vitals reviewed.      Assessment:     1. Acute cough    2. Hoarseness of voice      Results for orders placed or performed in visit on 01/30/25   POCT Influenza A/B MOLECULAR    Collection Time: 01/30/25  1:57 PM   Result Value Ref Range    POC Molecular Influenza A Ag Negative Negative    POC Molecular Influenza B Ag Negative Negative     Acceptable Yes    SARS Coronavirus 2 Antigen, POCT Manual Read    Collection Time: 01/30/25  1:58 PM   Result Value Ref Range     SARS Coronavirus 2 Antigen Negative Negative     Acceptable Yes        Plan:       Acute cough  -     POCT Influenza A/B MOLECULAR  -     SARS Coronavirus 2 Antigen, POCT Manual Read    Hoarseness of voice        Patient Instructions   Recommendations:  Oral antihistamine (Claritin, Zyrtec, Allegra,or Xyzal) for post nasal drip and runny nose  Steroid nasal spray (Flonase) for runny nose and sinus congestion  Cough expectorant (Mucinex DM) to break up mucus in your chest. Take with a full glass of water    Please drink plenty of fluids.  Please get plenty of rest.  Nasal irrigation with a saline spray or Netti Pot like device per their directions is also recommended.  If you  smoke, please stop smoking.    To help ease a sore throat, you can:  Use a sore throat spray.  Suck on hard candy or throat lozenges.  Gargle with warm saltwater a few times each day. Mix of 1/4 teaspoon (1.25 grams) salt in 8 ounces (240 mL) of warm water.  Use a cool mist humidifier to help you breathe easier.        If not allergic, take Tylenol (Acetaminophen) 650 mg to  1 g every 6 hours as needed  and/or Motrin (Ibuprofen) 600 to 800 mg every 6 hours as needed for fever or pain.      Please remember that you have received care at an urgent care today. Urgent cares are not emergency rooms and are not equipped to handle life threatening emergencies and cannot rule in or out certain medical conditions and you may be released before all of your medical problems are known or treated.     Please arrange follow up with your primary care physician or speciality clinic within 2-5 days if your signs and symptoms have not resolved or worsen.     Patient can call our Referral Hotline at [phone] to make an appointment.      Please return here or go to the Emergency Department for any concerns or worsening of condition.  Signs of infection. These include a fever of 100.4°F (38°C) or higher, chills, cough, more sputum or change in  color of sputum.  You are having so much trouble breathing that you can only say one or two words at a time.  You need to sit upright at all times to be able to breathe and or cannot lie down.  You have trouble breathing when talking or sitting still.  You have a fever of 100.4°F (38°C) or higher or chills.  You have chest pain when you cough, have trouble breathing but can still talk in full sentences, or cough up blood.

## 2025-02-03 ENCOUNTER — TELEPHONE (OUTPATIENT)
Dept: PRIMARY CARE CLINIC | Facility: CLINIC | Age: 40
End: 2025-02-03
Payer: COMMERCIAL

## 2025-02-03 NOTE — TELEPHONE ENCOUNTER
Patient has been left message in regards to scheduling of labs. Patient informed via message MD Collins does complete labs a week prior to actual appointment. Patient also informed please provide [facility] location and labs can be faxed or printed out. Office contact provided to further discuss any questions or concerns.

## 2025-02-28 DIAGNOSIS — E11.65 TYPE 2 DIABETES MELLITUS WITH HYPERGLYCEMIA, WITHOUT LONG-TERM CURRENT USE OF INSULIN: ICD-10-CM

## 2025-02-28 RX ORDER — TIRZEPATIDE 10 MG/.5ML
10 INJECTION, SOLUTION SUBCUTANEOUS
Qty: 12 PEN | Refills: 1 | Status: SHIPPED | OUTPATIENT
Start: 2025-02-28

## 2025-03-01 NOTE — TELEPHONE ENCOUNTER
Care Due:                  Date            Visit Type   Department     Provider  --------------------------------------------------------------------------------                                EP -                              PRIMARY      [facility]  Last Visit: 07-      CARE ([facility])   TRENTON Collins                              EP -                              PRIMARY      [facility]  Next Visit: 05-      CARE ([facility])   TRENTON Collins                                                            Last  Test          Frequency    Reason                     Performed    Due Date  --------------------------------------------------------------------------------    HBA1C.......  6 months...  metFORMIN, tirzepatide...  09-   03-    [facility] Embedded Care Due Messages. Reference number: [id number].   2/28/2025 6:37:10 PM CST

## 2025-03-01 NOTE — TELEPHONE ENCOUNTER
Provider Staff:  Action required for this patient    Requires labs      Please see care gap opportunities below in Care Due Message.    Thanks!  [facility]     Appointments      Date Provider   Last Visit   7/15/2024 Ebony Collins, DO   Next Visit   5/12/2025 Ebony Collins, DO     Refill Decision Note   Matthieu Son  is requesting a refill authorization.  Brief Assessment and Rationale for Refill:  Approve     Medication Therapy Plan:  FOVS      Comments:     Note composed:7:03 PM 02/28/2025

## 2025-05-05 ENCOUNTER — RESULTS FOLLOW-UP (OUTPATIENT)
Dept: PRIMARY CARE CLINIC | Facility: CLINIC | Age: 40
End: 2025-05-05

## 2025-05-06 NOTE — PROGRESS NOTES
FAMILY MEDICINE  [facility]    Reason for visit:   Chief Complaint   Patient presents with    Annual Exam    Diabetes         SUBJECTIVE: Matthieu Son is a 40 y.o. male  - smoker (started 18 years old 1 pack per day. 10/2022 changed to vaping) with morbid obesity, venous insufficiency with history lower extremity venous stasis ulcer that have healed, hypertension, and type 2 diabetes presents annual and follow up hypertension and diabetes.     The patient's last visit with me was on 7/15/2024.    Matthieu Son reports that he is doing well.  He denies any concerns or complaints.    1. Diabetes Type 2     Age diagnosed: 33 yo    Current treatment regimen:   metFORMIN (GLUCOPHAGE-XR) 500 MG ER 24hr tablet, Take 2 tablets (1,000 mg total) by mouth 2 (two) times daily with meals., Disp: 360 tablet, Rfl: 3  tirzepatide 10 mg/0.5 mL PnIj, Inject 10 mg into the skin every 7 days., Disp: 2 mL, Rfl: 0    Prior medication:  metFORMIN (GLUCOPHAGE-XR) 500 MG ER 24hr tablet, Take 1 tablet (500 mg total) by mouth 2 (two) times daily with meals  - discontinue 9/2021 visit since A1c had improved to 5.5%.  He opted to stay on Trulicity since have been helpful for his weight loss  Trulicity ( availability)     Side effects from treatment: denies  Complications of diabetes:  hyperglycemia     Glucometer: yes  Glucose monitoring: not monitoring    Lab Results       Component                Value               Date                       HGBA1C                   5.7 (H)             05/02/2025                Lab Results       Component                Value               Date                       HGBA1C                   13.9 (H)            06/10/2024                Lab Results       Component                Value               Date                       HGBA1C                   5.9 (H)             03/30/2023               Lab Results       Component                Value               Date                        HGBA1C                   5.6                 09/30/2022                      Low-dose statin:  Pravastatin  Last eye exam: Referral to [facility]. Eye exam ordered 6/11/24 and copy requested  Last foot exam: 5/12/25     Wt Readings from Last 10 Encounters:  05/12/25 : (!) 166 kg (365 lb 15.4 oz)  01/30/25 : (!) 173 kg (381 lb 6.3 oz)  07/15/24 : (!) 173 kg (381 lb 6.3 oz)  06/11/24 : (!) 177.3 kg (390 lb 14 oz)  01/08/24 : (!) 194.6 kg (429 lb)  11/10/23 : (!) 194.6 kg (429 lb)  10/26/23 : (!) 194.7 kg (429 lb 5.5 oz)  04/04/23 : (!) 194.7 kg (429 lb 5.5 oz)  10/03/22 : (!) 188.5 kg (415 lb 9.6 oz)  08/13/22 : (!) 174.6 kg (385 lb)      2. Hypertension  - Comorbid issues: DM  - BP goal < 130/80    Today Matthieu Son reports doing well with medication    Current medication treatment:   losartan (COZAAR) 50 MG tablet, Take 1 tablet (50 mg total) by mouth once daily., Disp: 90 tablet, Rfl: 3  hydroCHLOROthiazide (HYDRODIURIL) 25 MG tablet, Take 1 tablet (25 mg total) by mouth once daily., Disp: 90 tablet, Rfl: 3    Medication side effects: denies    3. Hyperlipidemia  - diabetes, hypertension, and hyperlipidemia  - LDL goal < 100    Current treatment:  pravastatin (PRAVACHOL) 10 MG tablet, Take 1 tablet (10 mg total) by mouth once daily., Disp: 90 tablet, Rfl: 3    Side effects from current treatment: none    Lab Results       Component                Value               Date                       CHOL                     199                 06/10/2024                 CHOL                     199                 06/10/2024                 CHOL                     145                 03/30/2023             Lab Results       Component                Value               Date                       HDL                      38 (L)              06/10/2024                 HDL                      38 (L)              06/10/2024                 HDL                      45                  03/30/2023            Lab  "Results       Component                Value               Date                       LDLCALC                  134 (H)             06/10/2024                 LDLCALC                  134 (H)             06/10/2024                 LDLCALC                  78                  03/30/2023             Lab Results       Component                Value               Date                       TRIG                     138                 06/10/2024                 TRIG                     138                 06/10/2024                 TRIG                     121                 03/30/2023            No results found for: "TOTALCHOLEST"  Lab Results       Component                Value               Date                       NONHDLCHOL               161 (H)             06/10/2024                 NONHDLCHOL               161 (H)             06/10/2024                 NONHDLCHOL               100                 03/30/2023            Lab Results       Component                Value               Date                       CHOLHDL                  5.2 (H)             06/10/2024                 CHOLHDL                  5.2 (H)             06/10/2024                 CHOLHDL                  3.2                 03/30/2023                                  Review of Systems   All other systems reviewed and are negative.      HEALTH MAINTENANCE:   Health Maintenance   Topic Date Due    Diabetic Eye Exam  09/01/2022    COVID-19 Vaccine (2 - 2024-25 season) 09/01/2024    Diabetes Urine Screening  06/10/2025    Lipid Panel  06/10/2025    Foot Exam  06/11/2025    Influenza Vaccine (Season Ended) 09/01/2025    Hemoglobin A1c  11/02/2025    TETANUS VACCINE  08/18/2026    RSV Vaccine (Age 60+ and Pregnant patients) (1 - 1-dose 75+ series) 02/09/2061    Hepatitis C Screening  Completed    HIV Screening  Completed    Pneumococcal Vaccines (Age 0-49)  Completed     Health Maintenance Topics with due status: Not Due       Topic Last Completion " Date    TETANUS VACCINE 08/18/2016    Influenza Vaccine 10/03/2022    Hemoglobin A1c 05/02/2025    RSV Vaccine (Age 60+ and Pregnant patients) Not Due     Health Maintenance Due   Topic Date Due    Diabetic Eye Exam  09/01/2022    COVID-19 Vaccine (2 - 2024-25 season) 09/01/2024    Diabetes Urine Screening  06/10/2025    Lipid Panel  06/10/2025    Foot Exam  06/11/2025       HISTORY:   Past Medical History:   Diagnosis Date    Diabetes mellitus, type 2     Hypertension     Venous stasis ulcer of calf limited to breakdown of skin with varicose veins 6/28/2019    Venous ulcers of both lower extremities 5/29/2019       Past Surgical History:   Procedure Laterality Date    WISDOM TOOTH EXTRACTION         Family History   Problem Relation Name Age of Onset    Diabetes Mother Meghan Son     Hypertension Mother Meghan Son     Breast cancer Mother Meghan Son     Hyperlipidemia Mother Meghan Son     Cancer Mother Meghan Son         breast cancer    Diabetes Father Michael Son     Hypertension Father Michael Son     Hyperlipidemia Father Michael Son     Peripheral vascular disease Father Michael Son     Heart attack Father Michael Son     No Known Problems Sister      No Known Problems Son      Colon cancer Maternal Grandmother      Heart disease Maternal Grandfather Wiliam Carrera     No Known Problems Paternal Grandmother      No Known Problems Paternal Grandfather         Social History[1]    Social History     Social History Narrative     to Kendra. 1 son (2023 6 years old); [place].  and manager [employer] Occasional social alcohol use. Smoker since 17 yo about 1 ppd.  03/2021 reports that he decreased his smoking to half a pack a day.  Denies illicit drugs.       ALLERGIES:   Review of patient's allergies indicates:  No Known Allergies    MEDS:   Current Outpatient Medications on File Prior to Visit   Medication Sig Dispense Refill Last Dose/Taking    aspirin  "(ECOTRIN) 81 MG EC tablet Take 81 mg by mouth once daily.       blood sugar diagnostic Strp Use 1 strip to test blood sugar once daily 100 each 3     blood-glucose meter Misc USE DAILY AS DIRECTED 1 each 0     hydroCHLOROthiazide (HYDRODIURIL) 25 MG tablet Take 1 tablet (25 mg total) by mouth once daily. 90 tablet 3     lancets Misc Use 1 lancet to test blood sugar once daily 100 each 3     losartan (COZAAR) 50 MG tablet Take 1 tablet (50 mg total) by mouth once daily. 90 tablet 3     metFORMIN (GLUCOPHAGE-XR) 500 MG ER 24hr tablet Take 2 tablets (1,000 mg total) by mouth 2 (two) times daily with meals. 360 tablet 3     multivit-minerals/folic acid (ONE-A-DAY MEN VITACRAVES ORAL) Take 1 tablet by mouth once daily.       pravastatin (PRAVACHOL) 10 MG tablet Take 1 tablet (10 mg total) by mouth once daily. 90 tablet 3     [DISCONTINUED] tirzepatide (MOUNJARO) 10 mg/0.5 mL PnIj Inject 10 mg into the skin every 7 days. 12 Pen 1          Vital signs:   Vitals:    05/12/25 1400   BP: 138/76   BP Location: Left arm   Patient Position: Sitting   Pulse: 81   Resp: 18   Weight: (!) 166 kg (365 lb 15.4 oz)   Height: 5' 11" (1.803 m)     Body mass index is 51.04 kg/m².    PHYSICAL EXAM:     Physical Exam  Vitals reviewed.   Constitutional:       General: He is not in acute distress.     Appearance: Normal appearance.   HENT:      Head: Normocephalic and atraumatic.      Right Ear: Tympanic membrane and ear canal normal.      Left Ear: Tympanic membrane and ear canal normal.      Nose: Nose normal.      Mouth/Throat:      Pharynx: Uvula midline.   Eyes:      General: No scleral icterus.     Conjunctiva/sclera: Conjunctivae normal.   Neck:      Thyroid: No thyromegaly.      Vascular: Normal carotid pulses. No carotid bruit or JVD.      Trachea: Trachea normal.   Cardiovascular:      Rate and Rhythm: Normal rate and regular rhythm.      Pulses: Normal pulses.           Dorsalis pedis pulses are 2+ on the right side and 2+ on the " left side.        Posterior tibial pulses are 2+ on the right side and 2+ on the left side.      Heart sounds: Normal heart sounds. No murmur heard.     No friction rub. No gallop.   Pulmonary:      Effort: Pulmonary effort is normal.      Breath sounds: Normal breath sounds. No decreased breath sounds, wheezing, rhonchi or rales.   Abdominal:      General: Bowel sounds are normal.      Palpations: Abdomen is soft. There is no hepatomegaly or mass.      Tenderness: There is no abdominal tenderness. There is no guarding or rebound.   Musculoskeletal:      Cervical back: Neck supple.      Right lower leg: No edema.      Left lower leg: No edema.   Feet:      Right foot:      Protective Sensation: 5 sites tested.  5 sites sensed.      Skin integrity: Skin integrity normal.      Left foot:      Protective Sensation: 5 sites tested.  5 sites sensed.      Skin integrity: Skin integrity normal.   Lymphadenopathy:      Cervical: No cervical adenopathy.   Skin:     General: Skin is warm.      Capillary Refill: Capillary refill takes less than 2 seconds.      Nails: There is no clubbing.   Neurological:      Mental Status: He is alert and oriented to person, place, and time.             PERTINENT RESULTS:   CMP  Sodium   Date Value Ref Range Status   05/02/2025 142 135 - 146 mmol/L Final     Potassium   Date Value Ref Range Status   05/02/2025 4.1 3.5 - 5.3 mmol/L Final     Chloride   Date Value Ref Range Status   05/02/2025 104 98 - 110 mmol/L Final     CO2   Date Value Ref Range Status   05/02/2025 28 20 - 32 mmol/L Final     Glucose   Date Value Ref Range Status   05/02/2025 123 (H) 65 - 99 mg/dL Final     Comment:                   Fasting reference interval     For someone without known diabetes, a glucose value  between 100 and 125 mg/dL is consistent with  prediabetes and should be confirmed with a  follow-up test.          BUN   Date Value Ref Range Status   05/02/2025 12 7 - 25 mg/dL Final     Creatinine   Date Value  "Ref Range Status   05/02/2025 0.69 0.60 - 1.26 mg/dL Final     Calcium   Date Value Ref Range Status   05/02/2025 9.0 8.6 - 10.3 mg/dL Final     Total Protein   Date Value Ref Range Status   05/02/2025 6.6 6.1 - 8.1 g/dL Final     Albumin   Date Value Ref Range Status   05/02/2025 4.0 3.6 - 5.1 g/dL Final     Total Bilirubin   Date Value Ref Range Status   05/02/2025 0.5 0.2 - 1.2 mg/dL Final     Alkaline Phosphatase   Date Value Ref Range Status   06/13/2019 76 40 - 115 U/L Final     AST   Date Value Ref Range Status   05/02/2025 12 10 - 40 U/L Final     ALT   Date Value Ref Range Status   05/02/2025 17 9 - 46 U/L Final     eGFR   Date Value Ref Range Status   05/02/2025 121 > OR = 60 mL/min/1.73m2 Final     Lab Results   Component Value Date    HGBA1C 5.7 (H) 05/02/2025     Lab Results   Component Value Date    WBC 6.4 06/10/2024    HGB 14.1 06/10/2024    HCT 44.7 06/10/2024    MCV 92.7 06/10/2024     06/10/2024       Lab Results   Component Value Date    CHOL 199 06/10/2024    CHOL 199 06/10/2024    CHOL 145 03/30/2023      Lab Results   Component Value Date    HDL 38 (L) 06/10/2024    HDL 38 (L) 06/10/2024    HDL 45 03/30/2023     Lab Results   Component Value Date    LDLCALC 134 (H) 06/10/2024    LDLCALC 134 (H) 06/10/2024    LDLCALC 78 03/30/2023      Lab Results   Component Value Date    TRIG 138 06/10/2024    TRIG 138 06/10/2024    TRIG 121 03/30/2023     No results found for: "TOTALCHOLEST"  Lab Results   Component Value Date    NONHDLCHOL 161 (H) 06/10/2024    NONHDLCHOL 161 (H) 06/10/2024    NONHDLCHOL 100 03/30/2023     Lab Results   Component Value Date    CHOLHDL 5.2 (H) 06/10/2024    CHOLHDL 5.2 (H) 06/10/2024    CHOLHDL 3.2 03/30/2023       ASSESSMENT/PLAN:    1. Encounter for general adult medical examination with abnormal findings  Overview:  - preventative health counseling  - reviewed current recommendations for colon cancer screening. MGDM with colon cancer  - reviewed current " recommendations for shared decision making in prostate cancer screening. Average risk         Orders:  -     Hemoglobin A1C; Future; Expected date: 10/12/2025  -     Lipid Panel; Future; Expected date: 10/12/2025  -     Microalbumin/Creatinine Ratio, Urine; Future; Expected date: 10/12/2025  -     Comprehensive Metabolic Panel; Future; Expected date: 10/12/2025  -     CBC Auto Differential; Future; Expected date: 10/12/2025    2. Type 2 diabetes mellitus without complication, without long-term current use of insulin  Overview:  Lab Results   Component Value Date    HGBA1C 5.7 (H) 05/02/2025     - well controlled  - titrating Mounjaro for weight loss  - patient encouraged to notify me with any changes  - request copy of eye exam    Orders:  -     Hemoglobin A1C; Future; Expected date: 10/12/2025  -     Cancel: Ambulatory referral/consult to Optometry; Future; Expected date: 05/13/2025  -     Lipid Panel; Future; Expected date: 10/12/2025  -     Microalbumin/Creatinine Ratio, Urine; Future; Expected date: 10/12/2025  -     Comprehensive Metabolic Panel; Future; Expected date: 10/12/2025  -     Cancel: Ambulatory referral/consult to Optometry; Future; Expected date: 05/13/2025  -     tirzepatide (MOUNJARO) 12.5 mg/0.5 mL PnIj; Inject 12.5 mg into the skin every 7 days.  Dispense: 6 mL; Refill: 1  -     Ambulatory referral/consult to Optometry; Future; Expected date: 05/13/2025    3. Hypertension associated with type 2 diabetes mellitus  Overview:  - well controlled  - continue current management plan   - patient encouraged to notify me with any changes    Orders:  -     CBC Auto Differential; Future; Expected date: 10/12/2025    4. Class 3 severe obesity due to excess calories with serious comorbidity and body mass index (BMI) of 50.0 to 59.9 in adult  Overview:  - discussed recommendation for diet and cardiovascular exercise  - counseling on lifestyle modifications for risk factor reduction  - counseling on management  options  - starting weight 10/26/23 429 lbs  Wt Readings from Last 1 Encounters:   05/12/25 (!) 166 kg (365 lb 15.4 oz)     - lbs lost 64 lbs  - great job!  Increase Mounjaro to 12.5 mg weekly  - questions elicited and answered  - encouraged to patient to notify me of any questions or concerns      5. Vaping nicotine dependence, tobacco product  Overview:  - started 18 years old  - 1 pack per day  - 03/2021 reduce to half a pack a day   - 8/2022 quit smoking and vaping only  - recommend quit vaping            ORDERS:   Orders Placed This Encounter    Hemoglobin A1C    Lipid Panel    Microalbumin/Creatinine Ratio, Urine    Comprehensive Metabolic Panel    CBC Auto Differential    Ambulatory referral/consult to Optometry    tirzepatide (MOUNJARO) 12.5 mg/0.5 mL PnIj       Vaccines recommended: covid-19    Follow up in about 6 months (around 11/12/2025) for diabetes. or sooner with any concerns      This encounter was dictated and transcribed using DeepScribe and [employer], please excuse any typographical or grammatical errors.    Dr. Ebony Collins D.O.   Family Medicine         [1]   Social History  Tobacco Use    Smoking status: Every Day     Current packs/day: 0.50     Average packs/day: 0.5 packs/day for 22.3 years (11.1 ttl pk-yrs)     Types: Vaping with nicotine, Cigarettes     Start date: 2/9/2003     Passive exposure: Current    Smokeless tobacco: Never   Substance Use Topics    Alcohol use: Yes     Comment: social    Drug use: Never

## 2025-05-12 ENCOUNTER — OFFICE VISIT (OUTPATIENT)
Dept: PRIMARY CARE CLINIC | Facility: CLINIC | Age: 40
End: 2025-05-12
Attending: FAMILY MEDICINE
Payer: COMMERCIAL

## 2025-05-12 VITALS
WEIGHT: 315 LBS | SYSTOLIC BLOOD PRESSURE: 138 MMHG | HEIGHT: 71 IN | DIASTOLIC BLOOD PRESSURE: 76 MMHG | BODY MASS INDEX: 44.1 KG/M2 | HEART RATE: 81 BPM | RESPIRATION RATE: 18 BRPM

## 2025-05-12 DIAGNOSIS — F17.290 VAPING NICOTINE DEPENDENCE, TOBACCO PRODUCT: ICD-10-CM

## 2025-05-12 DIAGNOSIS — E11.9 TYPE 2 DIABETES MELLITUS WITHOUT COMPLICATION, WITHOUT LONG-TERM CURRENT USE OF INSULIN: ICD-10-CM

## 2025-05-12 DIAGNOSIS — E66.01 CLASS 3 SEVERE OBESITY DUE TO EXCESS CALORIES WITH SERIOUS COMORBIDITY AND BODY MASS INDEX (BMI) OF 50.0 TO 59.9 IN ADULT: ICD-10-CM

## 2025-05-12 DIAGNOSIS — E66.813 CLASS 3 SEVERE OBESITY DUE TO EXCESS CALORIES WITH SERIOUS COMORBIDITY AND BODY MASS INDEX (BMI) OF 50.0 TO 59.9 IN ADULT: ICD-10-CM

## 2025-05-12 DIAGNOSIS — I15.2 HYPERTENSION ASSOCIATED WITH TYPE 2 DIABETES MELLITUS: ICD-10-CM

## 2025-05-12 DIAGNOSIS — E11.59 HYPERTENSION ASSOCIATED WITH TYPE 2 DIABETES MELLITUS: ICD-10-CM

## 2025-05-12 DIAGNOSIS — Z00.01 ENCOUNTER FOR GENERAL ADULT MEDICAL EXAMINATION WITH ABNORMAL FINDINGS: Primary | ICD-10-CM

## 2025-05-12 PROBLEM — E11.65 TYPE 2 DIABETES MELLITUS WITH HYPERGLYCEMIA, WITHOUT LONG-TERM CURRENT USE OF INSULIN: Status: RESOLVED | Noted: 2024-06-11 | Resolved: 2025-05-12

## 2025-05-12 PROCEDURE — 99395 PREV VISIT EST AGE 18-39: CPT | Mod: S$GLB,,, | Performed by: FAMILY MEDICINE

## 2025-05-12 PROCEDURE — 99214 OFFICE O/P EST MOD 30 MIN: CPT | Mod: 25,S$GLB,, | Performed by: FAMILY MEDICINE

## 2025-05-12 PROCEDURE — 99999 PR PBB SHADOW E&M-EST. PATIENT-LVL IV: CPT | Mod: PBBFAC,,, | Performed by: FAMILY MEDICINE

## 2025-05-12 RX ORDER — TIRZEPATIDE 12.5 MG/.5ML
12.5 INJECTION, SOLUTION SUBCUTANEOUS
Qty: 6 ML | Refills: 1 | Status: SHIPPED | OUTPATIENT
Start: 2025-05-12 | End: 2025-11-08

## 2025-05-14 ENCOUNTER — PATIENT OUTREACH (OUTPATIENT)
Dept: ADMINISTRATIVE | Facility: HOSPITAL | Age: 40
End: 2025-05-14
Payer: COMMERCIAL

## 2025-09-03 DIAGNOSIS — E11.59 HYPERTENSION ASSOCIATED WITH TYPE 2 DIABETES MELLITUS: ICD-10-CM

## 2025-09-03 DIAGNOSIS — E11.65 TYPE 2 DIABETES MELLITUS WITH HYPERGLYCEMIA, WITHOUT LONG-TERM CURRENT USE OF INSULIN: ICD-10-CM

## 2025-09-03 DIAGNOSIS — I15.2 HYPERTENSION ASSOCIATED WITH TYPE 2 DIABETES MELLITUS: ICD-10-CM

## 2025-09-03 RX ORDER — HYDROCHLOROTHIAZIDE 25 MG/1
25 TABLET ORAL DAILY
Qty: 90 TABLET | Refills: 2 | Status: SHIPPED | OUTPATIENT
Start: 2025-09-03

## 2025-09-03 RX ORDER — LOSARTAN POTASSIUM 50 MG/1
50 TABLET ORAL DAILY
Qty: 90 TABLET | Refills: 2 | Status: SHIPPED | OUTPATIENT
Start: 2025-09-03

## 2025-09-03 RX ORDER — METFORMIN HYDROCHLORIDE 500 MG/1
1000 TABLET, EXTENDED RELEASE ORAL 2 TIMES DAILY WITH MEALS
Qty: 360 TABLET | Refills: 0 | Status: SHIPPED | OUTPATIENT
Start: 2025-09-03